# Patient Record
Sex: FEMALE | Race: WHITE
[De-identification: names, ages, dates, MRNs, and addresses within clinical notes are randomized per-mention and may not be internally consistent; named-entity substitution may affect disease eponyms.]

---

## 2018-09-25 ENCOUNTER — HOSPITAL ENCOUNTER (EMERGENCY)
Dept: HOSPITAL 97 - ER | Age: 73
Discharge: HOME | End: 2018-09-25
Payer: COMMERCIAL

## 2018-09-25 VITALS — TEMPERATURE: 97.2 F | OXYGEN SATURATION: 100 %

## 2018-09-25 VITALS — SYSTOLIC BLOOD PRESSURE: 154 MMHG | DIASTOLIC BLOOD PRESSURE: 62 MMHG

## 2018-09-25 DIAGNOSIS — Z88.2: ICD-10-CM

## 2018-09-25 DIAGNOSIS — I10: ICD-10-CM

## 2018-09-25 DIAGNOSIS — K64.9: ICD-10-CM

## 2018-09-25 DIAGNOSIS — Z95.1: ICD-10-CM

## 2018-09-25 DIAGNOSIS — K64.5: Primary | ICD-10-CM

## 2018-09-25 PROCEDURE — 99284 EMERGENCY DEPT VISIT MOD MDM: CPT

## 2018-09-25 NOTE — EDPHYS
Physician Documentation                                                                           

 [facility]                                                                

Name: Va Hughes                                                                              

Age: 73 yrs                                                                                       

Sex: Female                                                                                       

: 1945                                                                                   

MRN: [id number]                                                                                   

Arrival Date: 2018                                                                          

Time: 13:07                                                                                       

Account#: [id number]                                                                            

Bed 14                                                                                            

Private MD:                                                                                       

ED Physician Michael Martins                                                                       

HPI:                                                                                              

                                                                                             

13:32 This 73 yrs old  Female presents to ER via Ambulatory with complaints of       cp  

      Rectal Abscess.                                                                             

13:32 The patient presents to the emergency department with pain in the rectal area, that is  cp  

      moderate. Onset: The symptoms/episode began/occurred 2 day(s) ago.                          

13:32 Associate signs and symptoms: Pertinent negatives: abdominal pain, diarrhea, dysuria,   cp  

      fever, lower GI bleeding.                                                                   

                                                                                                  

Historical:                                                                                       

- Allergies:                                                                                      

13:24 Sulfa (Sulfonamide Antibiotics);                                                        hb  

- Home Meds:                                                                                      

13:24 Metoprolol Tartrate Oral [Active]; Omeprazole Oral [Active];                            hb  

- PMHx:                                                                                           

13:24 Hypertension;                                                                           hb  

- PSHx:                                                                                           

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  

                                                                                                  

- Immunization history:: Adult Immunizations up to date.                                          

- Social history:: Smoking status: Patient/guardian denies using tobacco.                         

- Ebola Screening: : No symptoms or risks identified at this time.                                

                                                                                                  

                                                                                                  

ROS:                                                                                              

13:35 Constitutional: Negative for body aches, chills, fever, poor PO intake.                 cp  

13:35 Eyes: Negative for injury, pain, redness, and discharge.                                cp  

13:35 Cardiovascular: Negative for chest pain.                                                    

13:35 Respiratory: Negative for cough, shortness of breath, wheezing.                             

13:35 Abdomen/GI: Positive for rectal pain, Negative for abdominal pain, vomiting, diarrhea,      

      black/tarry stool, rectal bleeding, bowel incontinence.                                     

13:35 : Negative for urinary symptoms.                                                          

13:35 All other systems are negative.                                                             

                                                                                                  

Exam:                                                                                             

13:42 Constitutional: The patient appears in no acute distress, alert, awake, well developed, cp  

      well nourished, uncomfortable.                                                              

13:42 Head/Face:  Normocephalic, atraumatic.                                                  cp  

13:42 Eyes: Periorbital structures: appear normal, Conjunctiva: normal, no exudate, no            

      injection, Sclera: no appreciated abnormality, Lids and lashes: appear normal,              

      bilaterally.                                                                                

13:42 ENT: External ear(s): are unremarkable, Nose: is normal, Mouth: Lips: moist, Oral           

      mucosa: moist, Posterior pharynx: is normal, airway is patent.                              

13:42 Chest/axilla: Inspection: normal.                                                           

13:42 Cardiovascular: Rate: bradycardic, Rhythm: regular.                                         

13:42 Respiratory: the patient does not display signs of respiratory distress,  Respirations:     

      normal, no use of accessory muscles, no retractions, no splinting, no tachypnea.            

13:42 Abdomen/GI: Inspection: abdomen appears normal, Palpation: abdomen is soft and              

      non-tender, in all quadrants, rebound tenderness, is not appreciated, voluntary             

      guarding, is not appreciated, involuntary guarding, is not appreciated.                     

13:42 Back: pain, is absent, ROM is normal.                                                       

13:42 : Rectal exam: hemorrhoid(s), external, painful, thrombosed.                              

13:42 Skin: abscess, not appreciated, cellulitis, is not appreciated, no rash present.            

                                                                                                  

Vital Signs:                                                                                      

13:23  / 60; Pulse 57; Resp 16; Temp 97.2; Pulse Ox 100% on R/A; Weight 63.05 kg;       hb  

      Height 5 ft. 2 in. (157.48 cm); Pain 10/10;                                                 

15:33  / 62; Pulse 60; Resp 18; Pulse Ox 100% on R/A;                                   hj  

13:23 Body Mass Index 25.42 (63.05 kg, 157.48 cm)                                             hb  

                                                                                                  

Procedures:                                                                                       

15:29 I \T\ D: Incision and drainage was performed for an abscess of the external hemorrhoid    cp

      Prepped with Betadine, Anesthetized with 3 ccs of 50/50 mixture 1% lidocaine with epi       

      and 0.5% marcaine. Incised with #11 blade. Drained multiple small clots.                    

                                                                                                  

MDM:                                                                                              

13:32 Patient medically screened.                                                             cp  

14:15 Differential diagnosis: hemorrhoids, fissure, abscess, pilonidal cyst.                  cp  

15:40 Data reviewed: vital signs, nurses notes, and as a result, I will discharge patient.    cp  

15:40 Counseling: I had a detailed discussion with the patient and/or guardian regarding: the cp  

      historical points, exam findings, and any diagnostic results supporting the                 

      discharge/admit diagnosis, to return to the emergency department if symptoms worsen or      

      persist or if there are any questions or concerns that arise at home. Response to           

      treatment: the patient's symptoms have markedly improved after treatment, and as a          

      result, I will discharge patient.                                                           

                                                                                                  

                                                                                             

14:06 Order name: I\T\D Setup; Complete Time: 15:11                                             cp

                                                                                                  

Administered Medications:                                                                         

15:11 Drug: Lidocaine-Epinephrine -1%: (1:100,000) 5 ml Volume: 20 ml; Route: Infiltration;   hj  

15:11 Drug: Marcaine (0.5 %) 5 ml Volume: 10 ml; Route: Infiltration;                         hj  

                                                                                                  

                                                                                                  

Disposition:                                                                                      

18 15:42 Discharged to Home. Impression: Hemorrhoids and perianal venous thrombosis.        

- Condition is Stable.                                                                            

- Discharge Instructions: Hemorrhoids, How to Take a Sitz Bath.                                   

- Prescriptions for Colace 100 mg Oral Tablet - take 1 tablet by ORAL route every 12              

  hours; 14 tablet. Tylenol- Codeine #3 300-30 mg Oral Tablet - take 2 tablets by ORAL            

  route every 6 hours As needed; 15 tablet.                                                       

- Medication Reconciliation Form, Thank You Letter, Antibiotic Education, Prescription            

  Opioid Use form.                                                                                

- Follow up: Hardik Paredes MD; When: 2 - 3 days; Reason: Recheck today's complaints.           

- Problem is new.                                                                                 

- Symptoms have improved.                                                                         

                                                                                                  

                                                                                                  

                                                                                                  

Addendum:                                                                                         

2018                                                                                        

     16:48 Co-signature as Attending Physician, Michael Martins MD.                                  g
s

                                                                                                  

Signatures:                                                                                       

Hardik Pedroza RN RN hj Page, Corey, PA PA   cp                                                   

Lizeth Romero RN RN hb Starr, Gregory, MD MD                                                      

                                                                                                  

Corrections: (The following items were deleted from the chart)                                    

                                                                                             

15:58 15:42 2018 15:42 Discharged to Home. Impression: Hemorrhoids and perianal venous  hj  

      thrombosis. Condition is Stable. Forms are Medication Reconciliation Form, Thank You        

      Letter, Antibiotic Education, Prescription Opioid Use. Follow up: Hardik Paredes; When:     

      2 - 3 days; Reason: Recheck today's complaints. Problem is new. Symptoms have improved.     

      cp                                                                                          

                                                                                                  

**************************************************************************************************

## 2018-09-25 NOTE — ER
Nurse's Notes                                                                                     

 [facility]                                                                

Name: Va Hughes                                                                              

Age: 73 yrs                                                                                       

Sex: Female                                                                                       

: 1945                                                                                   

MRN: [id number]                                                                                   

Arrival Date: 2018                                                                          

Time: 13:07                                                                                       

Account#: [id number]                                                                            

Bed 14                                                                                            

Private MD:                                                                                       

Diagnosis: Hemorrhoids and perianal venous thrombosis                                             

                                                                                                  

Presentation:                                                                                     

                                                                                             

13:22 Presenting complaint: Rectal abscess x 2 days. Transition of care: patient was not      hb  

      received from another setting of care. Onset of symptoms was 2018. Risk       

      Assessment: Do you want to hurt yourself or someone else? Patient reports no desire to      

      harm self or others. Care prior to arrival: None.                                           

13:22 Method Of Arrival: Ambulatory                                                           hb  

13:22 Acuity: EDMOND 4                                                                           hj  

13:33 Initial Sepsis Screen: Does the patient meet any 2 criteria? No. Patient's initial      hj  

      sepsis screen is negative. Does the patient have a suspected source of infection? No.       

      Patient's initial sepsis screen is negative.                                                

                                                                                                  

Triage Assessment:                                                                                

13:33 General: Appears in no apparent distress. uncomfortable, Behavior is calm, cooperative, hj  

      appropriate for age. Pain: Complains of pain in rectum.                                     

                                                                                                  

Historical:                                                                                       

- Allergies:                                                                                      

13:24 Sulfa (Sulfonamide Antibiotics);                                                        hb  

- Home Meds:                                                                                      

13:24 Metoprolol Tartrate Oral [Active]; Omeprazole Oral [Active];                            hb  

- PMHx:                                                                                           

13:24 Hypertension;                                                                           hb  

- PSHx:                                                                                           

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  

                                                                                                  

- Immunization history:: Adult Immunizations up to date.                                          

- Social history:: Smoking status: Patient/guardian denies using tobacco.                         

- Ebola Screening: : No symptoms or risks identified at this time.                                

                                                                                                  

                                                                                                  

Screenin:32 Abuse screen: Denies threats or abuse. Denies injuries from another. Nutritional        hj  

      screening: No deficits noted. Tuberculosis screening: No symptoms or risk factors           

      identified. Fall Risk None identified.                                                      

                                                                                                  

Assessment:                                                                                       

13:30 Reassessment: see triage for assessment;.                                               hj  

14:15 Reassessment: Patient and/or family updated on plan of care and expected duration. Pain hj  

      level reassessed. Patient is alert, oriented x 3, equal unlabored respirations, skin        

      warm/dry/pink. I\T\D kit in room;.                                                          

15:33 Reassessment: Patient and/or family updated on plan of care and expected duration. Pain hj  

      level reassessed. Patient is alert, oriented x 3, equal unlabored respirations, skin        

      warm/dry/pink. provider in room for I\T\D with ED tech Lissa;.                            

                                                                                                  

Vital Signs:                                                                                      

13:23  / 60; Pulse 57; Resp 16; Temp 97.2; Pulse Ox 100% on R/A; Weight 63.05 kg;       hb  

      Height 5 ft. 2 in. (157.48 cm); Pain 10/10;                                                 

15:33  / 62; Pulse 60; Resp 18; Pulse Ox 100% on R/A;                                   hj  

13:23 Body Mass Index 25.42 (63.05 kg, 157.48 cm)                                             hb  

                                                                                                  

ED Course:                                                                                        

13:07 Patient arrived in ED.                                                                  [id number] 

13:23 Triage completed.                                                                       hb  

13:23 Arm band placed on right wrist.                                                         hb  

13:25 Quan Marino PA is PHCP.                                                                cp  

13:25 Michael Martins MD is Attending Physician.                                              cp  

13:30 Served as a chaperone during rectal exam.                                               [facility] 

13:32 Hardik Pedroza RN is Primary Nurse.                                                    hj  

13:33 Patient has correct armband on for positive identification. Bed in low position. Call   hj  

      light in reach. Side rails up X 1.                                                          

14:07 Served as a chaperone during rectal exam.                                               [facility] 

15:30 Assist provider with I \T\ D: Set up I\T\D tray. Patient tolerated well. CLEANED WOUND      
5

      PATIENT TOLERATED IT WELL. Wound care: Patient tolerated well.                              

15:37 Hardik Paredes MD is Referral Physician.                                                

15:58 Patient did not have IV access during this emergency room visit.                        hj  

                                                                                                  

Administered Medications:                                                                         

15:11 Drug: Lidocaine-Epinephrine -1%: (1:100,000) 5 ml Volume: 20 ml; Route: Infiltration;   hj  

15:11 Drug: Marcaine (0.5 %) 5 ml Volume: 10 ml; Route: Infiltration;                           

                                                                                                  

                                                                                                  

Outcome:                                                                                          

15:42 Discharge ordered by MD.                                                                cp  

15:57 Discharged to home ambulatory.                                                            

15:57 Condition: stable                                                                           

15:57 Discharge instructions given to patient, Instructed on discharge instructions, follow       

      up and referral plans. medication usage, wound care, Demonstrated understanding of          

      instructions, follow-up care, medications, wound care, Prescriptions given X 2.             

15:58 Patient left the ED.                                                                    hj  

                                                                                                  

Signatures:                                                                                       

Hardik Pedroza RN RN hj Page, Corey, PA PA cp Baxter, Lizeth, GRACY                     RN   Martha Whiting                                 4                                                  

Erin Paredes                              5                                                  

                                                                                                  

Corrections: (The following items were deleted from the chart)                                    

13:33 13:22 Acuity: EDMOND 3 hb                                                                  hj  

                                                                                                  

**************************************************************************************************

## 2019-07-21 ENCOUNTER — HOSPITAL ENCOUNTER (EMERGENCY)
Dept: HOSPITAL 97 - ER | Age: 74
Discharge: HOME | End: 2019-07-21
Payer: COMMERCIAL

## 2019-07-21 VITALS — SYSTOLIC BLOOD PRESSURE: 144 MMHG | OXYGEN SATURATION: 99 % | DIASTOLIC BLOOD PRESSURE: 79 MMHG

## 2019-07-21 VITALS — TEMPERATURE: 98.4 F

## 2019-07-21 DIAGNOSIS — I10: ICD-10-CM

## 2019-07-21 DIAGNOSIS — Z88.2: ICD-10-CM

## 2019-07-21 DIAGNOSIS — S61.210A: Primary | ICD-10-CM

## 2019-07-21 DIAGNOSIS — E11.9: ICD-10-CM

## 2019-07-21 PROCEDURE — 99284 EMERGENCY DEPT VISIT MOD MDM: CPT

## 2019-07-21 PROCEDURE — 0JQJ0ZZ REPAIR RIGHT HAND SUBCUTANEOUS TISSUE AND FASCIA, OPEN APPROACH: ICD-10-PCS

## 2019-07-21 NOTE — RAD REPORT
EXAM DESCRIPTION:  RAD - Hand Right 3 View - 7/21/2019 3:08 pm

 

CLINICAL HISTORY:  PAIN

 

COMPARISON:  <Comparisons>

 

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 Sof
t tissue swelling is seen affecting the second finger. Small laceration is present in the region. No 
fracture or foreign body appreciated.

## 2019-07-21 NOTE — EDPHYS
Physician Documentation                                                                           

 [facility]                                                                 

Name: Va Hughes                                                                              

Age: 73 yrs                                                                                       

Sex: Female                                                                                       

: 1945                                                                                   

MRN: [id number]                                                                                   

Arrival Date: 2019                                                                          

Time: 14:37                                                                                       

Account#: [id number]                                                                            

Bed 25                                                                                            

Private MD:                                                                                       

ED Physician Michael Martins                                                                       

HPI:                                                                                              

                                                                                             

15:31 This 73 yrs old  Female presents to ER via Ambulatory with complaints of       kb  

      Finger Injury.                                                                              

15:33 The patient has a laceration related to: closed finger in door. The laceration(s)       kb  

      is(are) located on the palmar aspect of distal phalanx of right index finger. Onset:        

      The symptoms/episode began/occurred 1 hour(s) ago. Associated signs and symptoms: The       

      patient has no apparent associated signs or symptoms. The patient has not experienced       

      similar symptoms in the past. The patient has not recently seen a physician.                

                                                                                                  

Historical:                                                                                       

- Allergies:                                                                                      

14:46 Sulfa (Sulfonamide Antibiotics);                                                        la1 

- PMHx:                                                                                           

14:46 Hypertension; Diabetes - NIDDM;                                                         la1 

                                                                                                  

- Immunization history:: Adult Immunizations up to date.                                          

- Social history:: Smoking status: Patient/guardian denies using tobacco.                         

- Ebola Screening: : No symptoms or risks identified at this time.                                

                                                                                                  

                                                                                                  

ROS:                                                                                              

15:29 Constitutional: Negative for fever, chills, and weight loss, Cardiovascular: Negative   kb  

      for chest pain, palpitations, and edema, Respiratory: Negative for shortness of breath,     

      cough, wheezing, and pleuritic chest pain, Abdomen/GI: Negative for abdominal pain,         

      nausea, vomiting, diarrhea, and constipation, MS/Extremity: Negative for injury and         

      deformity, Neuro: Negative for headache, weakness, numbness, tingling, and seizure.         

15:29 Skin: Positive for laceration(s), of the palmar aspect of distal phalanx of right index     

      finger.                                                                                     

                                                                                                  

Exam:                                                                                             

15:29 Constitutional:  This is a well developed, well nourished patient who is awake, alert,  kb  

      and in no acute distress. Head/Face:  Normocephalic, atraumatic. Chest/axilla:  Normal      

      chest wall appearance and motion.  Nontender with no deformity.  No lesions are             

      appreciated. Cardiovascular:  Regular rate and rhythm with a normal S1 and S2.  No          

      gallops, murmurs, or rubs.  Normal PMI, no JVD.  No pulse deficits. Respiratory:  Lungs     

      have equal breath sounds bilaterally, clear to auscultation and percussion.  No rales,      

      rhonchi or wheezes noted.  No increased work of breathing, no retractions or nasal          

      flaring. Abdomen/GI:  Soft, non-tender, with normal bowel sounds.  No distension or         

      tympany.  No guarding or rebound.  No evidence of tenderness throughout. MS/ Extremity:     

       Pulses equal, no cyanosis.  Neurovascular intact.  Full, normal range of motion.           

      Neuro:  Awake and alert, GCS 15, oriented to person, place, time, and situation.            

      Cranial nerves II-XII grossly intact.  Motor strength 5/5 in all extremities.  Sensory      

      grossly intact.  Cerebellar exam normal.  Normal gait.                                      

15:29 Skin: injury, laceration(s), the wound is approximately  2 cm(s), of the palmar aspect      

      of distal phalanx of right index finger, that can be described as clean, no foreign         

      body, linear, without bleeding.                                                             

                                                                                                  

Vital Signs:                                                                                      

14:47  / 89; Pulse 55; Resp 16; Temp 98.4; Pulse Ox 98% on R/A; Weight 60.78 kg; Height la1 

      5 ft. 1 in. (154.94 cm);                                                                    

15:46  / 79; Pulse 61; Resp 17; Pulse Ox 99% on R/A;                                    ca1 

14:47 Body Mass Index 25.32 (60.78 kg, 154.94 cm)                                             la1 

                                                                                                  

Laceration:                                                                                       

15:29 Wound Repair of 2cm ( 0.8in ) subcutaneous laceration to palmar aspect of distal        kb  

      phalanx of right index finger. Linear shaped.. Distal neuro/vascular/tendon intact.         

      Anesthesia: Wound infiltrated with 1 mls of 1% lidocaine. Wound prep: Extensive             

      cleansing with hibiclenz by technician, Wound irrigation with saline by technician.         

      Skin closed with 3 4-0 Prolene using interrupted sutures and sterile technique. Dressed     

      with Neosporin. Patient tolerated well.                                                     

                                                                                                  

MDM:                                                                                              

14:49 Patient medically screened.                                                             kb  

15:29 Data reviewed: vital signs, nurses notes. Data interpreted: Pulse oximetry: on room air kb  

      is 98 %. Interpretation: normal. Counseling: I had a detailed discussion with the           

      patient and/or guardian regarding: the historical points, exam findings, and any            

      diagnostic results supporting the discharge/admit diagnosis, radiology results, the         

      need for outpatient follow up, a family practitioner, to return to the emergency            

      department if symptoms worsen or persist or if there are any questions or concerns that     

      arise at home.                                                                              

                                                                                                  

                                                                                             

14:52 Order name: Hand Right 3 View XRAY; Complete Time: 15:31                                kb  

                                                                                             

14:52 Order name: Wound Care: clean wound; Complete Time: 14:56                               kb  

                                                                                                  

Administered Medications:                                                                         

No medications were administered                                                                  

                                                                                                  

                                                                                                  

Disposition:                                                                                      

19 15:34 Discharged to Home. Impression: Laceration without foreign body of right index     

  finger without damage to nail.                                                                  

- Condition is Stable.                                                                            

- Discharge Instructions: Laceration Care, Adult, Easy-to-Read.                                   

                                                                                                  

- Medication Reconciliation Form, Thank You Letter, Antibiotic Education, Prescription            

  Opioid Use form.                                                                                

- Follow up: Emergency Department; When: As needed; Reason: Worsening of condition.               

  Follow up: Private Physician; When: 2 - 3 days; Reason: Recheck today's complaints,             

  Continuance of care, Re-evaluation by your physician.                                           

- Notes: Have sutures removed in 10 days Keep clean and dry                                       

                                                                                                  

                                                                                                  

Signatures:                                                                                       

Dispatcher [employer]                           EDMS                                                 

Maia Suarez, FNP-C                 FNP-Ckb                                                   

Martinez Dial RN                         RN   la1                                                  

Roselia Kuhn RN                        RN   ca1                                                  

                                                                                                  

Corrections: (The following items were deleted from the chart)                                    

15:49 15:34 2019 15:34 Discharged to Home. Impression: Laceration without foreign body  ca1 

      of right index finger without damage to nail. Condition is Stable. Forms are Medication     

      Reconciliation Form, Thank You Letter, Antibiotic Education, Prescription Opioid Use.       

      Follow up: Emergency Department; When: As needed; Reason: Worsening of condition.           

      Follow up: Private Physician; When: 2 - 3 days; Reason: Recheck today's complaints,         

      Continuance of care, Re-evaluation by your physician. kb                                    

                                                                                                  

**************************************************************************************************

## 2019-07-21 NOTE — ER
Nurse's Notes                                                                                     

 [facility]                                                                 

Name: Va Hughes                                                                              

Age: 73 yrs                                                                                       

Sex: Female                                                                                       

: 1945                                                                                   

MRN: [id number]                                                                                   

Arrival Date: 2019                                                                          

Time: 14:37                                                                                       

Account#: [id number]                                                                            

Bed 25                                                                                            

Private MD:                                                                                       

Diagnosis: Laceration without foreign body of right index finger without damage to nail           

                                                                                                  

Presentation:                                                                                     

                                                                                             

14:48 Presenting complaint: Patient states: I got my right index finger in the car door.      la1 

      Small laceration noted to finger. Transition of care: patient was not received from         

      another setting of care. Onset of symptoms was 2019. Risk Assessment: Do you       

      want to hurt yourself or someone else? Patient reports no desire to harm self or            

      others. Initial Sepsis Screen: Does the patient meet any 2 criteria? No. Patient's          

      initial sepsis screen is negative. Does the patient have a suspected source of              

      infection? No. Patient's initial sepsis screen is negative. Care prior to arrival: None.    

14:48 Method Of Arrival: Ambulatory                                                           la1 

14:48 Acuity: EDMOND 4                                                                           la1 

                                                                                                  

Triage Assessment:                                                                                

15:00 General: Appears in no apparent distress. comfortable, Behavior is calm, cooperative,   ca1 

      appropriate for age. Injury Description: Abrasion sustained to palmar aspect of middle      

      phalanx of right index finger is dirty, was sustained less than 30 minutes ago.             

                                                                                                  

Historical:                                                                                       

- Allergies:                                                                                      

14:46 Sulfa (Sulfonamide Antibiotics);                                                        la1 

- PMHx:                                                                                           

14:46 Hypertension; Diabetes - NIDDM;                                                         la1 

                                                                                                  

- Immunization history:: Adult Immunizations up to date.                                          

- Social history:: Smoking status: Patient/guardian denies using tobacco.                         

- Ebola Screening: : No symptoms or risks identified at this time.                                

                                                                                                  

                                                                                                  

Screenin:57 Abuse screen: Denies threats or abuse. Denies injuries from another. Nutritional        ca1 

      screening: No deficits noted. Tuberculosis screening: No symptoms or risk factors           

      identified. Fall Risk None identified.                                                      

                                                                                                  

Assessment:                                                                                       

14:57 General: Appears in no apparent distress. comfortable, Behavior is calm, cooperative,   ca1 

      appropriate for age. Pain: Complains of pain in right hand Pain does not radiate. Pain      

      currently is 7 out of 10 on a pain scale. Quality of pain is described as throbbing,        

      Pain began 30 min ago. Neuro: Level of Consciousness is awake, alert, obeys commands,       

      Oriented to person, place, time, situation. Cardiovascular: Heart tones S1 S2 present       

      Capillary refill < 3 seconds Patient's skin is warm and dry. Respiratory: Airway is         

      patent Respiratory effort is even, unlabored, Respiratory pattern is regular,               

      symmetrical, Breath sounds are clear bilaterally. Derm: Skin is healthy with good           

      turgor, Skin is pink, warm \T\ dry. Musculoskeletal: Circulation, motion, and sensation     

      intact. Capillary refill < 3 seconds, Range of motion: intact in all extremities.           

15:46 Reassessment: Patient appears in no apparent distress at this time. Patient is alert,   ca1 

      oriented x 3, equal unlabored respirations, skin warm/dry/pink.                             

                                                                                                  

Vital Signs:                                                                                      

14:47  / 89; Pulse 55; Resp 16; Temp 98.4; Pulse Ox 98% on R/A; Weight 60.78 kg; Height la1 

      5 ft. 1 in. (154.94 cm);                                                                    

15:46  / 79; Pulse 61; Resp 17; Pulse Ox 99% on R/A;                                    ca1 

14:47 Body Mass Index 25.32 (60.78 kg, 154.94 cm)                                             la1 

                                                                                                  

ED Course:                                                                                        

14:37 Patient arrived in ED.                                                                  mr  

14:47 Arm band placed on right wrist.                                                         la1 

14:48 Triage completed.                                                                       la1 

14:49 Maia Suarez FNP-C is [facility].                                                        kb  

14:49 Michael Martins MD is Attending Physician.                                              kb  

14:51 Roselia Kuhn RN is Primary Nurse.                                                      ca1 

14:57 Patient has correct armband on for positive identification. Bed in low position. Call   ca1 

      light in reach. Side rails up X 1. Pulse ox on. NIBP on.                                    

14:57 Patient did not have IV access during this emergency room visit.                        ca1 

15:03 Wound care: to laceration located on palmar aspect of distal phalanx of right index     jp3 

      finger was cleaned with Hibiclens, debrided using Betadine scrub, irrigated with normal     

      saline, Patient tolerated well.                                                             

15:08 Hand Right 3 View XRAY In Process Unspecified.                                          EDMS

15:30 Assist provider with laceration repair on palmar aspect of distal phalanx of right      ca1 

      index finger that was 2.5 cm. or less using sutures. Set up tray. Performed by Maia ONOFRE Dressed with 4X4s, Patient tolerated well.                                    

15:40 Aluminum finger splint applied to palmar aspect of distal phalanx of right index finger jp3 

      and palmar aspect of middle phalanx of right index finger. Patient maintains SpO2           

      saturation greater than 95% on room air.                                                    

                                                                                                  

Administered Medications:                                                                         

No medications were administered                                                                  

                                                                                                  

                                                                                                  

Outcome:                                                                                          

15:34 Discharge ordered by MD.                                                                aung  

15:49 Discharged to home ambulatory, with family.                                             ca1 

15:49 Condition: stable                                                                           

15:49 Discharge instructions given to patient, Instructed on discharge instructions, follow       

      up and referral plans. wound care, Demonstrated understanding of instructions,              

      follow-up care, wound care.                                                                 

15:49 Patient left the ED.                                                                    ca1 

                                                                                                  

Signatures:                                                                                       

Dispatcher [employer]                           EDMS                                                 

Maia Suarez, FNP-C                 FNP-Helga Butts Lee, RN                         RN   la1                                                  

Dorian Auguste                              jp3                                                  

Roselia Kuhn RN                        RN   ca1                                                  

                                                                                                  

**************************************************************************************************

## 2020-08-30 ENCOUNTER — HOSPITAL ENCOUNTER (EMERGENCY)
Dept: HOSPITAL 97 - ER | Age: 75
LOS: 1 days | Discharge: HOME | End: 2020-08-31
Payer: SELF-PAY

## 2020-08-30 DIAGNOSIS — I10: ICD-10-CM

## 2020-08-30 DIAGNOSIS — N88.8: ICD-10-CM

## 2020-08-30 DIAGNOSIS — Z88.2: ICD-10-CM

## 2020-08-30 DIAGNOSIS — N39.0: Primary | ICD-10-CM

## 2020-08-30 LAB
ALBUMIN SERPL BCP-MCNC: 3.1 G/DL (ref 3.4–5)
ALP SERPL-CCNC: 48 U/L (ref 45–117)
ALT SERPL W P-5'-P-CCNC: 16 U/L (ref 12–78)
AST SERPL W P-5'-P-CCNC: 23 U/L (ref 15–37)
BUN BLD-MCNC: 13 MG/DL (ref 7–18)
GLUCOSE SERPLBLD-MCNC: 159 MG/DL (ref 74–106)
HCT VFR BLD CALC: 32.3 % (ref 36–45)
LIPASE SERPL-CCNC: 83 U/L (ref 73–393)
LYMPHOCYTES # SPEC AUTO: 2 K/UL (ref 0.7–4.9)
MAGNESIUM SERPL-MCNC: 2.3 MG/DL (ref 1.8–2.4)
PMV BLD: 8.5 FL (ref 7.6–11.3)
POTASSIUM SERPL-SCNC: 3.3 MMOL/L (ref 3.5–5.1)
RBC # BLD: 3.66 M/UL (ref 3.86–4.86)

## 2020-08-30 PROCEDURE — 74177 CT ABD & PELVIS W/CONTRAST: CPT

## 2020-08-30 PROCEDURE — 84484 ASSAY OF TROPONIN QUANT: CPT

## 2020-08-30 PROCEDURE — 36415 COLL VENOUS BLD VENIPUNCTURE: CPT

## 2020-08-30 PROCEDURE — 93005 ELECTROCARDIOGRAM TRACING: CPT

## 2020-08-30 PROCEDURE — 96361 HYDRATE IV INFUSION ADD-ON: CPT

## 2020-08-30 PROCEDURE — 96374 THER/PROPH/DIAG INJ IV PUSH: CPT

## 2020-08-30 PROCEDURE — 80048 BASIC METABOLIC PNL TOTAL CA: CPT

## 2020-08-30 PROCEDURE — 99284 EMERGENCY DEPT VISIT MOD MDM: CPT

## 2020-08-30 PROCEDURE — 83690 ASSAY OF LIPASE: CPT

## 2020-08-30 PROCEDURE — 80076 HEPATIC FUNCTION PANEL: CPT

## 2020-08-30 PROCEDURE — 83735 ASSAY OF MAGNESIUM: CPT

## 2020-08-30 PROCEDURE — 81003 URINALYSIS AUTO W/O SCOPE: CPT

## 2020-08-30 PROCEDURE — 85025 COMPLETE CBC W/AUTO DIFF WBC: CPT

## 2020-08-31 NOTE — ER
Nurse's Notes                                                                                     

 [facility]                                                                 

Name: Va Hughes                                                                              

Age: 75 yrs                                                                                       

Sex: Female                                                                                       

: 1945                                                                                   

MRN: [id number]                                                                                   

Arrival Date: 2020                                                                          

Time: 17:49                                                                                       

Account#: [id number]                                                                            

Bed 6                                                                                             

Private MD:                                                                                       

Diagnosis: Abdominal Pain;Cervical Mass;Urinary tract infection, site not specified               

                                                                                                  

Presentation:                                                                                     

                                                                                             

17:54 Chief complaint: Patient states: has lost about 50 pounds in the last 2-3 months, she   sv  

      thinks its d/t stress. Burning w/ urination, painful urination x several months. "I         

      also have 5 growths in my thyroid. And I have lost more hair. I'm also anemic. This         

      heat has also been getting to me.". Risk Assessment: Do you want to hurt yourself or        

      someone else? Patient reports no desire to harm self or others. Onset of symptoms was       

      .                                                                                       

17:54 Method Of Arrival: Ambulatory                                                           sv  

17:54 Acuity: EDMOND 3                                                                           sv  

17:58 Coronavirus screen: Client denies travel out of the [place] in the last 14 days. At this   sv  

      time, the client does not indicate any symptoms associated with coronavirus-19. Ebola       

      Screen: No symptoms or risks identified at this time. Initial Sepsis Screen: Does the       

      patient meet any 2 criteria? No. Patient's initial sepsis screen is negative. Does the      

      patient have a suspected source of infection? No. Patient's initial sepsis screen is        

      negative.                                                                                   

                                                                                                  

Historical:                                                                                       

- Allergies:                                                                                      

17:57 Sulfa (Sulfonamide Antibiotics);                                                        sv  

- PMHx:                                                                                           

17:57 Diabetes - NIDDM; Hypertension;                                                         sv  

                                                                                                  

- Immunization history:: Adult Immunizations up to date.                                          

- Social history:: Smoking status: Patient denies any tobacco usage or history of.                

                                                                                                  

                                                                                                  

Screenin:45 Abuse screen: Denies threats or abuse. Nutritional screening: No deficits noted.        mt2 

      Tuberculosis screening: No symptoms or risk factors identified. Fall Risk None              

      identified.                                                                                 

                                                                                                  

Assessment:                                                                                       

19:44 Reassessment: Patient and/or family updated on plan of care and expected duration. Pain mt2 

      level reassessed. Patient is alert, oriented x 3, equal unlabored respirations, skin        

      warm/dry/pink. General: Appears in no apparent distress. Behavior is cooperative. Pain:     

      Denies pain.                                                                                

19:45 Neuro: No deficits noted. Cardiovascular: No deficits noted. Respiratory: No deficits   mt2 

      noted. GI: Reports lower abdominal pain, nausea. : No deficits noted. EENT: No            

      deficits noted. Derm: No deficits noted. Musculoskeletal: No deficits noted.                

20:30 Reassessment: Patient and/or family updated on plan of care and expected duration. Pain mt2 

      level reassessed. Patient is alert, oriented x 3, equal unlabored respirations, skin        

      warm/dry/pink. General: Appears uncomfortable, Behavior is agitated. Pain: Denies pain.     

22:00 Reassessment: Patient and/or family updated on plan of care and expected duration. Pain mt2 

      level reassessed. Patient is alert, oriented x 3, equal unlabored respirations, skin        

      warm/dry/pink. General: Appears distressed, uncomfortable, Behavior is anxious. Pain:       

      Complains of pain in chest Pain currently is 10 out of 10 on a pain scale.                  

      Cardiovascular: Reports chest pain.                                                         

22:00 Reassessment: PATIENT RETURN FROM CT AND C/O CP. MD NOTIFIED.                           mt2 

23:08 Reassessment: Patient and/or family updated on plan of care and expected duration. Pain mt2 

      level reassessed. Patient is alert, oriented x 3, equal unlabored respirations, skin        

      warm/dry/pink. Patient denies pain at this time. General: Appears comfortable, Behavior     

      is cooperative. Pain: Denies pain.                                                          

                                                                                                  

Vital Signs:                                                                                      

17:58  / 61; Pulse 89; Resp 16; Temp 98.5(O); Pulse Ox 98% ; Weight 51.26 kg; Height 5  sv  

      ft. 2 in. (157.48 cm);                                                                      

20:00  / 79; Pulse 77; Resp 16; Pulse Ox 95% ; Pain 5/10;                               mt2 

21:00  / 79; Pulse 81; Resp 16; Pulse Ox 95% ; Pain 0/10;                               mt2 

22:00  / 110; Pulse 80; Resp 22; Pulse Ox 96% ; Pain 10/10;                             mt2 

23:09  / 81; Pulse 71; Resp 16; Pulse Ox 96% ; Pain 0/10;                               mt2 

23:20  / 56; Pulse 73; Resp 16; Pulse Ox 96% ; Pain 0/10;                               mt2 

17:58 Body Mass Index 20.67 (51.26 kg, 157.48 cm)                                               

                                                                                                  

ED Course:                                                                                        

17:49 Patient arrived in ED.                                                                  mr  

17:54 Arm band placed on.                                                                       

17:57 Triage completed.                                                                         

18:17 Ele Garcia, RN is Primary Nurse.                                                     ll1 

19:04 Allan Johnson MD is Attending Physician.                                             [facility] 

19:35 Inserted saline lock: 22 gauge in right forearm, using aseptic technique. Blood         ds4 

      collected.                                                                                  

19:45 Patient has correct armband on for positive identification. Bed in low position. Call   mt2 

      light in reach. Side rails up X 1.                                                          

22:05 CT Abd/Pelvis - IV Contrast Only In Process Unspecified.                                EDMS

                                                                                             

00:02 Shayy Vizcaino MD is Referral Physician.                                            [facility] 

                                                                                                  

Administered Medications:                                                                         

                                                                                             

19:44 Drug: NS 0.9% 1000 ml Route: IV; Rate: 1000 ml; Site: right forearm;                    mt2 

20:30 Follow up: Response: No adverse reaction; IV Status: Completed infusion                 mt2 

19:44 Drug: Pepcid 20 mg Route: IVP; Site: right forearm;                                     mt2 

20:30 Follow up: Response: No adverse reaction; Pain is decreased                             mt2 

                                                                                                  

                                                                                                  

Outcome:                                                                                          

                                                                                             

00:03 Discharge ordered by MD.                                                                [facility] 

00:45 Patient left the ED.                                                                    mt2 

                                                                                                  

Signatures:                                                                                       

Dispatcher [employer]                           EDMS                                                 

Lynn Condon RN                    RN   Helga Mckeon Donovan                             ds4                                                  

Ele Garcia, GRACY                       RN   ll1                                                  

Allan Johnson MD MD   7                                                  

Emani Vance RN                    RN   mt2                                                  

                                                                                                  

Corrections: (The following items were deleted from the chart)                                    

                                                                                             

18:00 17:54 Chief complaint: Patient states: has lost about 50 pounds in the last 2-3 months, sv  

      she thinks its d/t stress. Burning w/ urination, painful urination x several months. "I     

      also have 5 growths in my thyroid. And I have lost more hair. I'm also anemic."           

18:01 17:58 Pulse 89bpm; Resp 16bpm; Pulse Ox 98%; Temp 98.5F Oral; 51.26 kg; Height 5 ft. 2  sv  

      in.; BMI: 20.6; sv                                                                          

23:08 23:05 Reassessment: Patient and/or family updated on plan of care and expected          mt2 

      duration. Pain level reassessed. Patient is alert, oriented x 3, equal unlabored            

      respirations, skin warm/dry/pink. mt2                                                       

23: 23:05 General: Appears distressed, uncomfortable, Behavior is anxious, mt2              mt2 

23: 23:05 Pain: Complains of pain in chest Pain currently is 10 out of 10 on a pain scale.  mt2 

      mt2                                                                                         

23:08 23:05 Cardiovascular: Reports chest pain, mt2                                           mt2 

                                                                                                  

**************************************************************************************************

## 2020-08-31 NOTE — RAD REPORT
EXAM DESCRIPTION:  CT - Abdomen   Pelvis W Contrast - 8/30/2020 11:35 pm

 

CLINICAL HISTORY:  ABD PAIN

 

TECHNIQUE:  Contiguous axial images obtained through the abdomen and pelvis following the uneventful 
administration of IV contrast. Coronal and sagittal reformatted images were provided.

This exam was performed according to our departmental dose-optimization program, which includes autom
ated exposure control, adjustment of the mA and/or kV according to patient size and/or use of iterati
ve reconstruction technique.

 

COMPARISON:  8/16/2017

 

FINDINGS:  Lung bases: No focal consolidation. The heart is enlarged. Coronary artery calcification.

Liver: Unremarkable

Gallbladder and biliary system: Unremarkable

Pancreas: Unremarkable

Spleen: Unremarkable

Adrenals: Stable high density 2.5 cm right adrenal nodule.

Kidneys: Normal renal cortical enhancement. Multiple bilateral cysts, the largest at the upper pole o
n the right measuring (previously 1.5 cm). Large staghorn calculus again demonstrated on the right. S
mall left renal calculi. Minimal urothelial thickening on the right. No hydronephrosis.

Bowel: No obstruction. No appreciable mucosal thickening.

Appendix: Normal caliber appendix. No findings to suggest acute appendicitis.

Urinary bladder: Distended.

Reproductive: Large irregular heterogeneous mass at the level of the cervix measuring 5.5 x 6 x 6.2 c
m. Loss of the fat plane between this mass and the posterior aspect of the urinary bladder.

Lymph nodes: No pathologically enlarged lymph nodes.

Peritoneum: No focal fluid collection. No free air.

Vessels: Moderate to severe atherosclerotic disease. No abdominal aortic aneurysm.

Abdominal wall: Tiny fat-containing umbilical hernia.

Bones: Multilevel spondylosis. No acute fracture.

 

IMPRESSION:  1.   Large irregular heterogeneous mass at the level of the cervix concerning for malign
miguel. There is loss of the fat plane between this mass and the posterior aspect of the urinary bladde
r.

2.   Mild urothelial thickening on the right. Please correlate clinically for urinary tract infection
.

3.   Stable 2.5 cm right adrenal mass. Probably benign. No further follow-up imaging is recommended. 
Reference: JACR 2017 Aug;14(8):1038-44, JCAT 2016 Mar-Apr;40(2):194-200

4.   Other findings as above.

 

Electronically signed by:   Olivia Calderon MD   8/30/2020 10:44 PM CDT Workstation: [id number]

 

 

Due to temporary technical issues with the PACS/Fluency reporting system, reports are being signed by
 the in house radiologist without review as a courtesy to ensure prompt reporting. The interpreting r
adiologist is fully responsible for the content of the report.

## 2020-08-31 NOTE — EDPHYS
Physician Documentation                                                                           

 [facility]                                                                 

Name: Va Hughes                                                                              

Age: 75 yrs                                                                                       

Sex: Female                                                                                       

: 1945                                                                                   

MRN: [id number]                                                                                   

Arrival Date: 2020                                                                          

Time: 17:49                                                                                       

Account#: [id number]                                                                            

Bed 6                                                                                             

Private MD:                                                                                       

ED Physician Allan Johnson                                                                      

HPI:                                                                                              

                                                                                             

19:39 This 75 yrs old  Female presents to ER via Ambulatory with complaints of       mh7 

      Dehydration.                                                                                

19:39 Dehydration over the past 2 weeks. Onset: The symptoms/episode began/occurred 2 week(s) mh7 

      ago. Severity of symptoms: At their worst the symptoms were moderate 5 day(s) ago, in       

      the emergency department the symptoms have improved moderately. The patient has been        

      recently seen by a physician: the patient's primary care provider. Patient states that      

      she has had generalized fatigue and weight loss over the past 3 months. She reports         

      feeling dehydrated over the past 2 weeks due to decreased appetite. She has had some        

      intermittent upper abdominal pain over the past week. She has had intermittent dysuria      

      for the past 2-3 weeks. She denies any headache, chest pain, fever, nausea, vomiting,       

      diarrhea, dizziness, cough, SOB, numbness/tingling, or focal weakness..                     

                                                                                                  

Historical:                                                                                       

- Allergies:                                                                                      

17:57 Sulfa (Sulfonamide Antibiotics);                                                        sv  

- PMHx:                                                                                           

17:57 Diabetes - NIDDM; Hypertension;                                                         sv  

                                                                                                  

- Immunization history:: Adult Immunizations up to date.                                          

- Social history:: Smoking status: Patient denies any tobacco usage or history of.                

                                                                                                  

                                                                                                  

ROS:                                                                                              

19:39 Eyes: Negative for injury, pain, redness, and discharge, ENT: Negative for injury,      mh7 

      pain, and discharge, Neck: Negative for injury, pain, and swelling, Cardiovascular:         

      Negative for chest pain, palpitations, and edema, Respiratory: Negative for shortness       

      of breath, cough, wheezing, and pleuritic chest pain, Back: Negative for injury and         

      pain, MS/Extremity: Negative for injury and deformity, Skin: Negative for injury, rash,     

      and discoloration, Psych: Negative for depression, anxiety, suicide ideation, homicidal     

      ideation, and hallucinations, Allergy/Immunology: Negative for hives, rash, and             

      allergies, Hematologic/Lymphatic: Negative for swollen nodes, abnormal bleeding, and        

      unusual bruising.                                                                           

                                                                                                  

Exam:                                                                                             

19:39 Head/Face:  Normocephalic, atraumatic. Eyes:  Pupils equal round and reactive to light, mh7 

      extra-ocular motions intact.  Lids and lashes normal.  Conjunctiva and sclera are           

      non-icteric and not injected.  Cornea within normal limits.  Periorbital areas with no      

      swelling, redness, or edema. ENT:  Nares patent. No nasal discharge, no septal              

      abnormalities noted.  Tympanic membranes are normal and external auditory canals are        

      clear.  Oropharynx with no redness, swelling, or masses, exudates, or evidence of           

      obstruction, uvula midline.  Mucous membranes moist. Neck:  Trachea midline, no             

      thyromegaly or masses palpated, and no cervical lymphadenopathy.  Supple, full range of     

      motion without nuchal rigidity, or vertebral point tenderness.  No Meningismus.             

      Chest/axilla:  Normal chest wall appearance and motion.  Nontender with no deformity.       

      No lesions are appreciated. Cardiovascular:  Regular rate and rhythm with a normal S1       

      and S2.  No gallops, murmurs, or rubs.  Normal PMI, no JVD.  No pulse deficits.             

      Respiratory:  Lungs have equal breath sounds bilaterally, clear to auscultation and         

      percussion.  No rales, rhonchi or wheezes noted.  No increased work of breathing, no        

      retractions or nasal flaring.                                                               

19:39 Back:  No spinal tenderness.  No costovertebral tenderness.  Full range of motion.          

      Skin:  Warm, dry with normal turgor.  Normal color with no rashes, no lesions, and no       

      evidence of cellulitis. MS/ Extremity:  Pulses equal, no cyanosis.  Neurovascular           

      intact.  Full, normal range of motion. Neuro:  Awake and alert, GCS 15, oriented to         

      person, place, time, and situation.  Cranial nerves II-XII grossly intact.  Motor           

      strength 5/5 in all extremities.  Sensory grossly intact.  Cerebellar exam normal.          

      Normal gait. Psych:  Awake, alert, with orientation to person, place and time.              

      Behavior, mood, and affect are within normal limits.                                        

19:39 Constitutional: The patient appears in no acute distress, alert, awake, comfortable.        

19:39 Abdomen/GI: Inspection: abdomen appears normal, Bowel sounds: normal, in all quadrants,     

      Palpation: mild abdominal tenderness, in the epigastric area, Rectal exam: the exam is      

      deferred, because of patient request, Indicators: McBurney's point is not tender,           

      Iyer's sign is negative, Rovsing's sign is negative, Obturator sign is negative,          

      Psoas sign is negative, Liver: no appreciated palpable abnormalities, Hernia: not           

      appreciated.                                                                                

                                                                                                  

Vital Signs:                                                                                      

17:58  / 61; Pulse 89; Resp 16; Temp 98.5(O); Pulse Ox 98% ; Weight 51.26 kg; Height 5  sv  

      ft. 2 in. (157.48 cm);                                                                      

20:00  / 79; Pulse 77; Resp 16; Pulse Ox 95% ; Pain 5/10;                               mt2 

21:00  / 79; Pulse 81; Resp 16; Pulse Ox 95% ; Pain 0/10;                               mt2 

22:00  / 110; Pulse 80; Resp 22; Pulse Ox 96% ; Pain 10/10;                             mt2 

23:09  / 81; Pulse 71; Resp 16; Pulse Ox 96% ; Pain 0/10;                               mt2 

23:20  / 56; Pulse 73; Resp 16; Pulse Ox 96% ; Pain 0/10;                               mt2 

17:58 Body Mass Index 20.67 (51.26 kg, 157.48 cm)                                             sv  

                                                                                                  

MDM:                                                                                              

19:18 Patient medically screened.                                                             [facility] 

23:58 Differential Diagnosis Dehydration, Malaise, Malignancy. Data reviewed: vital signs,    [facility] 

      nurses notes, lab test result(s), cardiac enzymes, CBC, electrolytes, urinalysis, EKG,      

      radiologic studies, CT scan. Data interpreted: Pulse oximetry: on room air is 96 %.         

      Interpretation: normal. Counseling: I had a detailed discussion with the patient and/or     

      guardian regarding: the historical points, exam findings, and any diagnostic results        

      supporting the discharge/admit diagnosis, lab results, radiology results, the need for      

      outpatient follow up, an OB/Gyne specialist, to return to the emergency department if       

      symptoms worsen or persist or if there are any questions or concerns that arise at          

      home. Response to treatment: the patient's symptoms have resolved after treatment, the      

      patient's blood pressure is in an acceptable range, mental status has returned to           

      baseline, the patient no longer shows bradycardia, the patient is not short of breath,      

      the patient is not tachycardic, the patient's pain is gone, the patient's temperature       

      has normalized, patient is well hydrated.                                                   

                                                                                             

06:38 Response to treatment: the patient is now symptom free.                                 [facility] 

                                                                                                  

                                                                                             

19:19 Order name: Basic Metabolic Panel; Complete Time: 20:18                                 [facility] 

                                                                                             

19:19 Order name: CBC with Diff; Complete Time: 20:18                                         [facility] 

                                                                                             

19:19 Order name: Hepatic Function; Complete Time: 20:18                                      [facility] 

                                                                                             

19:19 Order name: Lipase; Complete Time: 20:18                                                [facility] 

                                                                                             

19:19 Order name: Magnesium; Complete Time: 20:18                                             [facility] 

                                                                                             

22:15 Order name: Troponin (emerg Dept Use Only); Complete Time: 23:01                        [facility] 

                                                                                             

19:19 Order name: IV Saline Lock; Complete Time: 19:36                                        [facility] 

                                                                                             

19:19 Order name: Labs collected and sent; Complete Time: 19:36                               [facility] 

                                                                                             

19:19 Order name: Urine Dipstick-Ancillary (obtain specimen); Complete Time: 22:57            [facility] 

                                                                                             

19:19 Order name: EKG - Nurse/Tech; Complete Time: 19:36                                      [facility] 

                                                                                             

20:19 Order name: CT Abd/Pelvis - IV Contrast Only                                            [facility] 

                                                                                             

22:58 Order name: Urine Dipstick--Ancillary (enter results); Complete Time: 00:05             [facility] 

                                                                                                  

Administered Medications:                                                                         

                                                                                             

19:44 Drug: NS 0.9% 1000 ml Route: IV; Rate: 1000 ml; Site: right forearm;                    mt2 

20:30 Follow up: Response: No adverse reaction; IV Status: Completed infusion                 mt2 

19:44 Drug: Pepcid 20 mg Route: IVP; Site: right forearm;                                     mt2 

20:30 Follow up: Response: No adverse reaction; Pain is decreased                             mt2 

                                                                                                  

                                                                                                  

Disposition:                                                                                      

                                                                                             

06:38 Co-signature as Attending Physician, Allan Johnson MD.                                 [facility] 

                                                                                                  

Disposition:                                                                                      

20 00:03 Discharged to Home. Impression: Abdominal Pain, Cervical Mass, Urinary tract       

  infection, site not specified.                                                                  

- Condition is Stable.                                                                            

- Discharge Instructions: Dysuria, Urinary Tract Infection, Adult, Easy-to-Read,                  

  Abdominal Pain, Adult, Easy-to-Read.                                                            

- Prescriptions for Pyridium 200 mg Oral Tablet - take 1 tablet by ORAL route every 8             

  hours for 2 days; 6 tablet. Keflex 500 mg Oral Capsule - take 1 capsule by ORAL route           

  every 12 hours for 7 days; 14 capsule.                                                          

- Medication Reconciliation Form, Thank You Letter, Antibiotic Education, Prescription            

  Opioid Use form.                                                                                

- Follow up: Private Physician; When: 1 - 2 days; Reason: Worsening of condition,                 

  Recheck today's complaints, Continuance of care, Re-evaluation by your physician.               

  Follow up: Shayy Vizcaino MD; When: 1 - 2 days; Reason: Worsening of condition,             

  Continuance of care.                                                                            

- Problem is an ongoing problem.                                                                  

- Symptoms have improved.                                                                         

                                                                                                  

                                                                                                  

                                                                                                  

Signatures:                                                                                       

Dispatcher [employer]                           EDLynn Delong RN                    RN                                                      

Allan Johnson MD MD   7                                                  

Emani Vance RN                    RN   mt2                                                  

                                                                                                  

Corrections: (The following items were deleted from the chart)                                    

00:06 00:03 2020 00:03 Discharged to Home. Impression: Abdominal Pain; Cervical Mass.   mh7 

      Condition is Stable. Forms are Medication Reconciliation Form, Thank You Letter,            

      Antibiotic Education, Prescription Opioid Use. Follow up: Private Physician; When: 1 -      

      2 days; Reason: Worsening of condition, Recheck today's complaints, Continuance of          

      care, Re-evaluation by your physician. Follow up: Shayy Vizcaino; When: 1 - 2 days;       

      Reason: Worsening of condition, Continuance of care. Problem is an ongoing problem.         

      Symptoms have improved. 7                                                                 

00:45 00:06 2020 00:03 Discharged to Home. Impression: Abdominal Pain; Cervical Mass;   mt2 

      Urinary tract infection, site not specified. Condition is Stable. Discharge                 

      Instructions: Abdominal Pain, Adult, Easy-to-Read, Dysuria, Urinary Tract Infection,        

      Adult, Easy-to-Read. Prescriptions for Pyridium 200 mg Oral Tablet - take 1 tablet by       

      ORAL route every 8 hours for 2 days; 6 tablet, Keflex 500 mg Oral Capsule - take 1          

      capsule by ORAL route every 12 hours for 7 days; 14 capsule. and Forms are Medication       

      Reconciliation Form, Thank You Letter, Antibiotic Education, Prescription Opioid Use.       

      Follow up: Private Physician; When: 1 - 2 days; Reason: Worsening of condition, Recheck     

      today's complaints, Continuance of care, Re-evaluation by your physician. Follow up:        

      Shayy Vizcaino; When: 1 - 2 days; Reason: Worsening of condition, Continuance of          

      care. Problem is an ongoing problem. Symptoms have improved. 7                            

                                                                                                  

**************************************************************************************************

## 2020-09-03 VITALS — TEMPERATURE: 98.5 F

## 2020-09-03 VITALS — OXYGEN SATURATION: 96 %

## 2020-09-03 VITALS — DIASTOLIC BLOOD PRESSURE: 56 MMHG | SYSTOLIC BLOOD PRESSURE: 133 MMHG

## 2020-09-28 ENCOUNTER — HOSPITAL ENCOUNTER (EMERGENCY)
Dept: HOSPITAL 97 - ER | Age: 75
Discharge: ANOTHER HOSPITAL | End: 2020-09-28
Payer: COMMERCIAL

## 2020-09-28 VITALS — SYSTOLIC BLOOD PRESSURE: 120 MMHG | DIASTOLIC BLOOD PRESSURE: 72 MMHG

## 2020-09-28 VITALS — OXYGEN SATURATION: 100 %

## 2020-09-28 VITALS — TEMPERATURE: 98.4 F

## 2020-09-28 DIAGNOSIS — E11.9: ICD-10-CM

## 2020-09-28 DIAGNOSIS — C53.9: ICD-10-CM

## 2020-09-28 DIAGNOSIS — Z95.1: ICD-10-CM

## 2020-09-28 DIAGNOSIS — Z79.82: ICD-10-CM

## 2020-09-28 DIAGNOSIS — D72.829: ICD-10-CM

## 2020-09-28 DIAGNOSIS — Z88.2: ICD-10-CM

## 2020-09-28 DIAGNOSIS — I82.401: Primary | ICD-10-CM

## 2020-09-28 DIAGNOSIS — R63.0: ICD-10-CM

## 2020-09-28 DIAGNOSIS — N39.0: ICD-10-CM

## 2020-09-28 DIAGNOSIS — I10: ICD-10-CM

## 2020-09-28 LAB
ALBUMIN SERPL BCP-MCNC: 3.2 G/DL (ref 3.4–5)
ALP SERPL-CCNC: 58 U/L (ref 45–117)
ALT SERPL W P-5'-P-CCNC: 17 U/L (ref 12–78)
AST SERPL W P-5'-P-CCNC: 28 U/L (ref 15–37)
BUN BLD-MCNC: 10 MG/DL (ref 7–18)
GLUCOSE SERPLBLD-MCNC: 141 MG/DL (ref 74–106)
HCT VFR BLD CALC: 35.1 % (ref 36–45)
LIPASE SERPL-CCNC: 53 U/L (ref 73–393)
LYMPHOCYTES # SPEC AUTO: 1.1 K/UL (ref 0.7–4.9)
MAGNESIUM SERPL-MCNC: 2.1 MG/DL (ref 1.8–2.4)
MORPHOLOGY BLD-IMP: (no result)
NT-PROBNP SERPL-MCNC: 2044 PG/ML (ref ?–125)
PMV BLD: 8.8 FL (ref 7.6–11.3)
POTASSIUM SERPL-SCNC: 3.8 MMOL/L (ref 3.5–5.1)
RBC # BLD: 3.98 M/UL (ref 3.86–4.86)
TROPONIN (EMERG DEPT USE ONLY): < 0.02 NG/ML (ref 0–0.04)

## 2020-09-28 PROCEDURE — 85025 COMPLETE CBC W/AUTO DIFF WBC: CPT

## 2020-09-28 PROCEDURE — 83605 ASSAY OF LACTIC ACID: CPT

## 2020-09-28 PROCEDURE — 96368 THER/DIAG CONCURRENT INF: CPT

## 2020-09-28 PROCEDURE — 82565 ASSAY OF CREATININE: CPT

## 2020-09-28 PROCEDURE — 96361 HYDRATE IV INFUSION ADD-ON: CPT

## 2020-09-28 PROCEDURE — 96372 THER/PROPH/DIAG INJ SC/IM: CPT

## 2020-09-28 PROCEDURE — 83735 ASSAY OF MAGNESIUM: CPT

## 2020-09-28 PROCEDURE — 83690 ASSAY OF LIPASE: CPT

## 2020-09-28 PROCEDURE — 87088 URINE BACTERIA CULTURE: CPT

## 2020-09-28 PROCEDURE — 93971 EXTREMITY STUDY: CPT

## 2020-09-28 PROCEDURE — 81003 URINALYSIS AUTO W/O SCOPE: CPT

## 2020-09-28 PROCEDURE — 51702 INSERT TEMP BLADDER CATH: CPT

## 2020-09-28 PROCEDURE — 83880 ASSAY OF NATRIURETIC PEPTIDE: CPT

## 2020-09-28 PROCEDURE — 74176 CT ABD & PELVIS W/O CONTRAST: CPT

## 2020-09-28 PROCEDURE — 84484 ASSAY OF TROPONIN QUANT: CPT

## 2020-09-28 PROCEDURE — 36415 COLL VENOUS BLD VENIPUNCTURE: CPT

## 2020-09-28 PROCEDURE — 87040 BLOOD CULTURE FOR BACTERIA: CPT

## 2020-09-28 PROCEDURE — 96365 THER/PROPH/DIAG IV INF INIT: CPT

## 2020-09-28 PROCEDURE — 80048 BASIC METABOLIC PNL TOTAL CA: CPT

## 2020-09-28 PROCEDURE — 87086 URINE CULTURE/COLONY COUNT: CPT

## 2020-09-28 PROCEDURE — 99285 EMERGENCY DEPT VISIT HI MDM: CPT

## 2020-09-28 PROCEDURE — 93005 ELECTROCARDIOGRAM TRACING: CPT

## 2020-09-28 PROCEDURE — 71045 X-RAY EXAM CHEST 1 VIEW: CPT

## 2020-09-28 PROCEDURE — 80076 HEPATIC FUNCTION PANEL: CPT

## 2020-09-28 PROCEDURE — 96375 TX/PRO/DX INJ NEW DRUG ADDON: CPT

## 2020-09-28 NOTE — RAD REPORT
[facility]  [facility]

 

CLINICAL HISTORY:  Right leg pain and swelling.

 

COMPARISON:  None.

 

FINDINGS:  Echogenic material consistent with acute thrombus is present within the right common femor
al, right greater saphenous, right superficial femoral, right popliteal and tibial veins. There is li
ttle flow

 

.

 

IMPRESSION:  Occlusive thrombus throughout the right lower extremity

## 2020-09-28 NOTE — RAD REPORT
EXAM DESCRIPTION:  RAD - Chest Single View - 9/28/2020 11:52 am

 

CLINICAL HISTORY:  COUGH

 

COMPARISON:  August 2017

 

TECHNIQUE:  AP portable chest image was obtained 9/28/2020 11:52 am .

 

FINDINGS:  Lungs are clear of an acute process. Interstitial pattern matches comparison. Sternotomy w
ires are in place. Heart and vasculature are normal. No measurable pleural effusion and no pneumothor
ax. No acute bony abnormality seen. No acute aortic findings suspected.

 

IMPRESSION:  No acute cardiopulmonary process.

 

No significant change from comparison.

## 2020-09-28 NOTE — ER
Nurse's Notes                                                                                     

 [facility]                                                                 

Name: Va Hughes                                                                              

Age: 75 yrs                                                                                       

Sex: Female                                                                                       

: 1945                                                                                   

MRN: [id number]                                                                                   

Arrival Date: 2020                                                                          

Time: 10:48                                                                                       

Account#: [id number]                                                                            

Bed 19                                                                                            

Private MD:                                                                                       

Diagnosis: Acute embolism and thrombosis of deep veins of lower extremity-right;Elevated white    

  blood cell count;Malignant neoplasm of cervix uteri, unspecified-advanced;Urinary               

  tract infection, site not specified;Type 2 diabetes mellitus;Anorexia                           

                                                                                                  

Presentation:                                                                                     

                                                                                             

10:48 Chief complaint: EMS states: Generalized body weakness x 2-3 months, loss of appetite   ca1 

      2-3 months. LUQ pain. Denies N/V/Diarrhea. /78, HR 94, temp 97.1F, 100% sats on       

      RA. Chief complaint: Patient states: I woke up this morning with SBP at 60s, felt very      

      dizzy and very weak and I had SOB when I walked. I have not have an appetite for 2-3        

      months, I lost weight from 140 to 107lbs in just 2-3 months. I also have abdominal pain     

      from ulcer. I also have back pain on the R side, R leg pain up to the R groin area that     

      it hurts to walk. It also appears like my R leg is bigger than the L. Coronavirus           

      screen: Client denies travel out of the [place] in the last 14 days. At this time, the         

      client does not indicate any symptoms associated with coronavirus-19. Ebola Screen:         

      Patient negative for fever greater than or equal to 101.5 degrees Fahrenheit, and           

      additional compatible Ebola Virus Disease symptoms Patient denies exposure to               

      infectious person. Patient denies travel to an Ebola-affected area in the 21 days           

      before illness onset. No symptoms or risks identified at this time. Initial Sepsis          

      Screen: Does the patient meet any 2 criteria? No. Patient's initial sepsis screen is        

      negative. Does the patient have a suspected source of infection? No. Patient's initial      

      sepsis screen is negative. Risk Assessment: Do you want to hurt yourself or someone         

      else? Patient reports no desire to harm self or others. Onset of symptoms was 2020.                                                                                   

10:48 Method Of Arrival: EMS: [facility]                                                    ca1 

10:48 Acuity: EDMOND 3                                                                           ca1 

                                                                                                  

Triage Assessment:                                                                                

10:54 General: Appears in no apparent distress. comfortable, Behavior is calm, cooperative,   ca1 

      appropriate for age. Pain: Complains of pain in back, abdomen and right leg Pain            

      currently is 3 out of 10 on a pain scale. EENT: No signs and/or symptoms were reported      

      regarding the EENT system. Neuro: Level of Consciousness is awake, alert, obeys             

      commands, Oriented to person, place, time, situation. Cardiovascular: Heart tones S1 S2     

      present Capillary refill < 3 seconds Patient's skin is warm and dry. Rhythm is sinus        

      rhythm. Respiratory: Airway is patent Respiratory effort is even, unlabored,                

      Respiratory pattern is regular, symmetrical, Breath sounds are clear bilaterally. GI:       

      Abdomen is flat, non-distended, Bowel sounds present X 4 quads. Abd is soft X 4 quads       

      Abdomen is tender to palpation X 4 quads. Reports anorexia. : No signs and/or             

      symptoms were reported regarding the genitourinary system. Derm: Skin is intact, is         

      healthy with good turgor, Skin is pink, warm \T\ dry. Musculoskeletal: Circulation,         

      motion, and sensation intact. Capillary refill < 3 seconds.                                 

                                                                                                  

Historical:                                                                                       

- Allergies:                                                                                      

10:54 Sulfa (Sulfonamide Antibiotics);                                                        ca1 

- Home Meds:                                                                                      

10:54 Metoprolol Tartrate Oral [Active]; Aspirin Oral [Active];                               ca1 

- PMHx:                                                                                           

10:54 Diabetes - NIDDM; Hypertension; Myocardial infarction;                                  ca1 

- PSHx:                                                                                           

10:54 CABG;                                                                                   ca1 

                                                                                                  

- Immunization history:: Adult Immunizations up to date.                                          

- Social history:: Smoking status: Patient denies any tobacco usage or history of.                

- Family history:: not pertinent.                                                                 

                                                                                                  

                                                                                                  

Screening:                                                                                        

10:56 Abuse screen: Denies threats or abuse. Denies injuries from another. Nutritional        ca1 

      screening: No deficits noted. Tuberculosis screening: No symptoms or risk factors           

      identified. Fall Risk IV access (20 points). Gait- Weak (10 pts.). Total Lopez Fall         

      Scale indicates Low Risk Score (25-44 pts). Fall prevention measures have been              

      instituted. Side Rails Up X 2 Family Present and informed to notify staff if they need      

      to leave bedside As available Patient and Family Educated on Fall Prevention Program        

      and strategies.                                                                             

                                                                                                  

Assessment:                                                                                       

10:56 Reassessment: See triage notes.                                                         ca1 

11:34 Reassessment: Pt states, :"I had chest pains with IV contrast, but denies true allergy  ca1 

      reactions" Notified provider, antihistamines ordered. Notified patient, pt states, "I       

      don't want it, I don't want to go through that again". Notified provider. VO cancel         

      antihistamines and change CT order to without contrast.                                     

11:38 Reassessment: PT TO RADIOLOGY.                                                          bp  

12:55 Reassessment: Patient appears in no apparent distress at this time. Patient and/or      ca1 

      family updated on plan of care and expected duration. Pain level reassessed. Patient is     

      alert, oriented x 3, equal unlabored respirations, skin warm/dry/pink.                      

13:50 Reassessment: Patient appears in no apparent distress at this time. Patient and/or      ca1 

      family updated on plan of care and expected duration. Pain level reassessed. Patient is     

      alert, oriented x 3, equal unlabored respirations, skin warm/dry/pink.                      

14:22 Reassessment: PT SEEN BY SOCIAL WORK, TRANSFER IN PROCESS.                              bp  

14:42 Reassessment: Patient appears in no apparent distress at this time. Patient and/or      ca1 

      family updated on plan of care and expected duration. Pain level reassessed. Patient is     

      alert, oriented x 3, equal unlabored respirations, skin warm/dry/pink.                      

14:43 Reassessment: Called report to GRACY Min at [facility].                             ca1 

15:43 Reassessment: Patient appears in no apparent distress at this time. Patient and/or      ca1 

      family updated on plan of care and expected duration. Pain level reassessed. Patient is     

      alert, oriented x 3, equal unlabored respirations, skin warm/dry/pink.                      

                                                                                                  

Vital Signs:                                                                                      

10:48  / 79; Pulse 94; Resp 19 S; Temp 98.4(O); Pulse Ox 99% on R/A; Weight 48.53 kg    ca1 

      (R); Height 5 ft. 2 in. (157.48 cm) (R); Pain 3/10;                                         

11:15  / 89; Pulse 95; Resp 18 S; Pulse Ox 100% on R/A;                                 ca1 

12:55  / 65; Pulse 73; Resp 20 S; Pulse Ox 100% on R/A;                                 ca1 

13:30  / 84; Pulse 86; Resp 19 S; Pulse Ox 99% on R/A;                                  ca1 

14:23  / 73; Pulse 81; Resp 22; Pulse Ox 100% ;                                         bp  

15:43  / 72; Pulse 81; Resp 19 S; Pulse Ox 100% on R/A;                                 ca1 

10:48 Body Mass Index 19.57 (48.53 kg, 157.48 cm)                                             ca1 

                                                                                                  

ED Course:                                                                                        

10:48 Patient arrived in ED.                                                                  ca1 

10:53 Triage completed.                                                                       ca1 

10:54 Arm band placed on right wrist.                                                         ca1 

10:55 uQan Crooks MD is Attending Physician.                                             yuniel 

10:56 Patient has correct armband on for positive identification. Placed in gown. Bed in low  ca1 

      position. Call light in reach. Side rails up X2. Cardiac monitor on. Pulse ox on. NIBP      

      on. Warm blanket given.                                                                     

10:58 Roselia Kuhn, RN is Primary Nurse.                                                      ca1 

11:31 Initial lab(s) drawn, by me, sent to lab. Inserted saline lock: 20 gauge in left        dh3 

      forearm, using aseptic technique. Blood collected.                                          

11:33 First set of blood cultures drawn by me.                                                dh3 

11:40 Second set of blood cultures drawn by me.                                               dh3 

11:49 Notified ED physician of a critical lab result(s). WBC 21.5.                            ca1 

12:00 Straight cath inserted, using sterile technique, 18 Fr. Specimen obtained. Returned     ca1 

      cloudy urine. Patient tolerated well.                                                       

12:16 CT completed. Patient tolerated procedure well. Patient moved to CT via stretcher.      sw  

      Patient moved back from CT.                                                                 

12:41 US Extremity Venous Unilateral Ltd In Process Unspecified.                              EDMS

12:42 CT Abd/Pelvis - Without Contrast In Process Unspecified.                                EDMS

14:09 initiated transfer to [facility],  accepted intransfer by dr Garcia, admin    bd  

      approval given by Reba Rand.                                                          

14:44 No provider procedures requiring assistance completed. Patient transferred, IV remains  ca1 

      in place.                                                                                   

                                                                                                  

Administered Medications:                                                                         

11:33 Not Given (Patient Refused): Benadryl 25 mg IVP once                                    ca1 

11:33 Not Given (Patient Refused): Pepcid 20 mg IVP once                                      ca1 

11:33 Not Given (Patient Refused): SOLU-Medrol 2 mg/kg IVP once                               ca1 

11:37 Drug: NS 0.9% 500 ml Route: IV; Rate: bolus; Site: left forearm;                        ca1 

13:55 Follow up: Response: No adverse reaction; IV Status: Completed infusion; IV Intake:     ca1 

      500ml                                                                                       

11:50 Drug: NS 0.9% 1000 ml Route: IV; Rate: 125 ml/hr; Site: left antecubital;               bp  

14:45 Follow up: Response: No adverse reaction; IV Status: Infusion continued upon transfer   ca1 

12:55 Drug: Rocephin 1 grams Route: IV; Rate: per protocol; Site: right forearm;              ca1 

13:27 Follow up: Response: No adverse reaction; IV Status: Completed infusion                 ca1 

13:00 Drug: Pepcid 20 mg Route: IVP; Site: left forearm;                                      ca1 

14:32 Follow up: Response: No adverse reaction                                                ca1 

13:03 Drug: Flagyl 500 mg Volume: 100 ml; Route: IVPB; Rate: 200 ml/hr; Infused Over: 30      ca1 

      mins; Site: left forearm;                                                                   

14:31 Follow up: Response: No adverse reaction; IV Status: Completed infusion; IV Intake:     ca1 

      100ml                                                                                       

13:26 Drug: Lovenox 1 mg/kg Route: Sub-Q; Site: right lower abdomen;                          ca1 

14:32 Follow up: Response: No adverse reaction                                                ca1 

                                                                                                  

                                                                                                  

Intake:                                                                                           

13:55 IV: 500ml; Total: 500ml.                                                                ca1 

14:31 IV: 100ml; Total: 600ml.                                                                ca1 

                                                                                                  

Outcome:                                                                                          

13:08 ER care complete, transfer ordered by MD. brice 

15:43 Transferred by ground EMS to [facility], Transfer form          ca1 

      completed. X-rays sent w/ patient.                                                          

15:43 Condition: stable                                                                           

15:43 Instructed on the need for transfer.                                                        

15:54 Patient left the ED.                                                                    ca1 

                                                                                                  

Signatures:                                                                                       

Dispatcher [employer]                           EDMS                                                 

Emmanuelle Franco Corey, MD MD cha Warren, Shannon sw Herrera, Deanna                              [facility]                                                  

Terrance Edmondson, RN                      RN   bp                                                   

Roselia Kuhn RN                        RN   ca1                                                  

                                                                                                  

Corrections: (The following items were deleted from the chart)                                    

10:56 10:54 GI: Abdomen is flat, non-distended, Bowel sounds present X 4 quads. Abd is soft X ca1 

      4 quads Abdomen is tender to palpation X 4 quads. ca1                                       

13:27 13:00 Flagyl 500 mg 100 ml IVPB at 200 ml/hr in left forearm over 30 mins 100 ml ca1    ca1 

14:44 13:30  / 84; Pulse 86bpm; Resp 16bpm; Spontaneous; Pulse Ox 99% RA; ca1           ca1 

                                                                                                  

**************************************************************************************************

## 2020-09-28 NOTE — RAD REPORT
EXAM DESCRIPTION:  CT - Abdomen   Pelvis Wo Contrast - 9/28/2020 12:17 pm

 

CLINICAL HISTORY:  ABD PAIN

 

COMPARISON:  Abdomen   Pelvis W Contrast dated 8/30/2020

 

TECHNIQUE:  Axial 5 mm thick CT imaging of the abdomen and pelvis was performed without IV contrast. 
Patient declined use of oral and IV contrast.

 

 

All CT scans are performed using dose optimization technique as appropriate and may include automated
 exposure control or mA/KV adjustment according to patient size.

 

FINDINGS:  No suspicious findings in the lung bases.

 

The liver, spleen and pancreas show no suspicious findings on non-contrast imaging. Gallbladder and b
iliary tree are also without suspicious finding.

 

Large densely calcified staghorn calculus fills the pelvis and calices of the right collecting system
. No left-sided staghorn calculus. Small punctate calyx calculi noted on the left. No hydronephrosis.
 Small right adrenal mass matches comparison. Isodense renal masses and pyelonephritis cannot be excl
uded in the absence of IV contrast. Partially filled urinary bladder shows no suspicious findings.

 

Again noted is the large masslike thickening of the cervix. This has not clearly changed over the issa
rt interval since August 30 imaging. Malignancy the cervix remains a primary consideration. Tortuous 
vessels are seen adjacent to the cervix. No definitive pelvic lymphadenopathy seen. No ovarian abnorm
ality identified.

 

No dilated bowel loops or bowel wall thickening.  No free air, free fluid or inflammatory stranding. 
No hernia, omental thickening or bulky lymphadenopathy peer

 

Disc and bony degenerative changes are present.

 

 

IMPRESSION:  Irregular masslike thickening of the cervix again noted. This has the appearance of prim
carlos cervix malignancy but is not clearly different from the August 30 study.

 

No obstruction, free air or surgically emergent finding. Additional nonacute findings detailed in the
 body of the report.

## 2020-09-28 NOTE — EDPHYS
Physician Documentation                                                                           

 [facility]                                                                 

Name: Va Hughes                                                                              

Age: 75 yrs                                                                                       

Sex: Female                                                                                       

: 1945                                                                                   

MRN: [id number]                                                                                   

Arrival Date: 2020                                                                          

Time: 10:48                                                                                       

Account#: [id number]                                                                            

Bed 19                                                                                            

Private MD:                                                                                       

ED Physician Quan Crooks                                                                      

HPI:                                                                                              

                                                                                             

11:28 This 75 yrs old  Female presents to ER via EMS with complaints of General      yuniel 

      Weakness.                                                                                   

11:28 The patient presents with decreased range of motion, pain, swelling, tenderness. The    yuniel 

      complaints affect the lateral aspect of right thigh, lateral aspect of right calf,          

      right hamstring, right calf, medial aspect of right thigh, medial aspect of right calf,     

      right quadriceps and right shin. Context: The problem was sustained at an unknown site.     

      Onset: The symptoms/episode began/occurred 3 day(s) ago. Modifying factors: The             

      symptoms are alleviated by nothing. the symptoms are aggravated by movement. Associated     

      signs and symptoms: Pertinent positives: calf tenderness, swelling. The patient             

      presents with abdominal pain in the upper abdomen, in the lower abdomen. Onset: The         

      symptoms/episode began/occurred 3 day(s) ago. weakness , cant eat, abdominal pain,          

      significant weight loss.                                                                    

                                                                                                  

Historical:                                                                                       

- Allergies:                                                                                      

10:54 Sulfa (Sulfonamide Antibiotics);                                                        ca1 

- Home Meds:                                                                                      

10:54 Metoprolol Tartrate Oral [Active]; Aspirin Oral [Active];                               ca1 

- PMHx:                                                                                           

10:54 Diabetes - NIDDM; Hypertension; Myocardial infarction;                                  ca1 

- PSHx:                                                                                           

10:54 CABG;                                                                                   ca1 

                                                                                                  

- Immunization history:: Adult Immunizations up to date.                                          

- Social history:: Smoking status: Patient denies any tobacco usage or history of.                

- Family history:: not pertinent.                                                                 

                                                                                                  

                                                                                                  

ROS:                                                                                              

11:28 Constitutional: Negative for fever, chills, and weight loss, Eyes: Negative for injury, yuniel 

      pain, redness, and discharge, ENT: Negative for injury, pain, and discharge, Neck:          

      Negative for injury, pain, and swelling, Cardiovascular: Negative for chest pain,           

      palpitations, and edema, Respiratory: Negative for shortness of breath, cough,              

      wheezing, and pleuritic chest pain, Back: Negative for injury and pain, : Negative        

      for injury, bleeding, discharge, and swelling, Skin: Negative for injury, rash, and         

      discoloration, Psych: Negative for depression, anxiety, suicide ideation, homicidal         

      ideation, and hallucinations, Allergy/Immunology: Negative for hives, rash, and             

      allergies, Endocrine: Negative for neck swelling, polydipsia, polyuria, polyphagia, and     

      marked weight changes.                                                                      

11:28 Abdomen/GI: Positive for abdominal pain, nausea, abdominal cramps, anorexia.                

11:28 MS/extremity: Positive for pain, swelling, of the right leg.                                

                                                                                                  

Exam:                                                                                             

11:28 Constitutional:  This is a well developed, well nourished patient who is awake, alert,  yuniel 

      and in no acute distress. Head/Face:  Normocephalic, atraumatic. Eyes:  Pupils equal        

      round and reactive to light, extra-ocular motions intact.  Lids and lashes normal.          

      Conjunctiva and sclera are non-icteric and not injected.  Cornea within normal limits.      

      Periorbital areas with no swelling, redness, or edema. ENT:  Nares patent. No nasal         

      discharge, no septal abnormalities noted.  Tympanic membranes are normal and external       

      auditory canals are clear.  Oropharynx with no redness, swelling, or masses, exudates,      

      or evidence of obstruction, uvula midline.  Mucous membranes moist. Neck:  Trachea          

      midline, no thyromegaly or masses palpated, and no cervical lymphadenopathy.  Supple,       

      full range of motion without nuchal rigidity, or vertebral point tenderness.  No            

      Meningismus. Chest/axilla:  Normal chest wall appearance and motion.  Nontender with no     

      deformity.  No lesions are appreciated. Cardiovascular:  Regular rate and rhythm with a     

      normal S1 and S2.  No gallops, murmurs, or rubs.  Normal PMI, no JVD.  No pulse             

      deficits. Respiratory:  Lungs have equal breath sounds bilaterally, clear to                

      auscultation and percussion.  No rales, rhonchi or wheezes noted.  No increased work of     

      breathing, no retractions or nasal flaring. Back:  No spinal tenderness.  No                

      costovertebral tenderness.  Full range of motion. Skin:  Warm, dry with normal turgor.      

      Normal color with no rashes, no lesions, and no evidence of cellulitis. Neuro:  Awake       

      and alert, GCS 15, oriented to person, place, time, and situation.  Cranial nerves          

      II-XII grossly intact.  Motor strength 5/5 in all extremities.  Sensory grossly intact.     

       Cerebellar exam normal.  Normal gait. Psych:  Awake, alert, with orientation to            

      person, place and time.  Behavior, mood, and affect are within normal limits.               

11:28 Abdomen/GI: Inspection: abdomen appears normal, Bowel sounds: normal, Palpation: mild       

      abdominal tenderness, in all quadrants, Liver: no appreciated palpable abnormalities,       

      Hernia: not appreciated.                                                                    

13:08 ECG was reviewed by the Attending Physician.                                            [facility] 

                                                                                                  

Vital Signs:                                                                                      

10:48  / 79; Pulse 94; Resp 19 S; Temp 98.4(O); Pulse Ox 99% on R/A; Weight 48.53 kg    ca1 

      (R); Height 5 ft. 2 in. (157.48 cm) (R); Pain 3/10;                                         

11:15  / 89; Pulse 95; Resp 18 S; Pulse Ox 100% on R/A;                                 ca1 

12:55  / 65; Pulse 73; Resp 20 S; Pulse Ox 100% on R/A;                                 ca1 

13:30  / 84; Pulse 86; Resp 19 S; Pulse Ox 99% on R/A;                                  ca1 

14:23  / 73; Pulse 81; Resp 22; Pulse Ox 100% ;                                         bp  

15:43  / 72; Pulse 81; Resp 19 S; Pulse Ox 100% on R/A;                                 ca1 

10:48 Body Mass Index 19.57 (48.53 kg, 157.48 cm)                                             ca1 

                                                                                                  

MDM:                                                                                              

10:55 Patient medically screened.                                                             [facility] 

11:33 Differential diagnosis: bowel obstruction, cholecystitis, Cholelithiasis,               yuniel 

      diverticulitis, gastritis, Hepatitis, Mesenteric ischemia or infarction, non-specific       

      abd pain, pancreatitis, Peptic Ulcer Disease, Perf. Gastric Ulcer, Pyelonephritis,          

      urinary tract infection. Differential Diagnosis. Data reviewed: vital signs, nurses         

      notes, lab test result(s), EKG, radiologic studies, CT scan, plain films. Data              

      interpreted: Cardiac monitor: rate is 94 beats/min, rhythm is regular. Test                 

      interpretation: by ED physician or midlevel provider: ECG, plain radiologic studies.        

      Counseling: I had a detailed discussion with the patient and/or guardian regarding: the     

      historical points, exam findings, and any diagnostic results supporting the                 

      discharge/admit diagnosis, radiology results, the need for outpatient follow up, the        

      need for further work-up and treatment in the hospital.                                     

13:03 ED course: dw with family an d patient want to go to [facility].                   [facility] 

                                                                                                  

                                                                                             

11:00 Order name: Basic Metabolic Panel                                                       [facility] 

                                                                                             

11:00 Order name: CBC with Diff                                                               [facility] 

                                                                                             

11:00 Order name: LFT's                                                                       [facility] 

                                                                                             

11:00 Order name: Magnesium                                                                   [facility] 

                                                                                             

11:00 Order name: NT PRO-BNP                                                                  [facility] 

                                                                                             

11:00 Order name: Troponin (emerg Dept Use Only)                                              [facility] 

                                                                                             

11:00 Order name: Lipase                                                                      [facility] 

                                                                                             

11:00 Order name: Urine Culture                                                               [facility] 

                                                                                             

11:00 Order name: Blood Culture Adult (2)                                                     [facility] 

                                                                                             

11:00 Order name: Lactate                                                                     [facility] 

                                                                                             

11:56 Order name: CBC with Automated Diff; Complete Time: 12:45                               [facility]

                                                                                             

11:56 Order name: Lactate; Complete Time: 12:02                                               [facility]

                                                                                             

12:00 Order name: Basic Metabolic Panel; Complete Time: 12:02                                 [facility]

                                                                                             

12:00 Order name: Liver (Hepatic) Function; Complete Time: 12:02                              [facility]

                                                                                             

11:00 Order name: XRAY Chest (1 view)                                                         [facility] 

                                                                                             

11:27 Order name: US Extremity Venous Unilateral Ltd                                          [facility] 

                                                                                             

11:34 Order name: CT Abd/Pelvis - Without Contrast; Complete Time: 12:45                      [facility] 

                                                                                             

12:00 Order name: Troponin (Emerg Dept Use Only); Complete Time: 12:02                        [facility]

                                                                                             

12:00 Order name: NT PRO-BNP; Complete Time: 12:02                                            [facility]

                                                                                             

12:00 Order name: Magnesium; Complete Time: 12:02                                             [facility]

                                                                                             

12:00 Order name: Lipase; Complete Time: 12:02                                                [facility]

                                                                                             

12:09 Order name: RAD; Complete Time: 12:45                                                   [facility]

                                                                                             

12:13 Order name: Urine Dipstick--Ancillary (enter results)                                     

                                                                                             

12:17 Order name: Manual Differential; Complete Time: 12:45                                   [facility]

                                                                                             

12:40 Order name: CREATININE WHOLE BLOOD; Complete Time: 12:45                                [facility]

                                                                                             

11:00 Order name: EKG; Complete Time: 11:01                                                   [facility] 

                                                                                             

11:00 Order name: Cardiac monitoring; Complete Time: 11:16                                    [facility] 

                                                                                             

11:00 Order name: EKG - Nurse/Tech; Complete Time: 11:27                                      [facility] 

                                                                                             

11:00 Order name: IV Saline Lock; Complete Time: 11:27                                        [facility] 

                                                                                             

11:00 Order name: Labs collected and sent; Complete Time:                                [facility] 

                                                                                             

11:00 Order name: O2 Per Protocol; Complete Time:                                        [facility] 

                                                                                             

11:00 Order name: O2 Sat Monitoring; Complete Time:                                      [facility] 

                                                                                             

11:00 Order name: Urine Dipstick-Ancillary (obtain specimen); Complete Time: 12:08            [facility] 

                                                                                                  

EC:08 Rate is 85 beats/min. Rhythm is regular. QRS Axis is Normal. NJ interval is normal. QRS yuniel 

      interval is normal. QT interval is normal. No Q waves. T waves are Normal. No ST            

      changes noted. Clinical impression: NSR w/ Non-specific ST/T Changes and No evidence of     

      ischemia. Interpreted by me. Reviewed by me.                                                

                                                                                                  

Administered Medications:                                                                         

11:33 Not Given (Patient Refused): Benadryl 25 mg IVP once                                    ca1 

11:33 Not Given (Patient Refused): Pepcid 20 mg IVP once                                      ca1 

11:33 Not Given (Patient Refused): SOLU-Medrol 2 mg/kg IVP once                               ca1 

11:37 Drug: NS 0.9% 500 ml Route: IV; Rate: bolus; Site: left forearm;                        ca1 

13:55 Follow up: Response: No adverse reaction; IV Status: Completed infusion; IV Intake:     ca1 

      500ml                                                                                       

11:50 Drug: NS 0.9% 1000 ml Route: IV; Rate: 125 ml/hr; Site: left antecubital;               bp  

14:45 Follow up: Response: No adverse reaction; IV Status: Infusion continued upon transfer   ca1 

12:55 Drug: Rocephin 1 grams Route: IV; Rate: per protocol; Site: right forearm;              ca1 

13:27 Follow up: Response: No adverse reaction; IV Status: Completed infusion                 ca1 

13:00 Drug: Pepcid 20 mg Route: IVP; Site: left forearm;                                      ca1 

14:32 Follow up: Response: No adverse reaction                                                ca1 

13:03 Drug: Flagyl 500 mg Volume: 100 ml; Route: IVPB; Rate: 200 ml/hr; Infused Over: 30      ca1 

      mins; Site: left forearm;                                                                   

14:31 Follow up: Response: No adverse reaction; IV Status: Completed infusion; IV Intake:     ca1 

      100ml                                                                                       

13:26 Drug: Lovenox 1 mg/kg Route: Sub-Q; Site: right lower abdomen;                          ca1 

14:32 Follow up: Response: No adverse reaction                                                ca1 

                                                                                                  

                                                                                                  

Disposition:                                                                                      

20 13:08 Transfer ordered to [facility]. Diagnosis are Acute embolism and thrombosis of    

  deep veins of lower extremity - right, Elevated white blood cell count,                         

  Malignant neoplasm of cervix uteri, unspecified - advanced, Urinary tract                       

  infection, site not specified, Type 2 diabetes mellitus, Anorexia.                              

- Reason for transfer: Higher level of care.                                                      

- Accepting physician is to [facility].                                                      

- Condition is Fair.                                                                              

- Problem is new.                                                                                 

- Symptoms have improved.                                                                         

                                                                                                  

                                                                                                  

                                                                                                  

Signatures:                                                                                       

Dispatcher [employer]                           EDMS                                                 

Quan Crooks MD MD cha Peltier, Brian, RN                      RN   bp                                                   

Roselia Kuhn RN                        RN   ca1                                                  

                                                                                                  

Corrections: (The following items were deleted from the chart)                                    

13:09 13:08 2020 13:08 Transfer ordered to [facility]. Diagnosis is Acute embolism and yuniel 

      thrombosis of deep veins of lower extremity - right; Elevated white blood cell count;       

      Malignant neoplasm of cervix uteri, unspecified - advanced; Urinary tract infection,        

      site not specified. Reason for transfer: Higher level of care. Accepting physician is       

      to [facility]. Condition is Fair. Problem is new. Symptoms have improved. yuniel          

15:54 13:09 2020 13:08 Transfer ordered to [facility]. Diagnosis is Acute embolism and ca1 

      thrombosis of deep veins of lower extremity - right; Elevated white blood cell count;       

      Malignant neoplasm of cervix uteri, unspecified - advanced; Urinary tract infection,        

      site not specified; Type 2 diabetes mellitus; Anorexia. Reason for transfer: Higher         

      level of care. Accepting physician is to [facility]. Condition is Fair. Problem is     

      new. Symptoms have improved. yuniel                                                            

                                                                                                  

**************************************************************************************************

## 2020-09-29 NOTE — EKG
Test Date:    2020-09-28               Test Time:    11:26:59

Technician:   CA                                     

                                                     

MEASUREMENT RESULTS:                                       

Intervals:                                           

Rate:         85                                     

NM:           150                                    

QRSD:         72                                     

QT:           354                                    

QTc:          421                                    

Axis:                                                

P:            68                                     

NM:           150                                    

QRS:          18                                     

T:            70                                     

                                                     

INTERPRETIVE STATEMENTS:                                       

                                                     

Normal sinus rhythm

Normal ECG

Compared to ECG 08/30/2020 22:05:47

ST (T wave) deviation no longer present

Possible ischemia no longer present



Electronically Signed On 09-29-20 10:45:00 CDT by Eloy Wyman

## 2020-12-09 ENCOUNTER — HOSPITAL ENCOUNTER (EMERGENCY)
Dept: HOSPITAL 97 - ER | Age: 75
Discharge: HOME | End: 2020-12-09
Payer: COMMERCIAL

## 2020-12-09 DIAGNOSIS — I25.2: ICD-10-CM

## 2020-12-09 DIAGNOSIS — I10: ICD-10-CM

## 2020-12-09 DIAGNOSIS — N39.0: Primary | ICD-10-CM

## 2020-12-09 DIAGNOSIS — Z95.1: ICD-10-CM

## 2020-12-09 DIAGNOSIS — C53.9: ICD-10-CM

## 2020-12-09 DIAGNOSIS — Z88.2: ICD-10-CM

## 2020-12-09 LAB
BUN BLD-MCNC: 19 MG/DL (ref 7–18)
GLUCOSE SERPLBLD-MCNC: 105 MG/DL (ref 74–106)
HCT VFR BLD CALC: 33.3 % (ref 36–45)
LYMPHOCYTES # SPEC AUTO: 1.5 K/UL (ref 0.7–4.9)
PMV BLD: 8.2 FL (ref 7.6–11.3)
POTASSIUM SERPL-SCNC: 3.3 MMOL/L (ref 3.5–5.1)
RBC # BLD: 3.79 M/UL (ref 3.86–4.86)

## 2020-12-09 PROCEDURE — 74176 CT ABD & PELVIS W/O CONTRAST: CPT

## 2020-12-09 PROCEDURE — 96374 THER/PROPH/DIAG INJ IV PUSH: CPT

## 2020-12-09 PROCEDURE — 81015 MICROSCOPIC EXAM OF URINE: CPT

## 2020-12-09 PROCEDURE — 36415 COLL VENOUS BLD VENIPUNCTURE: CPT

## 2020-12-09 PROCEDURE — 87088 URINE BACTERIA CULTURE: CPT

## 2020-12-09 PROCEDURE — 80048 BASIC METABOLIC PNL TOTAL CA: CPT

## 2020-12-09 PROCEDURE — 85025 COMPLETE CBC W/AUTO DIFF WBC: CPT

## 2020-12-09 PROCEDURE — 96361 HYDRATE IV INFUSION ADD-ON: CPT

## 2020-12-09 PROCEDURE — 76377 3D RENDER W/INTRP POSTPROCES: CPT

## 2020-12-09 PROCEDURE — 81003 URINALYSIS AUTO W/O SCOPE: CPT

## 2020-12-09 PROCEDURE — 99284 EMERGENCY DEPT VISIT MOD MDM: CPT

## 2020-12-09 PROCEDURE — 87086 URINE CULTURE/COLONY COUNT: CPT

## 2020-12-09 NOTE — ER
Nurse's Notes                                                                                     

 [facility]                                                                 

Name: Va Hughes                                                                              

Age: 75 yrs                                                                                       

Sex: Female                                                                                       

: 1945                                                                                   

MRN: [id number]                                                                                   

Arrival Date: 2020                                                                          

Time: 11:33                                                                                       

Account#: [id number]                                                                            

Bed 19                                                                                            

Private MD:                                                                                       

Diagnosis: Urinary tract infection, site not specified;Malignant neoplasm of cervix uteri,        

  unspecified                                                                                     

                                                                                                  

Presentation:                                                                                     

                                                                                             

11:41 Chief complaint: urinary urgency and frequency and burning with urination x 3 weeks.    hb  

      Coronavirus screen: At this time, the client does not indicate any symptoms associated      

      with coronavirus-19. Ebola Screen: No symptoms or risks identified at this time.            

      Initial Sepsis Screen: Does the patient meet any 2 criteria? HR > 90 bpm. No. Patient's     

      initial sepsis screen is negative. Does the patient have a suspected source of              

      infection? No. Patient's initial sepsis screen is negative. Risk Assessment: Do you         

      want to hurt yourself or someone else? Patient reports no desire to harm self or            

      others. Onset of symptoms was 2020.                                                

11:41 Method Of Arrival: Wheelchair                                                           hb  

11:41 Acuity: EDMOND 3                                                                           hb  

                                                                                                  

Historical:                                                                                       

- Allergies:                                                                                      

11:43 Sulfa (Sulfonamide Antibiotics);                                                        hb  

- PMHx:                                                                                           

11:43 Diabetes - NIDDM; Hypertension; Myocardial infarction;                                  hb  

- PSHx:                                                                                           

11:43 CABG;                                                                                   hb  

                                                                                                  

- Immunization history:: Adult Immunizations up to date.                                          

- Social history:: Smoking status: Patient denies any tobacco usage or history of.                

                                                                                                  

                                                                                                  

Screenin:55 Abuse screen: Denies threats or abuse. Denies injuries from another. Nutritional        ph  

      screening: No deficits noted. Tuberculosis screening: No symptoms or risk factors           

      identified. Fall Risk None identified.                                                      

                                                                                                  

Assessment:                                                                                       

14:54 General: Appears in no apparent distress. comfortable, slender, Behavior is             ph  

      cooperative, appropriate for age, anxious, Denies fever. Pain: Complains of pain in         

      pelvis. Neuro: Level of Consciousness is awake, alert, obeys commands, Oriented to          

      person, place, time, situation. Cardiovascular: Capillary refill < 3 seconds in             

      bilateral fingers Patient's skin is warm and dry. Respiratory: Airway is patent             

      Respiratory effort is even, unlabored, Respiratory pattern is regular, symmetrical. GI:     

      Reports lower abdominal pain, constipation. : Reports burning with urination,             

      incontinence, urinary frequency, vaginal itching. Derm: Skin is intact, Skin is pink,       

      warm \T\ dry. Musculoskeletal: Circulation, motion, and sensation intact. Range of          

      motion: intact in all extremities.                                                          

16:24 Reassessment: Patient appears in no apparent distress at this time. Patient and/or      ph  

      family updated on plan of care and expected duration. Pain level reassessed. Patient is     

      alert, oriented x 3, equal unlabored respirations, skin warm/dry/pink.                      

                                                                                                  

Vital Signs:                                                                                      

11:41  / 65; Pulse 106; Resp 18; Temp 98.4; Pulse Ox 100% on R/A; Pain 4/10;            hb  

16:19  / 60; Pulse 73; Resp 18; Pulse Ox 99% on R/A;                                    ph  

17:30  / 58; Pulse 76; Resp 18; Temp 97.6; Pulse Ox 99% on R/A;                         ph  

                                                                                                  

ED Course:                                                                                        

11:33 Patient arrived in ED.                                                                  rg4 

11:35 Maia Suarez FNP-C is PHCP.                                                        kb  

11:35 Hans Sosa MD is Attending Physician.                                           kb  

11:42 Triage completed.                                                                       hb  

11:43 Arm band placed on.                                                                     hb  

14:19 Maia Suarez FNP-C is PHCP.                                                        kb  

14:20 Hans Sosa MD is Attending Physician.                                           kb  

14:24 Maria Esther Panchal, RN is Primary Nurse.                                                    ph  

14:55 Patient has correct armband on for positive identification. Bed in low position. Call   ph  

      light in reach. Side rails up X 1. Pulse ox on. NIBP on. Door closed. Noise minimized.      

      Warm blanket given.                                                                         

15:12 Inserted saline lock: 22 gauge in left forearm, using aseptic technique.                dh3 

15:23 CT Stone Protocol In Process Unspecified.                                               EDMS

17:54 No provider procedures requiring assistance completed. IV discontinued, intact,         ph  

      bleeding controlled, No redness/swelling at site. Pressure dressing applied.                

                                                                                                  

Administered Medications:                                                                         

16:15 Drug: Rocephin 1 grams Route: IV; Rate: calculated rate; Site: left wrist;              ph  

16:30 Follow up: Response: No adverse reaction; IV Status: Completed infusion                 ph  

16:18 Drug: NS 0.9% 500 ml Route: IV; Rate: bolus; Site: left forearm;                        ph  

17:15 Follow up: Response: No adverse reaction; IV Status: Completed infusion; IV Intake:     ph  

      500ml                                                                                       

17:54 Drug: DiFLUcan 100 mg Route: PO;                                                        ph  

17:55 Follow up: Response: No adverse reaction; Medication administered at discharge.         ph  

                                                                                                  

                                                                                                  

Intake:                                                                                           

17:15 IV: 500ml; Total: 500ml.                                                                ph  

                                                                                                  

Outcome:                                                                                          

17:12 Discharge ordered by MD.                                                                aung  

17:54 Patient left the ED.                                                                    ph  

17:54 Discharged to home via wheelchair.                                                      ph  

17:54 Condition: good                                                                             

17:54 Discharge instructions given to patient, Instructed on discharge instructions, follow       

      up and referral plans. medication usage, Demonstrated understanding of instructions,        

      follow-up care, medications, Prescriptions given X 1.                                       

                                                                                                  

Signatures:                                                                                       

Dispatcher [employer]                           EDMS                                                 

Maia Suarez, ANGÉLICAP-C                 FNP-Maria Esther Weeks RN                      RN                                                      

Lizeth Romero RN                     RN                                                      

Martha Blevins                                 4                                                  

Tonja Russell                              3                                                  

                                                                                                  

Corrections: (The following items were deleted from the chart)                                    

11:43 11:41 Chief complaint: urinary urgency and frequency and burning with urination x 3     hb  

      weeks. hb                                                                                   

16:24 14:54 GI: Reports lower abdominal pain, ph                                              ph  

16:24 14:54 : Reports burning with urination, incontinence, urinary frequency, ph           ph  

                                                                                                  

**************************************************************************************************

## 2020-12-09 NOTE — XMS REPORT
Continuity of Care Document

                           Created on:2020



Patient:FANTA MCCABE

Sex:Female

:1945

External Reference #:[id number]





Demographics







                          Address                   [address] [address]

 

                          Home Phone                [phone]

 

                          Mobile Phone              [phone]

 

                          Email Address             [address]

 

                          Preferred Language        English

 

                          Marital Status            Unknown

 

                          Sikh Affiliation     Unknown

 

                          Race                      Unknown

 

                          Additional Race(s)        Unavailable



                                                    Unavailable



                                                    White

 

                          Ethnic Group              Unknown









Author







                          Organization              [facility]

t

 

                          Address                   [address]



                                                    [place] [address]

 

                          Phone                     [phone]









Support







                Name            Relationship    Address         Phone

 

                Thanh (DAUGHTER) ESTELLE Child           [phone] 



                                                [place] [address] 

 

                Luiz          Mother          Unavailable     [phone]

 

                Martinez (Brother)   Sibling         Unavailable     [phone]









Care Team Providers







                    Name                Role                Phone

 

                    CHRISTINA ALMAGUER         Primary Care Physician Unavailable

 

                    SYSTEM,  NOT IN     Attending Clinician Unavailable

 

                    Doctor Unassigned,  Name Attending Clinician Unavailable

 

                    Jose BAH       Attending Clinician [phone]

 

                    Dontae DUBOSE,  A       Attending Clinician Unavailable









Problems

This patient has no known problems.



Allergies, Adverse Reactions, Alerts

This patient has no known allergies or adverse reactions.



Medications

This patient has no known medications.



Procedures

This patient has no known procedures.



Encounters







        Start   End     Encounter Admission Attending Care    Care    Encounter 

Source



        Date/Time Date/Time Type    Type    Clinicians Facility Department ID   

   

 

        2020-10-07         Outpatient         SYSTEM, [facility]     [id number]

 MD



        10:40:33                         PROVIDER                         Carlos desir

 

        2020-10-23 2020-10-23 Orders          Doctor OATES    [id number]

26 



        00:00:00 00:00:00 Only            UnassFANG neville   350.1.13.10       

  



                                        [facility] [id number]         



                                                        [phone]         



                                                        009             

 

        2020-10-09 2020-10-09 Letter          Jody Garcia  [id number]

[id number] 



        00:00:00 00:00:00 (Out)           Josh Alvarez   350.1.13.10         



                                                Hospital [id number]         



                                                        [phone]         



                                                        091             

 

        2020-10-09 2020-10-09 Orders          Doctor OATES    [id number]

34 



        00:00:00 00:00:00 Only            Unassigned, FANG   350.1.13.10       

  



                                        [facility] [id number]         



                                                        [phone]         



                                                        009             

 

        2020-10-06 2020-10-06 Telephone         Jose ROBERT 1.2.840.11

[id number] 



        00:00:00 00:00:00                 [employer] 350.1.13.10         



                                                [facility] [id number]         



                                                        [phone]         



                                                        096             

 

        2020-10-02 2020-10-02 Transition         Thomas Diggs  1.2.840.114 [id number]

[id number] 



        00:00:00 00:00:00 of Care         Didier Kauffman   350.1.13.10         



                                                [place]   [id number]         



                                                        [phone]         



                                                        403             

 

        2020-10-02 2020-10-02 Telephone         ROBERT Garcia 1.2.840.11

4 [id number] 



        00:00:00 00:00:00                 JoshKuailexue 350.1.13.10         



                                                [facility] [id number]         



                                                        [phone]         



                                                        096             







Results

This patient has no known results.

## 2020-12-09 NOTE — EDPHYS
Physician Documentation                                                                           

 [facility]                                                                 

Name: Va Hughes                                                                              

Age: 75 yrs                                                                                       

Sex: Female                                                                                       

: 1945                                                                                   

MRN: [id number]                                                                                   

Arrival Date: 2020                                                                          

Time: 11:33                                                                                       

Account#: [id number]                                                                            

Bed 19                                                                                            

Private MD:                                                                                       

ED Physician Hans Sosa                                                                    

HPI:                                                                                              

                                                                                             

16:03 This 75 yrs old  Female presents to ER via Wheelchair with complaints of       kb  

      Urinary Frequency.                                                                          

16:16 The patient presents with urinary symptoms, dysuria, frequency. Onset: The              kb  

      symptoms/episode began/occurred 3 month(s) ago. Modifying factors: The symptoms are         

      alleviated by nothing, the symptoms are aggravated by urinating. Associated signs and       

      symptoms: Pertinent positives: dysuria, urinary frequency. The patient has not              

      experienced similar symptoms in the past. The patient has not recently seen a               

      physician. Pt reports suprapubic pain, weight loss, decreased appetite, dysuria,            

      urinary frequency and constipation. States the symptoms have been going on for 3            

      months. REports BM every 4 or 5 days after intervention. "I think all of this would get     

      better if I had a hysterectomy.".                                                           

                                                                                                  

Historical:                                                                                       

- Allergies:                                                                                      

11:43 Sulfa (Sulfonamide Antibiotics);                                                        hb  

- PMHx:                                                                                           

11:43 Diabetes - NIDDM; Hypertension; Myocardial infarction;                                  hb  

- PSHx:                                                                                           

11:43 CABG;                                                                                   hb  

                                                                                                  

- Immunization history:: Adult Immunizations up to date.                                          

- Social history:: Smoking status: Patient denies any tobacco usage or history of.                

                                                                                                  

                                                                                                  

ROS:                                                                                              

15:59 Constitutional: Negative for fever, chills, and weight loss, Cardiovascular: Negative   kb  

      for chest pain, palpitations, and edema, Respiratory: Negative for shortness of breath,     

      cough, wheezing, and pleuritic chest pain, Back: Negative for injury and pain,              

      MS/Extremity: Negative for injury and deformity, Skin: Negative for injury, rash, and       

      discoloration, Neuro: Negative for headache, weakness, numbness, tingling, and seizure.     

15:59 Abdomen/GI: Positive for abdominal pain, of the suprapubic area.                            

15:59 : Positive for urinary symptoms, urinary frequency, burning with urination.               

                                                                                                  

Exam:                                                                                             

15:59 Constitutional:  This is a well developed, well nourished patient who is awake, alert,  kb  

      and in no acute distress. Head/Face:  Normocephalic, atraumatic. Chest/axilla:  Normal      

      chest wall appearance and motion.  Nontender with no deformity.  No lesions are             

      appreciated. Cardiovascular:  Regular rate and rhythm with a normal S1 and S2.  No          

      gallops, murmurs, or rubs.  Normal PMI, no JVD.  No pulse deficits. Respiratory:  Lungs     

      have equal breath sounds bilaterally, clear to auscultation and percussion.  No rales,      

      rhonchi or wheezes noted.  No increased work of breathing, no retractions or nasal          

      flaring. Back:  No spinal tenderness.  No costovertebral tenderness.  Full range of         

      motion. Skin:  Warm, dry with normal turgor.  Normal color with no rashes, no lesions,      

      and no evidence of cellulitis. MS/ Extremity:  Pulses equal, no cyanosis.                   

      Neurovascular intact.  Full, normal range of motion. Neuro:  Awake and alert, GCS 15,       

      oriented to person, place, time, and situation.  Cranial nerves II-XII grossly intact.      

      Motor strength 5/5 in all extremities.  Sensory grossly intact.  Cerebellar exam            

      normal.  Normal gait.                                                                       

15:59 Abdomen/GI: Inspection: abdomen appears normal, Bowel sounds: normal, in all quadrants,     

      Palpation: soft, in all quadrants, moderate abdominal tenderness, in the suprapubic         

      area.                                                                                       

                                                                                                  

Vital Signs:                                                                                      

11:41  / 65; Pulse 106; Resp 18; Temp 98.4; Pulse Ox 100% on R/A; Pain 4/10;            hb  

16:19  / 60; Pulse 73; Resp 18; Pulse Ox 99% on R/A;                                    ph  

17:30  / 58; Pulse 76; Resp 18; Temp 97.6; Pulse Ox 99% on R/A;                         ph  

                                                                                                  

MDM:                                                                                              

14:20 Patient medically screened.                                                             kb  

15:57 Data reviewed: vital signs, nurses notes. Data interpreted: Pulse oximetry: on room air kb  

      is 100 %. Interpretation: normal. ED course: Discussed CT findings with pt. States she      

      saw an oncologist at [facility], but she doesn't want chemo or radiation she just wants a         

      hysterectomy. STates she knows too many people that suffered during the treatment so        

      she doesn't want that. "I will just trust that God will heal me.".                          

17:11 Counseling: I had a detailed discussion with the patient and/or guardian regarding: the kb  

      historical points, exam findings, and any diagnostic results supporting the                 

      discharge/admit diagnosis, lab results, radiology results, the need for outpatient          

      follow up, a family practitioner, an OB/Gyne specialist, to return to the emergency         

      department if symptoms worsen or persist or if there are any questions or concerns that     

      arise at home. ED course: Discussed diagnostics, history and exam with Dr Sosa.          

      Agrees with outpatient follow up.                                                           

17:19 ED course: Pt educated on importance of follow up with oncology. Pt states she has the    

      oncologist's name and number at home and will call them tomorrow. PT requests to go         

      home now because she doesn't drive at night..                                               

                                                                                                  

                                                                                             

11:35 Order name: Urine Microscopic Only                                                        

                                                                                             

14:43 Order name: CBC with Diff; Complete Time: 17:07                                         kb  

                                                                                             

14:43 Order name: Basic Metabolic Panel                                                         

                                                                                             

14:57 Order name: Urine Dipstick--Ancillary (enter results)                                   bd  

                                                                                             

15:22 Order name: Urine Dipstick-Ancillary; Complete Time: 15:23                              EDMS

                                                                                             

16:36 Order name: Basic Metabolic Panel; Complete Time: 16:36                                 EDMS

                                                                                             

11:35 Order name: Urine Dipstick-Ancillary (obtain specimen); Complete Time: 15:18            kb  

                                                                                             

14:43 Order name: IV Start; Complete Time: 15:18                                              kb  

                                                                                             

15:06 Order name: CT Stone Protocol; Complete Time: 15:47                                     kb  

                                                                                                  

Administered Medications:                                                                         

16:15 Drug: Rocephin 1 grams Route: IV; Rate: calculated rate; Site: left wrist;              ph  

16:30 Follow up: Response: No adverse reaction; IV Status: Completed infusion                 ph  

16:18 Drug: NS 0.9% 500 ml Route: IV; Rate: bolus; Site: left forearm;                        ph  

17:15 Follow up: Response: No adverse reaction; IV Status: Completed infusion; IV Intake:     ph  

      500ml                                                                                       

17:54 Drug: DiFLUcan 100 mg Route: PO;                                                        ph  

17:55 Follow up: Response: No adverse reaction; Medication administered at discharge.         ph  

                                                                                                  

                                                                                                  

Disposition:                                                                                      

18:00 Co-signature as Attending Physician, Hans Sosa MD.                               ma2 

                                                                                                  

Disposition:                                                                                      

20 17:12 Discharged to Home. Impression: Urinary tract infection, site not specified,       

  Malignant neoplasm of cervix uteri, unspecified.                                                

- Condition is Stable.                                                                            

- Discharge Instructions: Urinary Tract Infection, Adult, Easy-to-Read.                           

- Prescriptions for Augmentin 875- 125 mg Oral Tablet - take 1 tablet by ORAL route               

  every 12 hours for 10 days; 20 tablet.                                                          

- Medication Reconciliation Form, Thank You Letter, Antibiotic Education, Prescription            

  Opioid Use form.                                                                                

- Follow up: Emergency Department; When: As needed; Reason: Worsening of condition.               

  Follow up: Private Physician; When: 2 - 3 days; Reason: Recheck today's complaints,             

  Continuance of care, Re-evaluation by your physician.                                           

- Notes: Follow up with Oncologist that you saw at [facility] to discuss plan for cancer                

  treatment                                                                                       

                                                                                                  

                                                                                                  

Signatures:                                                                                       

Dispatcher [employer]                           EDMS                                                 

Maia Suarez, ANGÉLICAP-C                 FNP-Maria Esther Weeks, RN                      RN                                                      

Lizeth Romero RN                     RN                                                      

Hans Sosa MD MD   ma2                                                  

                                                                                                  

Corrections: (The following items were deleted from the chart)                                    

15:08 14:44 Abdomen Pelvis W Con+CT.RAD.BRZ ordered. [facility]

17:12 17:12 2020 17:12 Discharged to Home. Impression: Urinary tract infection, site    kb  

      not specified. Condition is Stable. Forms are Medication Reconciliation Form, Thank You     

      Letter, Antibiotic Education, Prescription Opioid Use. Follow up: Emergency Department;     

      When: As needed; Reason: Worsening of condition. Follow up: Private Physician; When: 2      

      - 3 days; Reason: Recheck today's complaints, Continuance of care, Re-evaluation by         

      your physician. kb                                                                          

17:54 17:12 2020 17:12 Discharged to Home. Impression: Urinary tract infection, site    ph  

      not specified; Malignant neoplasm of cervix uteri, unspecified. Condition is Stable.        

      Forms are Medication Reconciliation Form, Thank You Letter, Antibiotic Education,           

      Prescription Opioid Use. Follow up: Emergency Department; When: As needed; Reason:          

      Worsening of condition. Follow up: Private Physician; When: 2 - 3 days; Reason: Recheck     

      today's complaints, Continuance of care, Re-evaluation by your physician. kb                

                                                                                                  

**************************************************************************************************

## 2020-12-12 ENCOUNTER — HOSPITAL ENCOUNTER (EMERGENCY)
Dept: HOSPITAL 97 - ER | Age: 75
Discharge: HOME | End: 2020-12-12
Payer: COMMERCIAL

## 2020-12-12 DIAGNOSIS — Z88.2: ICD-10-CM

## 2020-12-12 DIAGNOSIS — I10: ICD-10-CM

## 2020-12-12 DIAGNOSIS — N30.91: Primary | ICD-10-CM

## 2020-12-12 DIAGNOSIS — Z79.82: ICD-10-CM

## 2020-12-12 DIAGNOSIS — E11.9: ICD-10-CM

## 2020-12-12 DIAGNOSIS — Z95.1: ICD-10-CM

## 2020-12-12 LAB
ALBUMIN SERPL BCP-MCNC: 2.8 G/DL (ref 3.4–5)
ALP SERPL-CCNC: 48 U/L (ref 45–117)
ALT SERPL W P-5'-P-CCNC: 13 U/L (ref 12–78)
AST SERPL W P-5'-P-CCNC: 29 U/L (ref 15–37)
BUN BLD-MCNC: 15 MG/DL (ref 7–18)
GLUCOSE SERPLBLD-MCNC: 111 MG/DL (ref 74–106)
HCT VFR BLD CALC: 33.1 % (ref 36–45)
LIPASE SERPL-CCNC: 73 U/L (ref 73–393)
LYMPHOCYTES # SPEC AUTO: 0.9 K/UL (ref 0.7–4.9)
PMV BLD: 8.3 FL (ref 7.6–11.3)
POTASSIUM SERPL-SCNC: 3.4 MMOL/L (ref 3.5–5.1)
RBC # BLD: 3.76 M/UL (ref 3.86–4.86)

## 2020-12-12 PROCEDURE — 87086 URINE CULTURE/COLONY COUNT: CPT

## 2020-12-12 PROCEDURE — 96374 THER/PROPH/DIAG INJ IV PUSH: CPT

## 2020-12-12 PROCEDURE — 83690 ASSAY OF LIPASE: CPT

## 2020-12-12 PROCEDURE — 81015 MICROSCOPIC EXAM OF URINE: CPT

## 2020-12-12 PROCEDURE — 87077 CULTURE AEROBIC IDENTIFY: CPT

## 2020-12-12 PROCEDURE — 80048 BASIC METABOLIC PNL TOTAL CA: CPT

## 2020-12-12 PROCEDURE — 96361 HYDRATE IV INFUSION ADD-ON: CPT

## 2020-12-12 PROCEDURE — 85025 COMPLETE CBC W/AUTO DIFF WBC: CPT

## 2020-12-12 PROCEDURE — 36415 COLL VENOUS BLD VENIPUNCTURE: CPT

## 2020-12-12 PROCEDURE — 99284 EMERGENCY DEPT VISIT MOD MDM: CPT

## 2020-12-12 PROCEDURE — 87088 URINE BACTERIA CULTURE: CPT

## 2020-12-12 PROCEDURE — 80076 HEPATIC FUNCTION PANEL: CPT

## 2020-12-12 PROCEDURE — 51702 INSERT TEMP BLADDER CATH: CPT

## 2020-12-12 PROCEDURE — 87186 SC STD MICRODIL/AGAR DIL: CPT

## 2020-12-12 NOTE — ER
Nurse's Notes                                                                                     

 [facility]                                                                 

Name: Va Hughes                                                                              

Age: 75 yrs                                                                                       

Sex: Female                                                                                       

: 1945                                                                                   

MRN: [id number]                                                                                   

Arrival Date: 2020                                                                          

Time: 05:29                                                                                       

Account#: [id number]                                                                            

Bed 15                                                                                            

Private MD:                                                                                       

Diagnosis: Cystitis;Hematuria;Urinary tract infection, site not specified                         

                                                                                                  

Presentation:                                                                                     

                                                                                             

05:18 Chief complaint: EMS states: Reports pt complaining of possible vaginal bleeding, pt    ea  

      reports she has been having bleeding since 10 PM last night. Coronavirus screen: At         

      this time, the client does not indicate any symptoms associated with coronavirus-19.        

      Initial Sepsis Screen: Does the patient meet any 2 criteria? No. Patient's initial          

      sepsis screen is negative. Does the patient have a suspected source of infection? No.       

      Patient's initial sepsis screen is negative. Onset of symptoms was 2020.       

05:18 Method Of Arrival: EMS: [facility]                                                    ea  

05:54 Ebola Screen: No symptoms or risks identified at this time.                             ea  

05:55 Risk Assessment: Do you want to hurt yourself or someone else? Patient reports no       ea  

      desire to harm self or others.                                                              

05:55 Acuity: EDMOND 3                                                                           ea  

                                                                                                  

Triage Assessment:                                                                                

06:07 General: Appears in no apparent distress. Behavior is appropriate for age. Pain: Denies ea  

      pain.                                                                                       

                                                                                                  

Historical:                                                                                       

- Allergies:                                                                                      

05:55 Sulfa (Sulfonamide Antibiotics);                                                        ea  

- Home Meds:                                                                                      

05:55 Metoprolol Tartrate Oral [Active]; Aspirin Oral [Active];                               ea  

- PMHx:                                                                                           

05:55 Myocardial infarction; Hypertension; Diabetes - NIDDM;                                  ea  

- PSHx:                                                                                           

05:55 CABG;                                                                                   ea  

                                                                                                  

- Immunization history:: Adult Immunizations up to date.                                          

- Social history:: Smoking status: Patient denies any tobacco usage or history of.                

                                                                                                  

                                                                                                  

Screenin:52 Abuse screen: Denies threats or abuse. Nutritional screening: No deficits noted.        ea  

      Tuberculosis screening: No symptoms or risk factors identified. Fall Risk IV access (20     

      points).                                                                                    

                                                                                                  

Assessment:                                                                                       

05:20 General: Appears in no apparent distress. Behavior is appropriate for age. Pain: Denies ea  

      pain. Neuro: Level of Consciousness is awake, alert, obeys commands, Oriented to            

      person, place, time, situation. Cardiovascular: Patient's skin is warm and dry.             

      Respiratory: Airway is patent Respiratory effort is even, unlabored, Respiratory            

      pattern is regular, symmetrical. : Reports vaginal bleeding that is bright red. Derm:     

      Skin is pink, warm \T\ dry.                                                                 

06:30 Reassessment: Emigdio daughter [id number].                                                ea  

07:00 Reassessment: Patient appears in no apparent distress at this time. No changes from     jl7 

      previously documented assessment. Patient and/or family updated on plan of care and         

      expected duration. Pain level reassessed. Patient is alert, oriented x 3, equal             

      unlabored respirations, skin warm/dry/pink.                                                 

07:10 Reassessment: Dr. Minor at bedside discussing results and POC.                         jl7 

07:35 Reassessment: Pt reports h daughter is looking for a car to borrow. Pt will be          jl7 

      discharged once daughter arrives.                                                           

                                                                                                  

Vital Signs:                                                                                      

05:18  / 68; Pulse 78; Resp 16; Temp 98; Pulse Ox 97% ; Weight 45.36 kg; Height 5 ft. 2 ea  

      in. (157.48 cm);                                                                            

07:10  / 60; Pulse 56; Resp 17; Pulse Ox 100% ;                                         jl7 

05:18 Body Mass Index 18.29 (45.36 kg, 157.48 cm)                                             ea  

                                                                                                  

ED Course:                                                                                        

05:29 Patient arrived in ED.                                                                  ea  

05:45 Jesse Minor MD is Attending Physician.                                            tw4 

05:45 Straight cath inserted, using sterile technique, 16 Fr. Specimen obtained. Returned     ea  

      bloody urine. Patient tolerated well.                                                       

05:49 Annalise Cooney, RN is Primary Nurse.                                                    ea  

05:51 Inserted saline lock: 20 gauge in right forearm, using aseptic technique. Blood         ea  

      collected.                                                                                  

05:52 Patient has correct armband on for positive identification. Placed in gown. Bed in low  ea  

      position. Call light in reach. Side rails up X2.                                            

05:53 Patient placed in an exam room, on a stretcher, on pulse oximetry.                      ea  

05:55 Triage completed.                                                                       ea  

07:35 No provider procedures requiring assistance completed. IV discontinued, intact,         jl7 

      bleeding controlled, No redness/swelling at site. Pressure dressing applied.                

                                                                                                  

Administered Medications:                                                                         

06:00 Drug: NS 0.9% 500 ml Route: IV; Rate: bolus; Site: right antecubital;                   ea  

07:00 Follow up: Response: No adverse reaction; IV Status: Completed infusion; IV Intake:     jl7 

      500ml                                                                                       

07:15 Drug: Rocephin 1 grams Route: IV; Rate: calculated rate; Site: right forearm;            

07:18 Follow up: Response: No adverse reaction; IV Status: Completed infusion                  

07:32 Not Given (wrong pharmacy order): Rocephin - (cefTRIAXone) 1 grams IVPB once over 30    jl7 

      mins; (mix in 50 mL NS)                                                                     

                                                                                                  

                                                                                                  

Intake:                                                                                           

07:00 IV: 500ml; Total: 500ml.                                                                7 

                                                                                                  

Outcome:                                                                                          

07:05 Discharge ordered by MD.                                                                [email or web address] 

07:40 Discharged to home via wheelchair.                                                      jl7 

07:40 Condition: stable                                                                           

07:40 Discharge instructions given to patient, Instructed on discharge instructions, follow       

      up and referral plans. medication usage, Demonstrated understanding of instructions,        

      follow-up care, medications, Prescriptions given X 3.                                       

07:53 Patient left the ED.                                                                    7 

                                                                                                  

Signatures:                                                                                       

Idania Hendricks RN                        RN   [email or web address]                                                  

Annalise Cooney RN RN ea Wadley, Terrence, MD MD   tw4                                                  

                                                                                                  

**************************************************************************************************

## 2020-12-12 NOTE — XMS REPORT
Continuity of Care Document

                          Created on:2020



Patient:FANTA MCCABE

Sex:Female

:1945

External Reference #:[id number]





Demographics







                          Address                   [address] [address]

 

                          Home Phone                [phone]

 

                          Work Phone                [phone]

 

                          Mobile Phone              [phone]

 

                          Email Address             [address]

 

                          Preferred Language        English

 

                          Marital Status            Unknown

 

                          Scientology Affiliation     Unknown

 

                          Race                      Unknown

 

                          Additional Race(s)        Unavailable



                                                    Unavailable



                                                    White

 

                          Ethnic Group              Unknown









Author







                          Organization              [facility]

t

 

                          Address                   [address]



                                                    [place] [address]

 

                          Phone                     [phone]









Support







                Name            Relationship    Address         Phone

 

                Thanh (DAUGHTER) ESTELLE Child           [phone]





                                                [place] [address] 

 

                Luiz          Mother          Unavailable     [phone]

 

                Martinez (Brother)   Sibling         Unavailable     [phone]









Care Team Providers







                    Name                Role                Phone

 

                    CHRISTINA ALMAGUER         Primary Care Physician Unavailable

 

                    SYSTEM,  NOT IN     Attending Clinician Unavailable

 

                    Doctor Unassigned,  Name Attending Clinician Unavailable

 

                    Jose BAH       Attending Clinician [phone]

 

                    Dontae RN,  A       Attending Clinician Unavailable









Problems

This patient has no known problems.



Allergies, Adverse Reactions, Alerts

This patient has no known allergies or adverse reactions.



Medications

This patient has no known medications.



Procedures

This patient has no known procedures.



Encounters







        Start   End     Encounter Admission Attending Care    Care    Encounter 

Source



        Date/Time Date/Time Type    Type    Clinicians Facility Department ID   

   

 

        2020-10-07         Outpatient         SYSTEM, [facility]     [id number]

 MD



        10:40:33                         PROVIDER                         Carlos desir

 

        2020-10-23 2020-10-23 Orders          Doctor OATES    [id number]

26 



        00:00:00 00:00:00 Only            UnassFANG neville   [id number]       

  



                                        [facility] [id number]         



                                                        [phone]         



                                                        009             

 

        2020-10-09 2020-10-09 Letter          Jody Garcia  [id number]

[id number] 



        00:00:00 00:00:00 (Out)           Josh Alvarez   [id number]         



                                                [facility] [id number]         



                                                        [phone]         



                                                        091             

 

        2020-10-09 2020-10-09 Orders          Doctor  ИВАН    [id number]

34 



        00:00:00 00:00:00 Only            Unassigned, FANG   350.1.13.10       

  



                                        [facility] [id number]         



                                                        [phone]         



                                                        009             

 

        2020-10-06 2020-10-06 Telephone         GarciaROBERT sandoval 1.2.840.11

4 [id number] 



        00:00:00 00:00:00                 Josh     [facility] [id number]         



                                                [facility] [id number]         



                                                        [phone]         



                                                        096             

 

        2020-10-02 2020-10-02 Transition         Hyacinth Diggskarlee  1.2.840.114 [id number]

[id number] 



        00:00:00 00:00:00 of Care         Didier Kauffman   [id number]         



                                                [place]   [id number]         



                                                        [phone]         



                                                        403             

 

        2020-10-02 2020-10-02 Telephone         JACLYN Garcia 1.2.840.11

4 [id number] 



        00:00:00 00:00:00                 Josh     [facility] 350.1.13.10         



                                                [facility] [id number]         



                                                        [phone]         



                                                        096             







Results

This patient has no known results.

## 2020-12-12 NOTE — EDPHYS
Physician Documentation                                                                           

 [facility]                                                                 

Name: Va Hughes                                                                              

Age: 75 yrs                                                                                       

Sex: Female                                                                                       

: 1945                                                                                   

MRN: [id number]                                                                                   

Arrival Date: 2020                                                                          

Time: 05:29                                                                                       

Account#: [id number]                                                                            

Bed 15                                                                                            

Private MD:                                                                                       

ED Physician Jesse Minor                                                                     

HPI:                                                                                              

                                                                                             

06:08 This 75 yrs old  Female presents to ER via EMS with unknown complaint.         tw4 

06:08 Onset: The symptoms/episode began/occurred today. Modifying factors: The symptoms are   tw4 

      alleviated by nothing, the symptoms are aggravated by nothing. Associated signs and         

      symptoms: The patient has no apparent associated signs or symptoms. The patient has not     

      experienced similar symptoms in the past.                                                   

                                                                                                  

Historical:                                                                                       

- Allergies:                                                                                      

05:55 Sulfa (Sulfonamide Antibiotics);                                                        ea  

- Home Meds:                                                                                      

05:55 Metoprolol Tartrate Oral [Active]; Aspirin Oral [Active];                               ea  

- PMHx:                                                                                           

05:55 Myocardial infarction; Hypertension; Diabetes - NIDDM;                                  ea  

- PSHx:                                                                                           

05:55 CABG;                                                                                   ea  

                                                                                                  

- Immunization history:: Adult Immunizations up to date.                                          

- Social history:: Smoking status: Patient denies any tobacco usage or history of.                

                                                                                                  

                                                                                                  

ROS:                                                                                              

06:08  Positive for urinary symptoms.                                                       tw4 

06:08 Constitutional: Negative for fever, chills, and weight loss, Eyes: Negative for injury,     

      pain, redness, and discharge, Cardiovascular: Negative for chest pain, palpitations,        

      and edema, Respiratory: Negative for shortness of breath, cough, wheezing, and              

      pleuritic chest pain, Abdomen/GI: Negative for abdominal pain, nausea, vomiting,            

      diarrhea, and constipation, Back: Negative for injury and pain, MS/Extremity: Negative      

      for injury and deformity, Skin: Negative for injury, rash, and discoloration, Neuro:        

      Negative for headache, weakness, numbness, tingling, and seizure.                           

                                                                                                  

Exam:                                                                                             

06:08 Constitutional:  This is a well developed, well nourished patient who is awake, alert,  tw4 

      and in no acute distress. Head/Face:  Normocephalic, atraumatic. Chest/axilla:  Normal      

      chest wall appearance and motion.  Nontender with no deformity.  No lesions are             

      appreciated. Cardiovascular:  Regular rate and rhythm with a normal S1 and S2.  No          

      gallops, murmurs, or rubs.  Normal PMI, no JVD.  No pulse deficits. Respiratory:  Lungs     

      have equal breath sounds bilaterally, clear to auscultation and percussion.  No rales,      

      rhonchi or wheezes noted.  No increased work of breathing, no retractions or nasal          

      flaring. Back:  No spinal tenderness.  No costovertebral tenderness.  Full range of         

      motion. Skin:  Warm, dry with normal turgor.  Normal color with no rashes, no lesions,      

      and no evidence of cellulitis. MS/ Extremity:  Pulses equal, no cyanosis.                   

      Neurovascular intact.  Full, normal range of motion. Neuro:  Awake and alert, GCS 15,       

      oriented to person, place, time, and situation.  Cranial nerves II-XII grossly intact.      

      Motor strength 5/5 in all extremities.  Sensory grossly intact.  Cerebellar exam            

      normal.  Normal gait.                                                                       

06:08 Abdomen/GI: Inspection: abdomen appears normal, Bowel sounds: normal, Palpation:            

      moderate abdominal tenderness, in the suprapubic area.                                      

                                                                                                  

Vital Signs:                                                                                      

05:18  / 68; Pulse 78; Resp 16; Temp 98; Pulse Ox 97% ; Weight 45.36 kg; Height 5 ft. 2 ea  

      in. (157.48 cm);                                                                            

07:10  / 60; Pulse 56; Resp 17; Pulse Ox 100% ;                                         jl7 

05:18 Body Mass Index 18.29 (45.36 kg, 157.48 cm)                                             ea  

                                                                                                  

MDM:                                                                                              

05:48 Patient medically screened.                                                             tw4 

22:57 Differential diagnosis: urinary tract infection. Data reviewed: vital signs, nurses     tw4 

      notes. Data interpreted: Pulse oximetry: Interpretation: normal. Counseling: I had a        

      detailed discussion with the patient and/or guardian regarding: the historical points,      

      exam findings, and any diagnostic results supporting the discharge/admit diagnosis.         

      Special discussion: I discussed with the patient/guardian in detail that at this point      

      there is no indication for admission to the hospital. It is understood, however, that       

      if the symptoms persist or worsen the patient needs to return immediately for               

      re-evaluation.                                                                              

                                                                                                  

                                                                                             

05:46 Order name: Basic Metabolic Panel; Complete Time: 07:03                                 tw4 

                                                                                             

05:46 Order name: CBC with Diff; Complete Time: 07:03                                         tw4 

                                                                                             

05:46 Order name: Hepatic Function; Complete Time: 07:                                      tw4 

                                                                                             

05:46 Order name: Lipase; Complete Time: 07:03                                                tw4 

                                                                                             

06:01 Order name: Urine Microscopic Only; Complete Time: 07:03                                ea  

                                                                                             

05:46 Order name: Urine Dipstick-Ancillary (obtain specimen); Complete Time: 05:51                                                                                                         

05:46 Order name: IV Saline Lock; Complete Time: 05:51                                        tw4 

                                                                                             

05:46 Order name: Labs collected and sent; Complete Time: 05:51                                                                                                                            

05:50 Order name: Straight Cath - Urine; Complete Time: 05:50                                 ea  

                                                                                             

06:25 Order name: Urine Culture                                                               EDMS

                                                                                                  

Administered Medications:                                                                         

06:00 Drug: NS 0.9% 500 ml Route: IV; Rate: bolus; Site: right antecubital;                   ea  

07:00 Follow up: Response: No adverse reaction; IV Status: Completed infusion; IV Intake:     jl7 

      500ml                                                                                       

07:15 Drug: Rocephin 1 grams Route: IV; Rate: calculated rate; Site: right forearm;           jl7 

07:18 Follow up: Response: No adverse reaction; IV Status: Completed infusion                 jl7 

07:32 Not Given (wrong pharmacy order): Rocephin - (cefTRIAXone) 1 grams IVPB once over 30    jl7 

      mins; (mix in 50 mL NS)                                                                     

                                                                                                  

                                                                                                  

Disposition:                                                                                      

20 07:05 Discharged to Home. Impression: Cystitis, Hematuria, Urinary tract infection,      

  site not specified.                                                                             

- Condition is Stable.                                                                            

- Discharge Instructions: Constipation, Adult, Dysuria, Urinary Tract Infection, Adult,           

  Urodynamic Testing.                                                                             

- Prescriptions for Pyridium 200 mg Oral Tablet - take 1 tablet by ORAL route every 8             

  hours for 3 days; 9 tablet. Macrobid 100 mg Oral Capsule - take 1 capsule by ORAL               

  route every 12 hours for 10 days; 20 capsule. Miralax 17 gram/dose Oral - take 1                

  packet by ORAL route once daily dilute powder in 8 ounces of water or juice; 1 packet.          

- Medication Reconciliation Form, Thank You Letter, Antibiotic Education, Prescription            

  Opioid Use form.                                                                                

- Follow up: Private Physician; When: Upon discharge from the Emergency Department;               

  Reason: Recheck today's complaints, Continuance of care, Re-evaluation by your                  

  physician.                                                                                      

- Problem is new.                                                                                 

- Symptoms have improved.                                                                         

                                                                                                  

                                                                                                  

                                                                                                  

Signatures:                                                                                       

Dispatcher [employer]                           EDMS                                                 

Idania Hendricks RN                        RN   jl7                                                  

Cooney, Annalise, RN                      RN   Jesse Nj MD MD   tw4                                                  

                                                                                                  

Corrections: (The following items were deleted from the chart)                                    

06:03 05:52 URINALYSIS+U.LAB.BRZ ordered. EDMS                                                EDMS

07:53 07:05 2020 07:05 Discharged to Home. Impression: Cystitis; Hematuria; Urinary     jl7 

      tract infection, site not specified. Condition is Stable. Forms are Medication              

      Reconciliation Form, Thank You Letter, Antibiotic Education, Prescription Opioid Use.       

      Follow up: Private Physician; When: Upon discharge from the Emergency Department;           

      Reason: Recheck today's complaints, Continuance of care, Re-evaluation by your              

      physician. Problem is new. Symptoms have improved. tw4                                      

                                                                                                  

**************************************************************************************************

## 2020-12-16 VITALS — SYSTOLIC BLOOD PRESSURE: 145 MMHG | OXYGEN SATURATION: 100 % | DIASTOLIC BLOOD PRESSURE: 60 MMHG

## 2021-01-02 ENCOUNTER — HOSPITAL ENCOUNTER (INPATIENT)
Dept: HOSPITAL 97 - ER | Age: 76
LOS: 6 days | Discharge: HOME HEALTH | DRG: 246 | End: 2021-01-08
Admitting: HOSPITALIST
Payer: COMMERCIAL

## 2021-01-02 VITALS — BODY MASS INDEX: 17.5 KG/M2

## 2021-01-02 DIAGNOSIS — Z91.041: ICD-10-CM

## 2021-01-02 DIAGNOSIS — E43: ICD-10-CM

## 2021-01-02 DIAGNOSIS — Z20.822: ICD-10-CM

## 2021-01-02 DIAGNOSIS — D72.829: ICD-10-CM

## 2021-01-02 DIAGNOSIS — I25.2: ICD-10-CM

## 2021-01-02 DIAGNOSIS — Z88.8: ICD-10-CM

## 2021-01-02 DIAGNOSIS — B95.2: ICD-10-CM

## 2021-01-02 DIAGNOSIS — Z79.02: ICD-10-CM

## 2021-01-02 DIAGNOSIS — Z79.01: ICD-10-CM

## 2021-01-02 DIAGNOSIS — Z79.82: ICD-10-CM

## 2021-01-02 DIAGNOSIS — F41.9: ICD-10-CM

## 2021-01-02 DIAGNOSIS — I10: ICD-10-CM

## 2021-01-02 DIAGNOSIS — I25.110: ICD-10-CM

## 2021-01-02 DIAGNOSIS — I21.4: Primary | ICD-10-CM

## 2021-01-02 DIAGNOSIS — Z95.1: ICD-10-CM

## 2021-01-02 DIAGNOSIS — D63.8: ICD-10-CM

## 2021-01-02 DIAGNOSIS — K59.04: ICD-10-CM

## 2021-01-02 DIAGNOSIS — Z79.899: ICD-10-CM

## 2021-01-02 DIAGNOSIS — N20.0: ICD-10-CM

## 2021-01-02 DIAGNOSIS — Z88.1: ICD-10-CM

## 2021-01-02 DIAGNOSIS — N13.6: ICD-10-CM

## 2021-01-02 DIAGNOSIS — C53.9: ICD-10-CM

## 2021-01-02 DIAGNOSIS — E11.9: ICD-10-CM

## 2021-01-02 DIAGNOSIS — E87.6: ICD-10-CM

## 2021-01-02 DIAGNOSIS — T38.0X5A: ICD-10-CM

## 2021-01-02 DIAGNOSIS — Z86.718: ICD-10-CM

## 2021-01-02 LAB
ALBUMIN SERPL BCP-MCNC: 2.7 G/DL (ref 3.4–5)
ALP SERPL-CCNC: 43 U/L (ref 45–117)
ALT SERPL W P-5'-P-CCNC: 12 U/L (ref 12–78)
AST SERPL W P-5'-P-CCNC: 24 U/L (ref 15–37)
BLD SMEAR INTERP: (no result)
BUN BLD-MCNC: 20 MG/DL (ref 7–18)
BUN BLD-MCNC: 21 MG/DL (ref 7–18)
GLUCOSE SERPLBLD-MCNC: 104 MG/DL (ref 74–106)
GLUCOSE SERPLBLD-MCNC: 86 MG/DL (ref 74–106)
HCT VFR BLD CALC: 26.7 % (ref 36–45)
HCT VFR BLD CALC: 34.4 % (ref 36–45)
HDLC SERPL-MCNC: 45 MG/DL (ref 40–60)
INR BLD: 1.08
LDLC SERPL CALC-MCNC: 74 MG/DL (ref ?–130)
LYMPHOCYTES # SPEC AUTO: 0.6 K/UL (ref 0.7–4.9)
LYMPHOCYTES # SPEC AUTO: 1 K/UL (ref 0.7–4.9)
MAGNESIUM SERPL-MCNC: 2.3 MG/DL (ref 1.8–2.4)
MORPHOLOGY BLD-IMP: (no result)
NT-PROBNP SERPL-MCNC: (no result) PG/ML (ref ?–125)
PMV BLD: 8.2 FL (ref 7.6–11.3)
PMV BLD: 8.6 FL (ref 7.6–11.3)
POTASSIUM SERPL-SCNC: 2.8 MMOL/L (ref 3.5–5.1)
POTASSIUM SERPL-SCNC: 4.5 MMOL/L (ref 3.5–5.1)
RBC # BLD: 3.04 M/UL (ref 3.86–4.86)
RBC # BLD: 3.85 M/UL (ref 3.86–4.86)
TROPONIN (EMERG DEPT USE ONLY): 0.08 NG/ML (ref 0–0.04)
TROPONIN I: 0.24 NG/ML (ref 0–0.04)

## 2021-01-02 PROCEDURE — 71045 X-RAY EXAM CHEST 1 VIEW: CPT

## 2021-01-02 PROCEDURE — 93306 TTE W/DOPPLER COMPLETE: CPT

## 2021-01-02 PROCEDURE — 86870 RBC ANTIBODY IDENTIFICATION: CPT

## 2021-01-02 PROCEDURE — 85610 PROTHROMBIN TIME: CPT

## 2021-01-02 PROCEDURE — 99285 EMERGENCY DEPT VISIT HI MDM: CPT

## 2021-01-02 PROCEDURE — 87088 URINE BACTERIA CULTURE: CPT

## 2021-01-02 PROCEDURE — 84100 ASSAY OF PHOSPHORUS: CPT

## 2021-01-02 PROCEDURE — 36430 TRANSFUSION BLD/BLD COMPNT: CPT

## 2021-01-02 PROCEDURE — 87186 SC STD MICRODIL/AGAR DIL: CPT

## 2021-01-02 PROCEDURE — 80061 LIPID PANEL: CPT

## 2021-01-02 PROCEDURE — 84132 ASSAY OF SERUM POTASSIUM: CPT

## 2021-01-02 PROCEDURE — 85018 HEMOGLOBIN: CPT

## 2021-01-02 PROCEDURE — 82607 VITAMIN B-12: CPT

## 2021-01-02 PROCEDURE — 80048 BASIC METABOLIC PNL TOTAL CA: CPT

## 2021-01-02 PROCEDURE — 93971 EXTREMITY STUDY: CPT

## 2021-01-02 PROCEDURE — 74176 CT ABD & PELVIS W/O CONTRAST: CPT

## 2021-01-02 PROCEDURE — 80076 HEPATIC FUNCTION PANEL: CPT

## 2021-01-02 PROCEDURE — 36415 COLL VENOUS BLD VENIPUNCTURE: CPT

## 2021-01-02 PROCEDURE — 86922 COMPATIBILITY TEST ANTIGLOB: CPT

## 2021-01-02 PROCEDURE — 86850 RBC ANTIBODY SCREEN: CPT

## 2021-01-02 PROCEDURE — 82274 ASSAY TEST FOR BLOOD FECAL: CPT

## 2021-01-02 PROCEDURE — 97161 PT EVAL LOW COMPLEX 20 MIN: CPT

## 2021-01-02 PROCEDURE — 85049 AUTOMATED PLATELET COUNT: CPT

## 2021-01-02 PROCEDURE — 83880 ASSAY OF NATRIURETIC PEPTIDE: CPT

## 2021-01-02 PROCEDURE — 84466 ASSAY OF TRANSFERRIN: CPT

## 2021-01-02 PROCEDURE — 81001 URINALYSIS AUTO W/SCOPE: CPT

## 2021-01-02 PROCEDURE — 84484 ASSAY OF TROPONIN QUANT: CPT

## 2021-01-02 PROCEDURE — 85347 COAGULATION TIME ACTIVATED: CPT

## 2021-01-02 PROCEDURE — 84145 PROCALCITONIN (PCT): CPT

## 2021-01-02 PROCEDURE — 76377 3D RENDER W/INTRP POSTPROCES: CPT

## 2021-01-02 PROCEDURE — 83735 ASSAY OF MAGNESIUM: CPT

## 2021-01-02 PROCEDURE — 87086 URINE CULTURE/COLONY COUNT: CPT

## 2021-01-02 PROCEDURE — 93455 CORONARY ART/GRFT ANGIO S&I: CPT

## 2021-01-02 PROCEDURE — 81015 MICROSCOPIC EXAM OF URINE: CPT

## 2021-01-02 PROCEDURE — 87077 CULTURE AEROBIC IDENTIFY: CPT

## 2021-01-02 PROCEDURE — 93005 ELECTROCARDIOGRAM TRACING: CPT

## 2021-01-02 PROCEDURE — 84443 ASSAY THYROID STIM HORMONE: CPT

## 2021-01-02 PROCEDURE — 82728 ASSAY OF FERRITIN: CPT

## 2021-01-02 PROCEDURE — 81003 URINALYSIS AUTO W/O SCOPE: CPT

## 2021-01-02 PROCEDURE — 97530 THERAPEUTIC ACTIVITIES: CPT

## 2021-01-02 PROCEDURE — 86901 BLOOD TYPING SEROLOGIC RH(D): CPT

## 2021-01-02 PROCEDURE — 97116 GAIT TRAINING THERAPY: CPT

## 2021-01-02 PROCEDURE — 83540 ASSAY OF IRON: CPT

## 2021-01-02 PROCEDURE — 85730 THROMBOPLASTIN TIME PARTIAL: CPT

## 2021-01-02 PROCEDURE — 85014 HEMATOCRIT: CPT

## 2021-01-02 PROCEDURE — 86900 BLOOD TYPING SEROLOGIC ABO: CPT

## 2021-01-02 PROCEDURE — 84439 ASSAY OF FREE THYROXINE: CPT

## 2021-01-02 PROCEDURE — 85025 COMPLETE CBC W/AUTO DIFF WBC: CPT

## 2021-01-02 RX ADMIN — HEPARIN SODIUM SCH UNIT: 5000 INJECTION, SOLUTION INTRAVENOUS; SUBCUTANEOUS at 22:00

## 2021-01-02 RX ADMIN — Medication SCH ML: at 21:25

## 2021-01-02 NOTE — CON
Date of Consultation:  01/02/2021



History Of Present Illness:  Non-ST-elevation myocardial infarction.



History Of Present Illness:  Ms. Hughes is a 75-year-old woman, has had a CABG in 1979.  Does not ha
ve a physician right now because of lack of insurance.  She has a history of diabetes and hypertensio
n; came in with substernal chest pain, going on intermittently for 2 weeks; ST depression.  Troponin 
was positive at 0.08.  Her BNP was 10,588.  Her potassium was 2.8.  Her white count was 12,000.  EKG 
showed ST depression diffusely.  Chest x-ray was negative.  Venous Doppler was negative.



Social History:  Positive for pedal edema on the right side.



Social History:  Negative.



Family History:  Negative.



Allergies:  SHE IS ALLERGIC TO SULFUR AND MAYBE IODINE, SHE IS NOT SURE.



Physical Examination:

Vital Signs:  Stable.  She was afebrile. 

HEENT:  Negative. 

Neck:  Supple.  No bruit, lymphadenopathy, JVD, or thyromegaly. 

Chest:  Clear to auscultation and percussion. 

Cardiac:  Regular rhythm and rate with S4 gallops and an aortic sclerosis murmur.  No rubs. 

Abdomen:  Benign. 

Extremities:  No clubbing or cyanosis.  She had trace edema in the right leg.



Diagnostic Data:  As stated earlier.



Impression And Plan:  The patient with coronary artery disease, status post coronary artery bypass gr
aft in 1979, ST depression, positive troponin.  We will plan a heart catheterization on Monday elinoroneal
stacie.  For now, I would keep her on heparin, Plavix, aspirin, and metoprolol.  She may have iodine aller
gy and I will pretreat her for that the night before the catheterization and the morning of and durin
g the procedure with prednisone and Solu-Medrol.  Her diabetes is well controlled and her blood press
ure is well controlled.  She needs her potassium supplemented.  Case was discussed with Dr. Stas Crooks.





NB/MODL

DD:  01/02/2021 16:04:57Voice ID:  [id number]

DT:  01/02/2021 19:57:03Report ID:  [id number]

## 2021-01-02 NOTE — XMS REPORT
Continuity of Care Document

                           Created on:2021



Patient:FANTA MCCABE

Sex:Female

:1945

External Reference #:[id number]





Demographics







                          Address                   [address] [address]

 

                          Home Phone                [phone]

 

                          Work Phone                [phone]

 

                          Mobile Phone              [phone]

 

                          Email Address             [address]

 

                          Preferred Language        English

 

                          Marital Status            Unknown

 

                          Sikhism Affiliation     Unknown

 

                          Race                      Unknown

 

                          Additional Race(s)        Unavailable



                                                    Unavailable



                                                    White

 

                          Ethnic Group              Unknown









Author







                          Organization              [facility]

t

 

                          Address                   [address]



                                                    [place] [address]

 

                          Phone                     [phone]









Support







                Name            Relationship    Address         Phone

 

                Thanh (DAUGHTER) ESTELLE Child           [phone]





                                                [place] [address] 

 

                Luiz          Mother          Unavailable     [phone]

 

                Martinez (Brother)   Sibling         Unavailable     [phone]









Care Team Providers







                    Name                Role                Phone

 

                    CHRISTINA ALMAGUER         Primary Care Physician Unavailable

 

                    SYSTEM,  NOT IN     Attending Clinician Unavailable

 

                    Doctor Unassigned,  Name Attending Clinician Unavailable

 

                    Jose BAH       Attending Clinician [phone]

 

                    Dontae RN,  A       Attending Clinician Unavailable









Problems

This patient has no known problems.



Allergies, Adverse Reactions, Alerts

This patient has no known allergies or adverse reactions.



Medications

This patient has no known medications.



Procedures

This patient has no known procedures.



Encounters







        Start   End     Encounter Admission Attending Care    Care    Encounter 

Source



        Date/Time Date/Time Type    Type    Clinicians Facility Department ID   

   

 

        2020-10-07         Outpatient         SYSTEM, [facility]     [id number]

 MD



        10:40:33                         PROVIDER                         Carlos desir

 

        2020-10-23 2020-10-23 Orders          Doctor OATES    [id number]

26 



        00:00:00 00:00:00 Only            UnassFANG neville   350.1.13.10       

  



                                        [facility] [id number]         



                                                        [phone]         



                                                        009             

 

        2020-10-09 2020-10-09 Letter          Jody Garcia  [id number]

[id number] 



        00:00:00 00:00:00 (Out)           Josh Alvarez   [id number]         



                                                [facility] [id number]         



                                                        [phone]         



                                                        091             

 

        2020-10-09 2020-10-09 Orders          Doctor  ИВАН    [id number]

34 



        00:00:00 00:00:00 Only            Unassigned, FANG   350.1.13.10       

  



                                        [facility] [id number]         



                                                        [phone]         



                                                        009             

 

        2020-10-06 2020-10-06 Telephone         JACLYN Garcia 1.2.840.11

[id number] 



        00:00:00 00:00:00                 JoshINTTRA [id number]         



                                                [facility] [id number]         



                                                        [phone]         



                                                        096             

 

        2020-10-02 2020-10-02 Transition         Hyacinth Diggskarlee  1.2.840.114 [id number]

[id number] 



        00:00:00 00:00:00 of Care         Didier Kauffman   350.1.13.10         



                                                [place]   [id number]         



                                                        [phone]         



                                                        403             

 

        2020-10-02 2020-10-02 Telephone         JACLYN Garcia 1.2.840.11

[id number] 



        00:00:00 00:00:00                 JoshINTTRA 350.1.13.10         



                                                [facility] [id number]         



                                                        [phone]         



                                                        096             







Results

This patient has no known results.

## 2021-01-02 NOTE — RAD REPORT
EXAM DESCRIPTION:  Kathleen Single View1/2/2021 10:46 am

 

CLINICAL HISTORY:  Chest pain

 

COMPARISON:  September 2020

 

FINDINGS:   The lungs appear clear of acute infiltrate. The heart is normal size. Postsurgical change
s involve the chest

 

IMPRESSION:   No acute abnormalities displayed

## 2021-01-02 NOTE — EDPHYS
Physician Documentation                                                                           

 [facility]                                                                 

Name: Va Hughes                                                                              

Age: 75 yrs                                                                                       

Sex: Female                                                                                       

: 1945                                                                                   

MRN: [id number]                                                                                   

Arrival Date: 2021                                                                          

Time: 10:02                                                                                       

Account#: [id number]                                                                            

Bed 3                                                                                             

Private MD:                                                                                       

ED Physician Quan Crooks                                                                      

HPI:                                                                                              

                                                                                             

10:09 This 75 yrs old  Female presents to ER via EMS with complaints of Chest Pain > pm1 

      32 y/o.                                                                                     

10:09 The patient or guardian reports chest pain that is located primarily in the mid-sternal pm1 

      area. Onset: this morning, at 04:00. The pain radiates to back. Associated signs and        

      symptoms: Pertinent positives: SOB with exertion, constipation last BM 3-4 days ago,        

      Pertinent negatives: abdominal pain, cough.                                                 

10:09 The chest pain is described as a pressure, sharp. Duration: The patient or guardian     pm1 

      reports a single episode, that is still ongoing. Modifying factors: The symptoms are        

      alleviated by nothing. the symptoms are aggravated by nothing. Severity of pain: in the     

      emergency department the pain has improved is a 10 / 10. EMS care prior to arrival          

      includes: None. The patient has not recently seen a physician, Patient recently taken       

      off eliquis about 2 months ago due to tachycardia side effect. Patient was then started     

      on aspirin. Patient's reports home medications are metoprolol and aspirin only. Patient     

      with double bypass in .                                                                 

10:09 Right lower leg swelling for the past 3-4 weeks. Nonpainful. History of DVT.            pm1 

                                                                                                  

Historical:                                                                                       

- Allergies:                                                                                      

10:06 Sulfa (Sulfonamide Antibiotics);                                                        em  

13:55 Iodine;                                                                                 em  

- Home Meds:                                                                                      

10:06 Metoprolol Tartrate Oral [Active]; Aspirin Oral [Active];                               em  

- PMHx:                                                                                           

10:06 Diabetes - NIDDM; Hypertension; Myocardial infarction; DVT;                             em  

- PSHx:                                                                                           

10:06 CABG;                                                                                   em  

                                                                                                  

- Immunization history:: Adult Immunizations up to date.                                          

- Social history:: Smoking status: Patient denies any tobacco usage or history of.                

                                                                                                  

                                                                                                  

ROS:                                                                                              

10:09 Constitutional: Negative for fever, chills, and weight loss.                            pm1 

10:09 Cardiovascular: Positive for chest pain, edema to right lower extremity, Negative for       

      edema, palpitations.                                                                        

10:09 Back: Negative for injury and pain.                                                     pm1 

10:09 MS/Extremity: Negative for injury and deformity, Skin: Negative for injury, rash, and       

      discoloration, Neuro: Negative for headache, weakness, numbness, tingling, and seizure.     

10:09 Respiratory: Positive for shortness of breath, on exertion. Negative for cough.             

10:09 Abdomen/GI: Positive for constipation, Negative for abdominal pain, nausea, vomiting,       

      and diarrhea.                                                                               

                                                                                                  

Exam:                                                                                             

10:09 Constitutional:  This is a well developed, well nourished patient who is awake, alert,  pm1 

      and in no acute distress. Head/Face:  Normocephalic, atraumatic. Chest/axilla:  Normal      

      chest wall appearance and motion.  Nontender with no deformity.  No lesions are             

      appreciated.                                                                                

10:09 Respiratory:  Lungs have equal breath sounds bilaterally, clear to auscultation and         

      percussion.  No rales, rhonchi or wheezes noted.  No increased work of breathing, no        

      retractions or nasal flaring.                                                               

10:09 Back:  No spinal tenderness.  No costovertebral tenderness.  Full range of motion.          

      Skin:  Warm, dry with normal turgor.  Normal color with no rashes, no lesions, and no       

      evidence of cellulitis.                                                                     

10:09 Cardiovascular: Exam negative for  acute changes, Rate: normal, Rhythm: regular,            

      Pulses: no pulse deficits are appreciated, Heart sounds: murmur, not appreciated.           

10:09 Abdomen/GI: Inspection: abdomen appears normal, Palpation: abdomen is soft and              

      non-tender, in all quadrants.                                                               

10:09 Musculoskeletal/extremity: ROM: intact in all extremities, DVT Exam: swelling, of the       

      left leg.                                                                                   

                                                                                                  

Vital Signs:                                                                                      

10:03  / 72; Pulse 70; Resp 18; Temp 98.8; Pulse Ox 99% on R/A; Pain 10/10;             em  

10:50 Weight 44.4 kg (M);                                                                     ss  

10:51  / 68; Pulse 52; Resp 18; Pulse Ox 99% on R/A;                                    em  

11:54  / 74; Pulse 51; Resp 18; Pulse Ox 98% on R/A;                                    em  

12:57  / 62; Pulse 55; Resp 14; Pulse Ox 100% on R/A; Pain 0/10;                        em  

13:49  / 81; Pulse 80; Resp 18; Pulse Ox 100% on R/A;                                   em  

14:30  / 77; Pulse 59; Resp 20; Pulse Ox 100% on R/A;                                   em  

15:30  / 87; Pulse 65; Resp 18; Pulse Ox 98% on 2 lpm NC;                               em  

16:23  / 71; Pulse 58; Resp 16; Pulse Ox 99% on R/A; Pain 0/10;                         em  

17:30  / 80; Pulse 57; Resp 21; Pulse Ox 100% ;                                         sv  

18:30  / 62; Pulse 65; Resp 19; Pulse Ox 100% ;                                         sv  

                                                                                                  

MDM:                                                                                              

10:05 Patient medically screened.                                                             pm1 

10:44 ED course: Chest pain currently 0-1/10.                                                 pm1 

11:05 Physician consultation: Eloy Wyman MD was called at 11:01, was contacted at 11:05,   pm1 

      regarding admission, patient's condition, would like admission per Dr. Hollis Wyman plans to cath on Monday. Continue metoprolol, heparin.                              

11:20 Data reviewed: vital signs.                                                             pm1 

12:14 Refusal of service: The patient/guardian displays adequate decision making capability   pm1 

      and despite a detailed discussion of alternatives, benefits, risks, and consequences        

      refuses: CT Scan, patient reports that prior CT scan with IV contrast caused her to         

      have heart attack like symptoms.                                                            

13:05 Physician consultation: Hollis Mcclelland regarding admission, patient's condition, in the  pm1 

      emergency department to see patient at 13:05.                                               

14:28 Physician consultation: Eloy Wyman MD in the emergency department to see patient at  pm1 

      14:29, wants heparin continued versus lovenox.                                              

                                                                                                  

                                                                                             

10:05 Order name: Basic Metabolic Panel                                                       pm1 

                                                                                             

10:05 Order name: CBC with Diff; Complete Time: 10:47                                         pm1 

                                                                                             

10:05 Order name: LFT's; Complete Time: 11:44                                                 pm1 

                                                                                             

10:05 Order name: Magnesium; Complete Time: 11:44                                             pm1 

                                                                                             

10:05 Order name: NT PRO-BNP; Complete Time: 11:44                                            pm1 

                                                                                             

10:05 Order name: PT-INR; Complete Time: 11:44                                                pm1 

                                                                                             

10:05 Order name: Troponin (emerg Dept Use Only); Complete Time: 11:44                        pm1 

                                                                                             

10:06 Order name: Basic Metabolic Panel; Complete Time: 11:44                                 EDMS

                                                                                             

13:57 Order name: COVID-19                                                                    em  

                                                                                             

14:55 Order name: CORONAVIRUS                                                                 EDMS

                                                                                             

15:41 Order name: Chem 7                                                                      yuniel 

                                                                                             

15:44 Order name: Phosphorus                                                                  yuniel 

                                                                                             

17:29 Order name: SARS-COV-2 RT PCR; Complete Time: 17:38                                     EDMS

                                                                                             

17:40 Order name: Basic Metabolic Panel; Complete Time: 17:41                                 EDMS

                                                                                             

10:05 Order name: XRAY Chest (1 view); Complete Time: 13:48                                   pm1 

                                                                                             

10:07 Order name: Extremity Venous Uni Ltd US; Complete Time: 13:05                           pm1 

                                                                                             

17:40 Order name: Phosphorus; Complete Time: 17:41                                            EDMS

                                                                                             

17:57 Order name: Troponin I; Complete Time: 18:09                                            EDMS

                                                                                             

17:57 Order name: Lipid Profile; Complete Time: 18:09                                         EDMS

                                                                                             

20:04 Order name: PTT, Activated Partial Thromb; Complete Time: 20:19                         EDMS

                                                                                             

20:04 Order name: CBC with Automated Diff                                                     EDMS

                                                                                             

10:05 Order name: EKG; Complete Time: 10:06                                                   pm1 

                                                                                             

10:05 Order name: Cardiac monitoring; Complete Time: 10:22                                    pm1 

                                                                                             

10:05 Order name: EKG - Nurse/Tech; Complete Time: 10:22                                      pm                                                                                             

10:05 Order name: IV Saline Lock; Complete Time: 10:22                                        pm1 

                                                                                             

10:05 Order name: Labs collected and sent; Complete Time: 10:22                               pm1 

                                                                                             

10:05 Order name: O2 Per Protocol; Complete Time: 10:49                                       pm                                                                                             

10:05 Order name: O2 Sat Monitoring; Complete Time: 10:49                                     pm1 

                                                                                             

10:44 Order name: EKG; Complete Time: 10:45                                                   pm1 

                                                                                             

10:44 Order name: EKG - Nurse/Tech; Complete Time: 10:58                                      pm1 

                                                                                                  

Administered Medications:                                                                         

10:28 Drug: Aspirin Chewable Tablet 324 mg Route: PO;                                         em  

11:40 Follow up: Response: No adverse reaction                                                em  

10:30 Drug: Zofran (Ondansetron) 4 mg Route: IVP; Site: right antecubital;                    em  

11:00 Follow up: Response: No adverse reaction                                                em  

11:05 Drug: Heparin (MI-Bolus No thrombolytic) - HEParin 60 units/kg {Co-Signature: ss        em  

      (Carolyn Hernandez RN).} Route: IVP; Site: left forearm;                                        

12:57 Follow up: Response: No adverse reaction                                                em  

11:06 Drug: Heparin (MI Drip) 12 units/kg/hr - (HEParin 33690 units, D5W 500 ml)              em  

      {Co-Signature: ss (Carolyn Hernandez RN).} Route: IV; Rate: calculated rate; Site: left         

      forearm;                                                                                    

11:08 Drug: PlaVIX 300 mg Route: PO;                                                          em  

11:41 Follow up: Response: No adverse reaction                                                em  

12:56 Drug: Potassium Chloride 20 mEq Route: IV; Rate: calculated rate; Site: left forearm;   em  

12:56 Drug: Potassium Effervescent Tablet 50 mEq Route: PO;                                   em  

13:54 Follow up: Response: No adverse reaction                                                em  

14:37 Drug: Pepcid 20 mg Route: IVP; Site: left forearm;                                      em  

14:55 Follow up: Response: No adverse reaction                                                em  

14:38 Not Given (Physician Discretion): Metoprolol 5 mg IVP once; Hold for SBP <100 or HR <60.em  

16:16 Drug: Lopressor 12.5 mg Route: PO;                                                      em  

16:16 Drug: Potassium Effervescent Tablet 50 mEq Route: PO;                                   em  

17:48 Follow up: Response: No adverse reaction                                                em  

19:11 Not Given (Other Intervention Used): morphine 4 mg IVP once; RASS on ADMIN: Combtv4,    em  

      Very Agttd3, Agttd2, Rstlss1, AlertClm0, Drwsy-1, Lt Sdtn-2, Mod Sdtn-3, Dp Sdtn-4,         

      UnArsble-5                                                                                  

                                                                                                  

                                                                                                  

Disposition:                                                                                      

                                                                                             

07:30 Co-signature as Attending Physician, Quan Crooks MD I agree with the assessment and  yuniel 

      plan of care.                                                                               

                                                                                                  

Disposition:                                                                                      

21 11:21 Hospitalization ordered by Hollis Mcclelland for Inpatient Admission. Preliminary     

  diagnosis are Non-ST elevation (NSTEMI) myocardial infarction, Hypokalemia.                     

- Bed requested for [facility] ER HOLD.                                                                 

- Status is Inpatient Admission.                                                              lp1 

- Condition is Stable.                                                                            

- Problem is new.                                                                                 

- Symptoms have improved.                                                                         

                                                                                                  

                                                                                                  

                                                                                                  

Signatures:                                                                                       

Dispatcher [employer]                           [facility]                                                 

Quan Crooks MD MD cha Munoz, Edgar, RN                        GRACY                                                      

Carolyn Hernandez RN RN   ss                                                   

Kelley Desai, GRACY                         RN   lp1                                                  

Clinton Alcala, TIARA                    NP   pm1                                                  

Carolyn Hernandez RN                             ss                                                   

                                                                                                  

Corrections: (The following items were deleted from the chart)                                    

                                                                                             

10:17 10:08 Chest For PE Angio+CT.RAD.BRZ ordered. [facility]                                       EDMS

12:29 11:21 Hospitalization Ordered by Hollis Mcclelland for Inpatient Admission. Preliminary     pm1 

      diagnosis is Chest pain, unspecified. Bed requested for Intensive Care Unit. Status is      

      Inpatient Admission. Condition is Stable. Problem is new. Symptoms have improved. pm1       

13:05 10:12 Angio Aorta For Dissection+CT.RAD.BRZ ordered. [facility]

14:41 12:29 2021 11:21 Hospitalization Ordered by Hollis Mcclelland for Inpatient           pm1 

      Admission. Preliminary diagnosis is Chest pain, unspecified; Hypokalemia. Bed requested     

      for Intensive Care Unit. Status is Inpatient Admission. Condition is Stable. Problem is     

      new. Symptoms have improved. pm1                                                            

16:57 14:41 2021 11:21 Hospitalization Ordered by Hollis Mcclelland for Inpatient           ss  

      Admission. Preliminary diagnosis is Non-ST elevation (NSTEMI) myocardial infarction;        

      Hypokalemia. Bed requested for Intensive Care Unit. Status is Inpatient Admission.          

      Condition is Stable. Problem is new. Symptoms have improved. pm1                            

20:20 16:57 2021 11:21 Hospitalization Ordered by Hollis Mcclelland for Inpatient           lp1 

      Admission. Preliminary diagnosis is Non-ST elevation (NSTEMI) myocardial                    

      infarctionHypokalemia. Bed requested for [facility] ER HOLD. Status is Inpatient Admission.       

      Condition is Stable. Problem is new. Symptoms have improved.                              

20:22 10:09 Cardiovascular: Positive for chest pain, Negative for edema, palpitations, pm1    pm1 

                                                                                                  

**************************************************************************************************

## 2021-01-02 NOTE — ER
Nurse's Notes                                                                                     

 [facility]                                                                 

Name: Va Hughes                                                                              

Age: 75 yrs                                                                                       

Sex: Female                                                                                       

: 1945                                                                                   

MRN: [id number]                                                                                   

Arrival Date: 2021                                                                          

Time: 10:02                                                                                       

Account#: [id number]                                                                            

Bed 3                                                                                             

Private MD:                                                                                       

Diagnosis: Hypokalemia;Non-ST elevation (NSTEMI) myocardial infarction                            

                                                                                                  

Presentation:                                                                                     

                                                                                             

10:03 Chief complaint: EMS states: called out for chest pain since 0500 this morning, pt      em  

      reports shortness of breath on exertion, also reports hx of blood clots in legs, used       

      to take eliquis but made heart rate go up. Coronavirus screen: Client denies travel out     

      of the [place] in the last 14 days. Ebola Screen: Patient negative for fever greater than      

      or equal to 101.5 degrees Fahrenheit, and additional compatible Ebola Virus Disease         

      symptoms Patient denies exposure to infectious person. Patient denies travel to an          

      Ebola-affected area in the 21 days before illness onset. No symptoms or risks               

      identified at this time. Initial Sepsis Screen: Does the patient meet any 2 criteria?       

      No. Patient's initial sepsis screen is negative. Does the patient have a suspected          

      source of infection? No. Patient's initial sepsis screen is negative. Risk Assessment:      

      Do you want to hurt yourself or someone else? Patient reports no desire to harm self or     

      others. Onset of symptoms was 2021.                                             

10:03 Method Of Arrival: EMS: [facility]                                                    em  

10:03 Acuity: EDMOND 3                                                                           em  

                                                                                                  

Historical:                                                                                       

- Allergies:                                                                                      

10:06 Sulfa (Sulfonamide Antibiotics);                                                        em  

13:55 Iodine;                                                                                 em  

- Home Meds:                                                                                      

10:06 Metoprolol Tartrate Oral [Active]; Aspirin Oral [Active];                               em  

- PMHx:                                                                                           

10:06 Diabetes - NIDDM; Hypertension; Myocardial infarction; DVT;                             em  

- PSHx:                                                                                           

10:06 CABG;                                                                                   em  

                                                                                                  

- Immunization history:: Adult Immunizations up to date.                                          

- Social history:: Smoking status: Patient denies any tobacco usage or history of.                

                                                                                                  

                                                                                                  

Screening:                                                                                        

10:03 Abuse screen: Denies threats or abuse. Nutritional screening: No deficits noted.        em  

      Tuberculosis screening: No symptoms or risk factors identified. Fall Risk None              

      identified.                                                                                 

                                                                                                  

Assessment:                                                                                       

10:03 General: Appears in no apparent distress. uncomfortable, Behavior is calm, cooperative, em  

      appropriate for age. Pain: Complains of pain in chest Pain does not radiate. Pain began     

      0500. Neuro: Level of Consciousness is awake, alert, obeys commands, Oriented to            

      person, place, time, situation, Appropriate for age. Cardiovascular: Capillary refill <     

      3 seconds Patient's skin is warm and dry. Respiratory: Reports shortness of breath          

      Airway is patent Respiratory effort is even, unlabored, Respiratory pattern is regular,     

      symmetrical. Derm: Skin is intact, is healthy with good turgor, Skin is pink, warm \T\      

      dry. Musculoskeletal: Capillary refill < 3 seconds, Range of motion: intact in all          

      extremities.                                                                                

11:00 Reassessment: Patient appears in no apparent distress at this time. Patient and/or      em  

      family updated on plan of care and expected duration. Pain level reassessed. Patient is     

      alert, oriented x 3, equal unlabored respirations, skin warm/dry/pink.                      

12:00 Reassessment: Patient appears in no apparent distress at this time. Patient and/or      em  

      family updated on plan of care and expected duration. Pain level reassessed. Patient is     

      alert, oriented x 3, equal unlabored respirations, skin warm/dry/pink. Patient denies       

      pain at this time.                                                                          

12:57 Reassessment: Dr. Mcclelland at bedside.                                                    em  

13:50 Reassessment: Patient appears in no apparent distress at this time. Patient and/or      em  

      family updated on plan of care and expected duration. Pain level reassessed. Patient is     

      alert, oriented x 3, equal unlabored respirations, skin warm/dry/pink.                      

14:35 Reassessment: Dr. Ray at bedside, received VO for 12.5 mg PO Metoprolol BID.        em  

15:30 Reassessment: Patient appears in no apparent distress at this time. Patient and/or      em  

      family updated on plan of care and expected duration. Pain level reassessed. Patient is     

      alert, oriented x 3, equal unlabored respirations, skin warm/dry/pink.                      

16:30 Reassessment: Patient appears in no apparent distress at this time. Patient and/or      em  

      family updated on plan of care and expected duration. Pain level reassessed. Patient is     

      alert, oriented x 3, equal unlabored respirations, skin warm/dry/pink.                      

                                                                                                  

Vital Signs:                                                                                      

10:03  / 72; Pulse 70; Resp 18; Temp 98.8; Pulse Ox 99% on R/A; Pain 10/10;             em  

10:50 Weight 44.4 kg (M);                                                                     ss  

10:51  / 68; Pulse 52; Resp 18; Pulse Ox 99% on R/A;                                    em  

11:54  / 74; Pulse 51; Resp 18; Pulse Ox 98% on R/A;                                    em  

12:57  / 62; Pulse 55; Resp 14; Pulse Ox 100% on R/A; Pain 0/10;                        em  

13:49  / 81; Pulse 80; Resp 18; Pulse Ox 100% on R/A;                                   em  

14:30  / 77; Pulse 59; Resp 20; Pulse Ox 100% on R/A;                                   em  

15:30  / 87; Pulse 65; Resp 18; Pulse Ox 98% on 2 lpm NC;                               em  

16:23  / 71; Pulse 58; Resp 16; Pulse Ox 99% on R/A; Pain 0/10;                         em  

17:30  / 80; Pulse 57; Resp 21; Pulse Ox 100% ;                                         sv  

18:30  / 62; Pulse 65; Resp 19; Pulse Ox 100% ;                                         sv  

                                                                                                  

ED Course:                                                                                        

10:02 Patient arrived in ED.                                                                  em  

10:03 Patient maintains SpO2 saturation greater than 95% on room air.                         em  

10:04 Triage completed.                                                                       em  

10:05 Clinton Alcala NP is PHCP.                                                           pm1 

10:05 Quan Crooks MD is Attending Physician.                                             pm1 

10:06 Arm band placed on.                                                                     em  

10:07 Ottoniel Carrera, RN is Primary Nurse.                                                      em  

10:20 Initial lab(s) drawn, by me, by EMS personnel. Inserted saline lock: 22 gauge in right  mh5 

      antecubital area, using aseptic technique. Blood collected.                                 

10:21 Patient has correct armband on for positive identification. Placed in gown. Bed in low  mh5 

      position. Call light in reach. Side rails up X2. Warm blanket given. Cardiac monitor        

      on. Pulse ox on. NIBP on.                                                                   

10:21 EKG done, by ED staff, reviewed by Clinton Alcala NP.                                  5 

10:22 Basic Metabolic Panel Sent.                                                             5 

10:22 CBC with Diff Sent.                                                                     5 

10:23 LFT's Sent.                                                                             5 

10:23 Magnesium Sent.                                                                         5 

10:23 NT PRO-BNP Sent.                                                                        mh5 

10:23 PT-INR Sent.                                                                            mh5 

10:23 Troponin (emerg Dept Use Only) Sent.                                                    mh5 

10:46 XRAY Chest (1 view) In Process Unspecified.                                             EDMS

10:49 Inserted saline lock: 22 gauge in left forearm, using aseptic technique. Blood          ss  

      collected.                                                                                  

11:21 Hollis Mcclelland is Hospitalizing Provider.                                               pm1 

12:41 Extremity Venous Uni Ltd US In Process Unspecified.                                     EDMS

14:13 COVID-19 Sent.                                                                          mh5 

14:14 COVID swab sent to lab.                                                                 mh5 

15:30 No provider procedures requiring assistance completed. Patient admitted, IV remains in  em  

      place.                                                                                      

                                                                                                  

Administered Medications:                                                                         

10:28 Drug: Aspirin Chewable Tablet 324 mg Route: PO;                                         em  

11:40 Follow up: Response: No adverse reaction                                                em  

10:30 Drug: Zofran (Ondansetron) 4 mg Route: IVP; Site: right antecubital;                    em  

11:00 Follow up: Response: No adverse reaction                                                em  

11:05 Drug: Heparin (MI-Bolus No thrombolytic) - HEParin 60 units/kg {Co-Signature: ss        em  

      (Carolyn Hernandez RN).} Route: IVP; Site: left forearm;                                        

12:57 Follow up: Response: No adverse reaction                                                em  

11:06 Drug: Heparin (MI Drip) 12 units/kg/hr - (HEParin 50443 units, D5W 500 ml)              em  

      {Co-Signature: ss (Carolyn Hernandez RN).} Route: IV; Rate: calculated rate; Site: left         

      forearm;                                                                                    

11:08 Drug: PlaVIX 300 mg Route: PO;                                                          em  

11:41 Follow up: Response: No adverse reaction                                                em  

12:56 Drug: Potassium Chloride 20 mEq Route: IV; Rate: calculated rate; Site: left forearm;   em  

12:56 Drug: Potassium Effervescent Tablet 50 mEq Route: PO;                                   em  

13:54 Follow up: Response: No adverse reaction                                                em  

14:37 Drug: Pepcid 20 mg Route: IVP; Site: left forearm;                                      em  

14:55 Follow up: Response: No adverse reaction                                                em  

14:38 Not Given (Physician Discretion): Metoprolol 5 mg IVP once; Hold for SBP <100 or HR <60.em  

16:16 Drug: Lopressor 12.5 mg Route: PO;                                                      em  

16:16 Drug: Potassium Effervescent Tablet 50 mEq Route: PO;                                   em  

17:48 Follow up: Response: No adverse reaction                                                em  

19:11 Not Given (Other Intervention Used): morphine 4 mg IVP once; RASS on ADMIN: Combtv4,    em  

      Very Agttd3, Agttd2, Rstlss1, AlertClm0, Drwsy-1, Lt Sdtn-2, Mod Sdtn-3, Dp Sdtn-4,         

      UnArsble-5                                                                                  

                                                                                                  

                                                                                                  

Outcome:                                                                                          

11:21 Decision to Hospitalize by Provider.                                                    pm1 

15:30 Admitted to ER Hold.  Please see [facility] for further documentation.                    em  

15:30 Condition: improved                                                                         

15:30 Instructed on the need for admit, Demonstrated understanding of instructions.               

20:20 Patient left the ED.                                                                    lp1 

                                                                                                  

Signatures:                                                                                       

Dispatcher [employer]                           Lynn Ruiz, GRACY DUBOSE                                                      

Ottoniel Carrera RN RN em Smirch, Shelby, RN RN ss Pena, Laura, RN RN   lp1                                                  

Clinton Alcala, NP                    NP   pm1                                                  

Erin Paredes                              [facility]                                                  

Carolyn Hernandez RN                                                                                

                                                                                                  

**************************************************************************************************

## 2021-01-02 NOTE — P.HP
Certification for Inpatient


Patient admitted to: Inpatient


With expected LOS: >2 Midnights


Practitioner: I am a practitioner with admitting privileges, knowledge of 

patient current condition, hospital course, and medical plan of care.


Services: Services provided to patient in accordance with Admission requirements

found in Title 42 Section 412.3 of the Code of Federal Regulations





Patient History


Date of Service: 01/02/21


Reason for admission: Chest pain


History of Present Illness: 


75-year-old woman with a history of cervical cancer, history of DVT, history of 

coronary artery disease status post CABG presented to the emergency department 

with a complaint of chest pain.  Patient reports experiencing intermittent chest

pain of several months duration.  Reports severe intermittent episodes of chest 

pain of onset last night.  Chest pain worse with exertion and straining at bowel

movement.  Patient had been on Eliquis for DVT which per patient was 

discontinued because she was getting tachycardia from it.  She is currently not 

taking any anticoagulation.  Initial EKG done in the emergency department 

demonstrated ST depression in V4, V5 and lead II.  She was given aspirin, 

loading dose of Plavix, IV morphine and started on heparin drip.  Repeat EKG 

demonstrated sinus bradycardia with ST depression no longer present.  She also 

has hypokalemia.  Venous Doppler of the lower extremities was negative for DVT 

though her right leg is swollen compared to the left.  Dr. Wyman-cardiologist 

was contacted by the ED provider who recommended hospitalization for full 

anticoagulation, aspirin, beta-blocker and cardiac catheterization on Monday.  

Patient is hospitalized for further management of unstable angina.





Allergies





Sulfa (Sulfonamide Antibiotics) [Sulfa(Sulfonamide Antibiotics)] Adverse 

Reaction (Verified 12/09/12 14:14)


   anemia





Home Medications: 








Aspirin [Aspirin EC 81 MG] 1 tab PO DAILY 06/25/14 


Metoprolol Tartrate [Lopressor*] 1 tab PO BID 06/25/14 


Omeprazole [Prilosec] 40 mg PO PRN PRN 06/25/14 


metroNIDAZOLE [Flagyl*] 500 mg PO Q8H 7 Days  tablet 06/25/14 








- Past Medical/Surgical History


Diabetic: No


-: HTN


-: CAD with history of MI


-: MI


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


-: Cervical Cancer, MD Crooks


-: CABG


-: TONSILLECTOMY


-: CATARACTS BL SX


-: D&C X2


-: BROKEN  NOSE REPAIR


Psychosocial/ Personal History: .





- Family History


  ** Mother


-: Heart disease





  ** Brother


-: Stroke





- Social History


Alcohol use: No


CD- Drugs: No


Caffeine use: No





Review of Systems


Other: 


Except as documented, all other systems reviewed and negative.








Physical Examination





- Physical Exam


General: Alert, In no apparent distress, Oriented x3


HEENT: Mucous membr. moist/pink, EOMI, Sclerae nonicteric


Neck: Supple, JVD not distended


Respiratory: Clear to auscultation bilaterally, Normal air movement


Cardiovascular: Edema (Right lower extremity), Irregular heart rate/rhythm, 

Abnormal S1 S2 (Split 2nd sound), Systolic murmur


Gastrointestinal: Normal bowel sounds, Soft and benign, Non-distended, Other 

(Suprapubic tenderness)


Musculoskeletal: No tenderness, Swelling (Right foot)


Integumentary: No rashes, No erythema


Neurological: Normal speech, Normal strength at 5/5 x4 extr, Cranial nerves 3-12

 intact





- Studies


Laboratory Data (last 24 hrs)





01/02/21 10:47: PT 12.7 H, INR 1.08


01/02/21 10:47: Sodium 147 H, Potassium 2.8 L*, BUN 21 H, Creatinine 0.87, 

Glucose 104, Magnesium 2.3, Total Bilirubin 0.4, AST 24, ALT 12, Alkaline 

Phosphatase 43 L


01/02/21 10:18: WBC 12.4 H, Hgb 10.8 L, Hct 34.4 L, Plt Count 343








Assessment and Plan





- Problems (Diagnosis)


(1) Unstable angina


Current Visit: Yes   Status: Acute   





(2) NSTEMI (non-ST elevated myocardial infarction)


Current Visit: Yes   Status: Acute   





(3) History of coronary artery bypass graft


Current Visit: Yes   Status: Acute   





(4) History of DVT (deep vein thrombosis)


Current Visit: Yes   Status: Acute   





(5) Cervical cancer


Current Visit: Yes   Status: Acute   





(6) Hypertension


Current Visit: No   Status: Acute   





(7) Hypokalemia


Current Visit: No   Status: Acute   





- Plan


Admit to the hospital.


Will start Heparin drip per cardiology recommendation.


Start aspirin, Plavix, metoprolol.


NTG p.r.n.


Obtain echocardiogram


Cardiology consult.  Dr. Wyman is planning cardiac catheterization.


Trend troponin


Obtain UA, COVID 19 test.


Follow up with cervical cancer as an outpatient.


Patient will need lifetime anticoagulation.


Patient reports no intolerance to Eliquis. Trial of Xarelto on discharge.





- Advance Directives


Does patient have a Living Will: No


Does patient have a Durable POA for Healthcare: No

## 2021-01-02 NOTE — RAD REPORT
EXAM DESCRIPTION:  USExtremity Venous Uni Ltd1/2/2021 12:41 pm

 

CLINICAL HISTORY:  Right leg swelling.

 

COMPARISON:  November 2020

 

FINDINGS:  Right common femoral, superficial femoral, popliteal and right posterior tibial veins are 
compressible and demonstrate augmentation. Doppler demonstrates good flow.

 

Right femoral vein thrombus has resolved

 

IMPRESSION:  No evidence of deep venous thrombosis involving the right lower extremity.

## 2021-01-03 LAB
BUN BLD-MCNC: 20 MG/DL (ref 7–18)
GLUCOSE SERPLBLD-MCNC: 92 MG/DL (ref 74–106)
HCT VFR BLD CALC: 27.1 % (ref 36–45)
LYMPHOCYTES # SPEC AUTO: 1.1 K/UL (ref 0.7–4.9)
PMV BLD: 8.7 FL (ref 7.6–11.3)
POTASSIUM SERPL-SCNC: 4.2 MMOL/L (ref 3.5–5.1)
RBC # BLD: 3.05 M/UL (ref 3.86–4.86)

## 2021-01-03 RX ADMIN — SENNOSIDES SCH: 8.6 TABLET, FILM COATED ORAL at 21:00

## 2021-01-03 RX ADMIN — CLOPIDOGREL BISULFATE SCH MG: 75 TABLET, FILM COATED ORAL at 08:01

## 2021-01-03 RX ADMIN — HEPARIN SODIUM SCH: 5000 INJECTION, SOLUTION INTRAVENOUS; SUBCUTANEOUS at 22:00

## 2021-01-03 RX ADMIN — SENNOSIDES SCH MG: 8.6 TABLET, FILM COATED ORAL at 21:42

## 2021-01-03 RX ADMIN — ASPIRIN SCH MG: 81 TABLET, COATED ORAL at 08:25

## 2021-01-03 RX ADMIN — METOPROLOL TARTRATE SCH MG: 25 TABLET ORAL at 08:05

## 2021-01-03 RX ADMIN — Medication SCH ML: at 08:04

## 2021-01-03 RX ADMIN — Medication SCH ML: at 21:47

## 2021-01-03 RX ADMIN — METOPROLOL TARTRATE SCH MG: 25 TABLET ORAL at 21:45

## 2021-01-03 NOTE — EKG
Test Date:    2021-01-02               Test Time:    10:50:26

Technician:   ALLEN                                    

                                                     

MEASUREMENT RESULTS:                                       

Intervals:                                           

Rate:         51                                     

NE:           148                                    

QRSD:         74                                     

QT:           454                                    

QTc:          418                                    

Axis:                                                

P:            43                                     

NE:           148                                    

QRS:          -4                                     

T:            -59                                    

                                                     

INTERPRETIVE STATEMENTS:                                       

                                                     

Sinus bradycardia

ST & T wave abnormality, consider inferolateral ischemia

Abnormal ECG

Compared to ECG 01/02/2021 09:58:27

Possible ischemia now present

Sinus rhythm no longer present

ST (T wave) deviation still present



Electronically Signed On 01-03-21 15:26:41 CST by Eloy Wyman

## 2021-01-03 NOTE — P.PN
Subjective


Date of Service: 01/03/21


Chief Complaint: Chest pain


Patient complaining of intermittent chest pain.


She is also complaining of constipation.


Troponin trended flat.








Physical Examination





- Vital Signs


Temperature: 97.8 F


Blood Pressure: 147/49


Pulse: 56


Respirations: 20


Pulse Ox (%): 98





- Physical Exam


General: Alert, In no apparent distress


Neck: JVD not distended


Respiratory: Clear to auscultation bilaterally, Normal air movement


Cardiovascular: No edema, Regular rate/rhythm, Normal S1 S2


Gastrointestinal: Normal bowel sounds, Soft and benign, Non-distended


Musculoskeletal: No swelling


Integumentary: No rashes


Neurological: Other (No focal deficit.)





Assessment And Plan





- Current Problems (Diagnosis)


(1) Unstable angina


Current Visit: Yes   Status: Acute   





(2) NSTEMI (non-ST elevated myocardial infarction)


Current Visit: Yes   Status: Acute   





(3) History of coronary artery bypass graft


Current Visit: Yes   Status: Acute   





(4) History of DVT (deep vein thrombosis)


Current Visit: Yes   Status: Acute   





(5) Cervical cancer


Current Visit: Yes   Status: Acute   





(6) Hypertension


Current Visit: No   Status: Acute   





(7) Hypokalemia


Current Visit: No   Status: Acute   





(8) UTI (urinary tract infection)


Current Visit: Yes   Status: Acute   





(9) Functional constipation


Current Visit: Yes   Status: Acute   





- Plan


Continue Heparin drip per cardiology recommendation.


On aspirin, Plavix,and metoprolol.


NTG p.r.n.


Echocardiogram requested


Dr. Wyman is planning cardiac catheterization tomorrow.


COVID 19 test is negative.


Obtain UA given leukocytosis and start empiric antibiotics.


Follow up with cervical cancer as an outpatient.


Patient will need lifetime anticoagulation.


Patient reports intolerance to Eliquis. Trial of Xarelto on discharge.


Laxatives and stool softeners for chronic constipation.

## 2021-01-03 NOTE — EKG
Test Date:    2021-01-02               Test Time:    09:58:27

Technician:   ALLEN                                    

                                                     

MEASUREMENT RESULTS:                                       

Intervals:                                           

Rate:         82                                     

TX:           176                                    

QRSD:         76                                     

QT:           338                                    

QTc:          394                                    

Axis:                                                

P:            47                                     

TX:           176                                    

QRS:          -23                                    

T:            162                                    

                                                     

INTERPRETIVE STATEMENTS:                                       

                                                     

Normal sinus rhythm

Marked ST abnormality, possible inferior subendocardial injury

Abnormal ECG

Compared to ECG 09/28/2020 11:26:59

ST (T wave) deviation now present



Electronically Signed On 01-03-21 15:26:44 CST by Eloy Wyman

## 2021-01-04 LAB
BUN BLD-MCNC: 22 MG/DL (ref 7–18)
GLUCOSE SERPLBLD-MCNC: 100 MG/DL (ref 74–106)
HCT VFR BLD CALC: 25.5 % (ref 36–45)
HCT VFR BLD CALC: 31.9 % (ref 36–45)
LYMPHOCYTES # SPEC AUTO: 0.5 K/UL (ref 0.7–4.9)
LYMPHOCYTES # SPEC AUTO: 1.3 K/UL (ref 0.7–4.9)
MORPHOLOGY BLD-IMP: (no result)
PMV BLD: 8.6 FL (ref 7.6–11.3)
PMV BLD: 8.8 FL (ref 7.6–11.3)
POTASSIUM SERPL-SCNC: 3.6 MMOL/L (ref 3.5–5.1)
RBC # BLD: 2.89 M/UL (ref 3.86–4.86)
RBC # BLD: 3.6 M/UL (ref 3.86–4.86)

## 2021-01-04 PROCEDURE — 4A023N7 MEASUREMENT OF CARDIAC SAMPLING AND PRESSURE, LEFT HEART, PERCUTANEOUS APPROACH: ICD-10-PCS

## 2021-01-04 PROCEDURE — 027034Z DILATION OF CORONARY ARTERY, ONE ARTERY WITH DRUG-ELUTING INTRALUMINAL DEVICE, PERCUTANEOUS APPROACH: ICD-10-PCS

## 2021-01-04 PROCEDURE — B2111ZZ FLUOROSCOPY OF MULTIPLE CORONARY ARTERIES USING LOW OSMOLAR CONTRAST: ICD-10-PCS

## 2021-01-04 RX ADMIN — METOPROLOL TARTRATE SCH: 25 TABLET ORAL at 09:00

## 2021-01-04 RX ADMIN — Medication SCH ML: at 09:00

## 2021-01-04 RX ADMIN — METOPROLOL TARTRATE SCH MG: 25 TABLET ORAL at 20:46

## 2021-01-04 RX ADMIN — SENNOSIDES SCH: 8.6 TABLET, FILM COATED ORAL at 09:00

## 2021-01-04 RX ADMIN — SENNOSIDES SCH MG: 8.6 TABLET, FILM COATED ORAL at 20:46

## 2021-01-04 RX ADMIN — ASPIRIN SCH MG: 81 TABLET, COATED ORAL at 07:15

## 2021-01-04 RX ADMIN — CLOPIDOGREL BISULFATE SCH MG: 75 TABLET, FILM COATED ORAL at 07:15

## 2021-01-04 RX ADMIN — Medication SCH ML: at 20:46

## 2021-01-04 RX ADMIN — SENNOSIDES SCH: 8.6 TABLET, FILM COATED ORAL at 20:58

## 2021-01-04 NOTE — P.PN
Subjective


Date of Service: 01/04/21


Chief Complaint: Chest pain


Status post cardiac catheterization today.


Patient had her RCA stented.











Physical Examination





- Vital Signs


Temperature: 97.4 F


Blood Pressure: 158/73


Pulse: 59


Respirations: 18


Pulse Ox (%): 99





- Physical Exam


General: Alert, In no apparent distress


Neck: Supple, JVD not distended


Respiratory: Clear to auscultation bilaterally, Normal air movement


Cardiovascular: Regular rate/rhythm, Normal S1 S2


Gastrointestinal: Soft and benign, Non-distended, No tenderness


Musculoskeletal: No swelling, No tenderness


Integumentary: No rashes


Neurological: Normal speech, Normal strength at 5/5 x4 extr





Assessment And Plan





- Current Problems (Diagnosis)


(1) Unstable angina


Current Visit: Yes   Status: Acute   





(2) NSTEMI (non-ST elevated myocardial infarction)


Current Visit: Yes   Status: Acute   





(3) History of coronary artery bypass graft


Current Visit: Yes   Status: Acute   





(4) History of DVT (deep vein thrombosis)


Current Visit: Yes   Status: Acute   





(5) Cervical cancer


Current Visit: Yes   Status: Acute   





(6) Hypertension


Current Visit: No   Status: Acute   





(7) Hypokalemia


Current Visit: No   Status: Acute   





(8) Functional constipation


Current Visit: Yes   Status: Acute   





(9) Leukocytosis


Current Visit: Yes   Status: Acute   





- Plan


Status post cardiac catheterization with stenting of RCA.


Cardiologist recommend to continue heparin drip and added nitroglycerin drip


On aspirin, Plavix,and metoprolol.


COVID 19 test is negative.


UA uncollected.


Obtain UA given leukocytosis and history of cervical cancer.


Follow up with cervical cancer as an outpatient.


Patient will need lifetime anticoagulation for history of lower extremity DVTs


Patient reports intolerance to Eliquis. Trial of Xarelto on discharge.


Laxatives and stool softeners for chronic constipation.

## 2021-01-04 NOTE — PN
Date of Progress Note:  01/03/2021



Ms. Hughes had a history of CABG in 1997, although she says she __________ doubt, but nevertheless s
he basically came in with non-ST elevation myocardial infarction.  Her potassium was 2.8, was supplem
ented.  Her troponin actually increased since admission.  She has diabetes.  She has hypertension.  H
as not had any further chest pain since admission.  Plan for a heart catheterization on 01/04/2021 to
 define her coronary anatomy.  The patient understands the risks and the benefits of the procedure.  
She agreed to proceed.  We will continue her present regimen for now.  She has questionable allergy t
o iodine.  We will give her some Solu-Medrol in the cath lab.





NB/MARSHA

DD:  01/04/2021 09:29:56Voice ID:  [id number]

DT:  01/04/2021 09:48:50Report ID:  [id number]

## 2021-01-04 NOTE — OP
Date of Procedure:  01/04/2021



Surgeon:  Eloy Wyman MD



Assistant:  Rose Reyes.



The patient was admitted to Dr. Mcclelland on 01/02/2021 with a non-STEMI.  The patient was brought to [facility] cath lab today on 01/04/2021 for a heart catheterization.



Indication:  Non-STEMI.



History Of Present Illness:  Ms. Hughes has had bypass surgery in 1997.  Has a cardiac risk factors 
as well, possible allergy to iodine, normal creatinine, was brought to the cath lab today as an inpat
ient, prepped and draped in the routine sterile fashion, given Versed and fentanyl for sedation.  Usi
ng the Seldinger technique, 10 cc of xylocaine were injected in the right common femoral artery area.
  A 6-Greenlandic sheath was used to cannulate the right common femoral artery.  Angio-Seal was used to cl
ose the case.  Angiography there was normal.  A JL4 catheter was used to inject the left main that 
owed about a 50% to 60% stenosis.  There was 100% occlusion of the LAD.  There was a 90% mid circ and
 a 70% proximal circ stenosis.  The JR4 was used to cannulate the RCA, which was completely occluded.
  The RCA was then moved to cannulate the vein graft to the OM, which was completely occluded, the ve
in graft to the RCA which was completely occluded, and a LIMA to the LAD that was widely patent.  We 
proceeded to intervene with the RCA.  We used an XB3.5 guide with side hole to cannulate the left celestine
n.  A [place] Bernard wire crossed the circumflex distally successfully.  Initially, a 2.5 x 12 Emerge noncomp
liant balloon was used to dilate the mid circ, proximal circ.  This resulted in an excellent result i
n the mid circ; however, the proximal circ was dissected with loss of the first OM.  I decided to put
 a 2.5 x 16 Synergy stent in the proximal circumflex with excellent 0% residual.  We are going to jesus
at the left main medically.  The only complication was occlusion of the OM side branch with some ches
t pain.  The patient has received aspirin and Plavix today.  I will give her some more Plavix when 
john is able to take p.o.  She could not swallow anything while she was laying down.  She did receive An
giomax and aspirin.



Blood Loss:  5 cc.



Postoperative Diagnosis:  Severe coronary artery disease, status post angioplasty of the mid circ, an
gioplasty and stent of the proximal circ for proximal circ dissection.  I would like to put her in th
e ICU because of mild chest pain probably because of the loss of the obtuse marginal.  We will give h
er nitroglycerin drip and heparin drip.  Continue her other regimen including aspirin, statin, beta-b
lockers, and Plavix.



Anesthesia:  Total conscious sedation was 75 minutes.





NB/MODL

DD:  01/04/2021 08:38:39Voice ID:  [id number]

DT:  01/04/2021 09:01:10Report ID:  [id number]

## 2021-01-05 LAB
BUN BLD-MCNC: 25 MG/DL (ref 7–18)
GLUCOSE SERPLBLD-MCNC: 143 MG/DL (ref 74–106)
HCT VFR BLD CALC: 25.3 % (ref 36–45)
LYMPHOCYTES # SPEC AUTO: 1.4 K/UL (ref 0.7–4.9)
MAGNESIUM SERPL-MCNC: 2 MG/DL (ref 1.8–2.4)
PMV BLD: 8.9 FL (ref 7.6–11.3)
PMV BLD: 9 FL (ref 7.6–11.3)
POTASSIUM SERPL-SCNC: 3.5 MMOL/L (ref 3.5–5.1)
RBC # BLD: 2.84 M/UL (ref 3.86–4.86)
URINE UROTHELIAL CELLS: <5 /HPF

## 2021-01-05 RX ADMIN — LEVOFLOXACIN SCH MLS: 5 INJECTION, SOLUTION INTRAVENOUS at 09:33

## 2021-01-05 RX ADMIN — NITROGLYCERIN PRN MG: 0.4 TABLET SUBLINGUAL at 23:35

## 2021-01-05 RX ADMIN — METOPROLOL TARTRATE SCH MG: 25 TABLET ORAL at 09:33

## 2021-01-05 RX ADMIN — NITROGLYCERIN PRN MG: 0.4 TABLET SUBLINGUAL at 21:22

## 2021-01-05 RX ADMIN — Medication SCH ML: at 09:34

## 2021-01-05 RX ADMIN — NITROGLYCERIN PRN MG: 0.4 TABLET SUBLINGUAL at 03:55

## 2021-01-05 RX ADMIN — SENNOSIDES SCH MG: 8.6 TABLET, FILM COATED ORAL at 09:34

## 2021-01-05 RX ADMIN — Medication SCH ML: at 20:07

## 2021-01-05 RX ADMIN — METOPROLOL TARTRATE SCH MG: 25 TABLET ORAL at 20:07

## 2021-01-05 RX ADMIN — ASPIRIN SCH MG: 81 TABLET, COATED ORAL at 09:33

## 2021-01-05 RX ADMIN — SENNOSIDES SCH: 8.6 TABLET, FILM COATED ORAL at 09:00

## 2021-01-05 RX ADMIN — CLOPIDOGREL BISULFATE SCH MG: 75 TABLET, FILM COATED ORAL at 09:33

## 2021-01-05 RX ADMIN — SENNOSIDES SCH: 8.6 TABLET, FILM COATED ORAL at 20:07

## 2021-01-05 RX ADMIN — MORPHINE SULFATE PRN MG: 2 INJECTION, SOLUTION INTRAMUSCULAR; INTRAVENOUS at 03:55

## 2021-01-05 NOTE — P.PN
Subjective


Date of Service: 01/05/21


Chief Complaint: Chest pain


Subjective: Other (had chest pain overnight, feeling better, remains anxious. 

continues with urinary frequency/dysuria, constipation.)





Review of Systems


10-point ROS is otherwise unremarkable





Physical Examination





- Vital Signs


Temperature: 99.6 F


Blood Pressure: 149/73


Pulse: 64


Respirations: 18


Pulse Ox (%): 100





- Physical Exam


General: Alert, In no apparent distress, Oriented x3


HEENT: Sclerae nonicteric


Respiratory: Clear to auscultation bilaterally


Cardiovascular: No edema, Regular rate/rhythm


Gastrointestinal: Soft and benign, No tenderness


Musculoskeletal: No tenderness


Integumentary: No rashes





Assessment & Plan


Physician Review Additional Text: 





UTI


Unstable Angina


NSTEMI s/p stent


CAD s/p CABG


h/o DVT


h/o cervical cancer


HTN


functional constipation


 





Leukocytosis worsening, likely due to UTI, UA positive, and +symptoms


just finished macrobid for UTI, culture reviewed. will start on Levaquin for now


f/u cultures


s/p cardiac cath with RCA stenting


continue aspirin / plavix, metoprolol


pt reports h/o RLE DVT this past year, states she was ill and didn't get out of 

bed, was on eliquis but had side effects, was told to just continue 

aspirin/plavix


Laxatives and stool softeners for chronic constipation.





Dispo: anticipate dc home in ~48hrs, awaiting urine culture / sensitivities


ok to transfer out of ICU





Time Spent Managing Pts Care (In Minutes): 35

## 2021-01-06 LAB
BUN BLD-MCNC: 24 MG/DL (ref 7–18)
FERRITIN SERPL-MCNC: 268.1 NG/ML (ref 8–388)
GLUCOSE SERPLBLD-MCNC: 94 MG/DL (ref 74–106)
HCT VFR BLD CALC: 24.4 % (ref 36–45)
IRON SERPL-MCNC: 17 UG/DL (ref 50–170)
LYMPHOCYTES # SPEC AUTO: 1.1 K/UL (ref 0.7–4.9)
MAGNESIUM SERPL-MCNC: 1.9 MG/DL (ref 1.8–2.4)
PMV BLD: 8.5 FL (ref 7.6–11.3)
PMV BLD: 8.7 FL (ref 7.6–11.3)
POTASSIUM SERPL-SCNC: 3.1 MMOL/L (ref 3.5–5.1)
RBC # BLD: 2.75 M/UL (ref 3.86–4.86)
TRANSFERRIN SERPL-MCNC: 147 MG/DL (ref 200–360)
TSH SERPL DL<=0.05 MIU/L-ACNC: 1.17 UIU/ML (ref 0.36–3.74)

## 2021-01-06 RX ADMIN — Medication SCH: at 08:07

## 2021-01-06 RX ADMIN — MORPHINE SULFATE PRN MG: 2 INJECTION, SOLUTION INTRAMUSCULAR; INTRAVENOUS at 12:51

## 2021-01-06 RX ADMIN — Medication SCH ML: at 20:44

## 2021-01-06 RX ADMIN — NITROGLYCERIN PRN MG: 0.4 TABLET SUBLINGUAL at 19:47

## 2021-01-06 RX ADMIN — DOCUSATE SODIUM SCH MG: 100 CAPSULE, LIQUID FILLED ORAL at 08:06

## 2021-01-06 RX ADMIN — METOPROLOL TARTRATE SCH MG: 25 TABLET ORAL at 20:38

## 2021-01-06 RX ADMIN — SENNOSIDES SCH MG: 8.6 TABLET, FILM COATED ORAL at 20:38

## 2021-01-06 RX ADMIN — NITROGLYCERIN PRN MG: 0.4 TABLET SUBLINGUAL at 07:23

## 2021-01-06 RX ADMIN — CLOPIDOGREL BISULFATE SCH MG: 75 TABLET, FILM COATED ORAL at 08:06

## 2021-01-06 RX ADMIN — METOPROLOL TARTRATE SCH MG: 25 TABLET ORAL at 08:06

## 2021-01-06 RX ADMIN — DOCUSATE SODIUM SCH MG: 100 CAPSULE, LIQUID FILLED ORAL at 20:38

## 2021-01-06 RX ADMIN — ASPIRIN SCH MG: 81 TABLET, COATED ORAL at 08:06

## 2021-01-06 RX ADMIN — Medication SCH ML: at 08:07

## 2021-01-06 RX ADMIN — SENNOSIDES SCH MG: 8.6 TABLET, FILM COATED ORAL at 08:07

## 2021-01-06 NOTE — ECHO
HEIGHT: 5 ft 2 in   WEIGHT: 96 lb 0 oz   DATE OF STUDY: 01/05/2021   REFER DR: 
jair lance

2-DIMENSIONAL: YES

     M.MODE: YES

 DOPPLER: YES

COLOR FLOW: YES



                    TDS:  NO

PORTABLE: NO

 DEFINITY:  NO

BUBBLE STUDY: NO





DIAGNOSIS:  NSTEMI



CARDIAC HISTORY:  

CATHERIZATION: YES

SURGERY: YES

PROSTHETIC VALVE: NO

PACEMAKER: NO





MEASUREMENTS (cm)

    DIASTOLIC (NORMALS)                 SYSTOLIC (NORMALS)

IVSd                 0.9 (0.6-1.2)                    LA Diam 3.2 (1.9-4.0)     LVEF       
  72%  

LVIDd               4.3 (3.5-5.7)                        LVIDs      2.5 (2.0-3.5)     %FS  
        41%

LVPWd             1.0 (0.6-1.2)

Ao Diam           2.6 (2.0-3.7)



2 DIMENSIONAL ASSESSMENT:

RIGHT ATRIUM:                   NORMAL

LEFT ATRIUM:       NORMAL



RIGHT VENTRICLE:            NORMAL

LEFT VENTRICLE: NORMAL



TRICUSPID VALVE:             NORMAL

MITRAL VALVE:     NORMAL



PULMONIC VALVE:             NORMAL

AORTIC VALVE:     SCLEROSIS



PERICARDIAL EFFUSION: NONE

AORTIC ROOT:      NORMAL





LEFT VENTRICULAR WALL MOTION:     NORMAL



DOPPLER/COLOR FLOW:     NORMAL



COMMENTS:      AORTIC SCLEROSIS WITH NO STENOSIS. NORMAL LEFT VENTRICULAR SIZE AND 
FUNCTION. NO WALL MOTION ABNORMALITY.



TECHNOLOGIST:   ALEN PEARSON

## 2021-01-06 NOTE — P.PN
Subjective


Date of Service: 01/06/21


Chief Complaint: Chest pain


Subjective: No new changes (occasionally having some chest pain, continues with 

some anxiety. hgb <8 this morning)





Review of Systems


10-point ROS is otherwise unremarkable





Physical Examination





- Vital Signs


Temperature: 98.1 F


Blood Pressure: 125/66


Pulse: 69


Respirations: 18


Pulse Ox (%): 99





- Physical Exam


General: Alert, In no apparent distress


HEENT: Sclerae nonicteric


Respiratory: Clear to auscultation bilaterally


Cardiovascular: No edema, Regular rate/rhythm


Gastrointestinal: Soft and benign, No tenderness


Musculoskeletal: No tenderness


Integumentary: No rashes


Neurological: Normal speech, Normal affect





Assessment & Plan


Physician Review Additional Text: 





UTI


Unstable Angina


NSTEMI s/p stent


CAD s/p CABG


h/o DVT


h/o cervical cancer


HTN


functional constipation


acute on chronic anemia


 





continues with leukocytosis, slightly better, did receive steroids during cath, 

also due to UTI


was on macrobid for UTI, culture reviewed, started on levaquin, f/u urine 

culture this hospitalization


s/p cardiac cath with RCA stenting  - continue aspirin / plavix, metoprolol


pt reports h/o RLE DVT this past year, states she was ill and didn't get out of 

bed, was on eliquis but had side effects, was told to just continue 

aspirin/plavix, does not want eliquis/xarelto, discussed risks/benefits


Laxatives and stool softeners for chronic constipation.


enema today for continued constipation


no obvious bleeding. pt does report recent h/o alma delia blood in urine a few weeks 

ago.


anemia labs more consistent with anemia of chronic disease. she does report 

previously taking iron





Dispo: anticipate dc home tomorrow, awaiting urine sensitivities





Time Spent Managing Pts Care (In Minutes): 35

## 2021-01-06 NOTE — EKG
Test Date:    2021-01-05               Test Time:    21:49:20

Technician:   JERRYG                                   

                                                     

MEASUREMENT RESULTS:                                       

Intervals:                                           

Rate:         69                                     

ME:           152                                    

QRSD:         80                                     

QT:           406                                    

QTc:          435                                    

Axis:                                                

P:            55                                     

ME:           152                                    

QRS:          -5                                     

T:            122                                    

                                                     

INTERPRETIVE STATEMENTS:                                       

                                                     

Normal sinus rhythm

Nonspecific ST and T wave abnormality

Abnormal ECG

Compared to ECG 01/05/2021 04:06:10

Sinus bradycardia no longer present

Possible ischemia no longer present

ST (T wave) deviation still present



Electronically Signed On 01-06-21 17:31:40 CST by Eloy Wyman

## 2021-01-06 NOTE — EKG
Test Date:    2021-01-05               Test Time:    04:06:10

Technician:   RT-O                                   

                                                     

MEASUREMENT RESULTS:                                       

Intervals:                                           

Rate:         54                                     

CA:           138                                    

QRSD:         70                                     

QT:           466                                    

QTc:          441                                    

Axis:                                                

P:            49                                     

CA:           138                                    

QRS:          1                                      

T:            240                                    

                                                     

INTERPRETIVE STATEMENTS:                                       

                                                     

Sinus bradycardia

ST & T wave abnormality, consider inferolateral ischemia

Abnormal ECG

Compared to ECG 01/02/2021 10:50:26

No significant changes



Electronically Signed On 01-06-21 06:31:57 CST by Eloy Wyman

## 2021-01-07 LAB
ANISOCYTOSIS BLD QL: (no result)
BLD SMEAR INTERP: (no result)
BUN BLD-MCNC: 20 MG/DL (ref 7–18)
GLUCOSE SERPLBLD-MCNC: 89 MG/DL (ref 74–106)
HCT VFR BLD CALC: 30.5 % (ref 36–45)
HCT VFR BLD CALC: 31.4 % (ref 36–45)
LYMPHOCYTES # SPEC AUTO: 1 K/UL (ref 0.7–4.9)
MAGNESIUM SERPL-MCNC: 2 MG/DL (ref 1.8–2.4)
MORPHOLOGY BLD-IMP: (no result)
PMV BLD: 8.5 FL (ref 7.6–11.3)
POTASSIUM SERPL-SCNC: 3.9 MMOL/L (ref 3.5–5.1)
RBC # BLD: 3.45 M/UL (ref 3.86–4.86)
UA DIPSTICK W REFLEX MICRO PNL UR: (no result)

## 2021-01-07 RX ADMIN — ASPIRIN SCH MG: 81 TABLET, COATED ORAL at 08:50

## 2021-01-07 RX ADMIN — Medication SCH: at 21:00

## 2021-01-07 RX ADMIN — Medication SCH ML: at 21:06

## 2021-01-07 RX ADMIN — DOCUSATE SODIUM SCH MG: 100 CAPSULE, LIQUID FILLED ORAL at 21:04

## 2021-01-07 RX ADMIN — Medication SCH ML: at 08:51

## 2021-01-07 RX ADMIN — AMOXICILLIN AND CLAVULANATE POTASSIUM SCH MG: 875; 125 TABLET, FILM COATED ORAL at 21:05

## 2021-01-07 RX ADMIN — LEVOFLOXACIN SCH MLS: 5 INJECTION, SOLUTION INTRAVENOUS at 06:40

## 2021-01-07 RX ADMIN — METOPROLOL TARTRATE SCH MG: 25 TABLET ORAL at 08:49

## 2021-01-07 RX ADMIN — METOPROLOL TARTRATE SCH MG: 25 TABLET ORAL at 21:05

## 2021-01-07 RX ADMIN — DOCUSATE SODIUM SCH MG: 100 CAPSULE, LIQUID FILLED ORAL at 08:50

## 2021-01-07 RX ADMIN — Medication SCH: at 08:51

## 2021-01-07 RX ADMIN — SENNOSIDES SCH MG: 8.6 TABLET, FILM COATED ORAL at 21:04

## 2021-01-07 RX ADMIN — SENNOSIDES SCH MG: 8.6 TABLET, FILM COATED ORAL at 08:49

## 2021-01-07 RX ADMIN — CLOPIDOGREL BISULFATE SCH MG: 75 TABLET, FILM COATED ORAL at 08:50

## 2021-01-07 NOTE — PN
Date of Progress Note:  01/05/2021



Subjective:  Ms. Hughes had been admitted over the weekend with a non-ST elevation myocardial infarc
tion.  She was taken to the cath lab on 01/05/2021, where a heart catheterization revealed a complete
ly occluded vein graft to the OM, completely occluded vein graft to the RCA.  She had a completely oc
cluded native RCA.  She had a 90% proximal stenosis on the circumflex.  She had a LIMA to the LAD cheryl
t was open.  A stent was placed successfully in the proximal circumflex.  The mid circumflex was dila
davonte with 0% residual.  When we did place the stent, a significant size OM branch was occluded after t
he procedure.  She continued to have intermittent chest pain, which is really expected.  She has a co
mpletely occluded RCA, completely occluded RCA graft and occluded OM.  She has some mild to moderate 
left main disease with a patent LIMA to the LAD.  She had normal vital signs.  Her blood pressure 125
/66 with a temperature of 98.1.  Her right groin was adequate without any hematoma or ecchymosis.  He
r O2 saturation was 96%.  However, she did have an elevated white count of 14.7.  Her creatinine jim
ined normal at 0.99.  She was anemic at 7.8.  I recommended transfusion for her anemia.  When she cam
e in, her hemoglobin was 8, so this is chronic.  She needs to be treated for her white count with ant
ibiotics.  The case was discussed with Dr. Dietz.  The patient is on aspirin, Plavix, metoprolol 25 b
.i.d.  She needs to be on a statin.  We will need to increase her metoprolol to 50 b.i.d. and if she 
continues to have symptoms, we should add Ranexa 500 mg b.i.d.  The patient is not a candidate to be 
taken back to the cath lab.  She does not have any EKG changes.  We will continue to follow her as ne
eded.





NB/MARSHA

DD:  01/07/2021 18:53:47Voice ID:  [id number]

DT:  01/07/2021 20:24:16Report ID:  [id number]

## 2021-01-07 NOTE — P.PN
Subjective


Date of Service: 01/07/21


Chief Complaint: Chest pain


Subjective: No new changes (overall feeling better, anxious to be dc'd home with

continued chest pain. also reporting some "brown clumps" in urine since 

admission. slight dysuria persists but improved)





Review of Systems


10-point ROS is otherwise unremarkable





Physical Examination





- Vital Signs


Temperature: 98.9 F


Blood Pressure: 135/65


Pulse: 60


Respirations: 17


Pulse Ox (%): 97





- Physical Exam


General: Alert, In no apparent distress


HEENT: Sclerae nonicteric


Respiratory: Clear to auscultation bilaterally


Cardiovascular: No edema, Regular rate/rhythm


Gastrointestinal: Soft and benign, No tenderness


Musculoskeletal: No tenderness


Integumentary: No rashes





Assessment & Plan


Physician Review Additional Text: 





UTI


Unstable Angina


NSTEMI s/p stent


CAD s/p CABG


h/o DVT


h/o cervical cancer


HTN


functional constipation


acute on chronic anemia


 





continues with leukocytosis, increased, partly due to steroids during cath 

procedure


was on macrobid for UTI, culture reviewed - enterococcus, started on levaquin, 

Urine Cx: enteroccocus this hospitalization again


given continued symptoms, will switch to Augmentin (penicillin sensitive)


repeat UA, check procalcitonin


s/p cardiac cath with RCA stenting  - continue aspirin / plavix, metoprolol


pt reports h/o RLE DVT this past year, states she was ill and didn't get out of 

bed, was on eliquis but had side effects and hematuria, was told to just 

continue aspirin/plavix, does not want eliquis/xarelto, discussed risks/benefits


Laxatives and stool softeners for chronic constipation.


no obvious bleeding. pt does report recent h/o alma delia blood in urine a few weeks 

ago.


anemia labs more consistent with anemia of chronic disease. she does report 

previously taking iron





Dispo: anticipate dc home tomorrow 





Time Spent Managing Pts Care (In Minutes): 35

## 2021-01-08 VITALS — DIASTOLIC BLOOD PRESSURE: 80 MMHG | SYSTOLIC BLOOD PRESSURE: 155 MMHG | TEMPERATURE: 99.1 F

## 2021-01-08 VITALS — OXYGEN SATURATION: 97 %

## 2021-01-08 LAB
BUN BLD-MCNC: 22 MG/DL (ref 7–18)
GLUCOSE SERPLBLD-MCNC: 90 MG/DL (ref 74–106)
HCT VFR BLD CALC: 28.1 % (ref 36–45)
LYMPHOCYTES # SPEC AUTO: 1.1 K/UL (ref 0.7–4.9)
MAGNESIUM SERPL-MCNC: 1.9 MG/DL (ref 1.8–2.4)
PMV BLD: 8.6 FL (ref 7.6–11.3)
POTASSIUM SERPL-SCNC: 3.9 MMOL/L (ref 3.5–5.1)
RBC # BLD: 3.21 M/UL (ref 3.86–4.86)

## 2021-01-08 RX ADMIN — Medication SCH ML: at 09:42

## 2021-01-08 RX ADMIN — AMOXICILLIN AND CLAVULANATE POTASSIUM SCH MG: 875; 125 TABLET, FILM COATED ORAL at 09:41

## 2021-01-08 RX ADMIN — Medication SCH ML: at 09:43

## 2021-01-08 RX ADMIN — SENNOSIDES SCH MG: 8.6 TABLET, FILM COATED ORAL at 09:42

## 2021-01-08 RX ADMIN — METOPROLOL TARTRATE SCH MG: 25 TABLET ORAL at 09:42

## 2021-01-08 RX ADMIN — ASPIRIN SCH MG: 81 TABLET, COATED ORAL at 09:41

## 2021-01-08 RX ADMIN — CLOPIDOGREL BISULFATE SCH MG: 75 TABLET, FILM COATED ORAL at 09:42

## 2021-01-08 RX ADMIN — DOCUSATE SODIUM SCH MG: 100 CAPSULE, LIQUID FILLED ORAL at 09:42

## 2021-01-08 NOTE — P.DS
Admission Date: 01/02/21


Discharge Date: 01/08/21


Disposition: DC HOME/HOME HEALTH CARE


Discharge Condition: FAIR


Reason for Admission: Chest pain


Consultations: 


Cardiology - Dr. Wyman





Procedures: 


CXR (1/2): No acute abnormalities displayed


Venous U/S (1/2): No evidence of deep venous thrombosis involving the right 

lower extremity.


Cardiac Cath (1/4): 100% occlusion of LAD, 90% mid circ, and 70% proximal circ 

stenosis. completely occluded vein graft to OM, vein graft to RCA. completely 

occluded native RCA. LIMA to LAD wide open. Stent placed in proximal circumflex.


Urine Cx (1/4): Enterococcus Faecalis


Echo (1/5): EF: 72%, Aortic sclerosis with no stenosis. normal LV size and 

function. no wall motion abnormality


CT Abd (1/7): Large staghorn calculus involving the right kidney again noted. 

Punctate left nephrolithiasis also seen.


Punctate air bubble in the urinary bladder may be related to infection.


Moderate bilateral hydronephrosis is present. This is likely related to 

malignant cervical mass invading the urinary bladder. Metastatic adenopathy is 

seen in the right pelvis with slight bony erosion of the right SI joint seen on 

today's study. 


Cholelithiasis.  Small bilateral pleural effusions





Problem List


NSTEMI s/p stent (1/4/21)


UTI


Staghorn Calculi


CAD s/p CABG (1997)


h/o DVT


h/o cervical cancer


HTN


functional constipation


acute on chronic anemia





Brief History of Present Illness: 


76yo F, PMH: cervical cancer, h/o DVT, CAD s/p CABG presented to ED due to 

intermittent chest pain of several months duration. Reports severe intermittent 

episodes of chest pain that began night before admission. Chest pain worse with 

exertion and straining for bowel movement. She reports she was previously on 

Eliquis for DVT but was discontinued because she was getting tachycardia from 

it. EKG in ED: ST depression in V4, V5, & lead II. Given ASA, plavix, morphine, 

and started on heparin drip. Venous doppler of lower extremities was negative 

for DVT, though her right leg is more swollen compared to left.





Hospital Course: 


She underwent cardiac catheterization as noted above, with stent placement. 

During stent placement, a significant sized OM branch was occluded after the 

procedure and was thought to be the cause of the patient's continued 

intermittent chest pain post-operatively. She was discharged on aspirin, plavix,

metoprolol, and ranexa.


Her hospitalization was complicated by a leukocytosis noted on admission. A 

urinalysis was obtained on 1/4 which was concerning for a UTI. She reported 

recently being treated for a UTI with macrobid, and had been in and out of 

offices/ED over the past year for at least 6 times due to UTI. She was initially

treated with levaquin however her symptoms persisted, despite urine culture 

showing enterococcus was sensitive to levaquin. She was switched to Augmentin 

and repeat UA and procalcitonin had improved. Patient's symptoms had improved as

well. She then reported h/o staghorn calculi and hematuria. A CT was performed 

and revealed a staghorn calculi, moderate b/l hydronephrosis likely related to 

malignant cervical mass invading the urinary bladder. This was briefly reviewed 

over the phone with Urology who recommended outpatient follow up. Patient had 

improvement in her symptoms and mild improvement in her leukocytosis (partly due

to steroids used during cath for possible contrast allergy).





CT scan findings were discussed with patient and she stated she has OB/GYN and 

Heme/Onc physicians, was supposed to have a surgery to remove her cancer. 

however she just "stopped going".


Pt reported h/o RLE DVT within the past year, stating she was ill and didn't get

out of bed. She was placed on Eliquis but had side effects, tachycardia, and 

hematuria. She states she was told to dc the Eliquis and only taked 

aspirin/plavis. After a long discussion with the patient, she reported DVT 

occurred when she was lying around 2/2 being ill and unable to get out of bed. 

She states she was told to dc the Eliquis a few months ago. Risks/Benefifts were

discussed, and xarelto was offered upon discharge, however patient declined, 

stating her decision was due to the side-effects she got last time, ongoing 

anemia, recent hematuria, and tachycardia. She was instructed to take Augmentin 

(liquid form) twice a day for 10 days, then she is to take Macrobid once a day 

until she follows up with Dr. Almodovar.





She will need to f/u with Dr. Almodovar, Dr. Wyman, her heme/onc physicians and 

PCP.





Vital Signs/Physical Exam: 














Temp Pulse Resp BP Pulse Ox


 


 98.9 F   72   18   171/81 H  96 


 


 01/08/21 12:00  01/08/21 12:00  01/08/21 12:00  01/08/21 12:00  01/08/21 12:00








General: Alert, In no apparent distress, Oriented x3


HEENT: Sclerae nonicteric


Respiratory: Clear to auscultation bilaterally


Cardiovascular: Regular rate/rhythm, Edema (trace-1+ on RLE, trace on LLE)


Gastrointestinal: Soft and benign, No tenderness


Musculoskeletal: No tenderness


Integumentary: No significant lesion


Neurological: Normal speech


Laboratory Data at Discharge: 














WBC  15.2 K/uL (4.3-10.9)  H  01/08/21  05:51    


 


Hgb  9.3 g/dL (12.0-15.0)  L  01/08/21  05:51    


 


Hct  28.1 % (36.0-45.0)  L  01/08/21  05:51    


 


Plt Count  251 K/uL (152-406)   01/08/21  05:51    


 


PT  12.7 SECONDS (9.5-12.5)  H  01/02/21  10:47    


 


INR  1.08   01/02/21  10:47    


 


APTT  52.4 SECONDS (24.3-36.9)  H  01/05/21  07:02    


 


Sodium  143 mmol/L (136-145)   01/08/21  05:51    


 


Potassium  3.9 mmol/L (3.5-5.1)   01/08/21  05:51    


 


BUN  22 mg/dL (7-18)  H  01/08/21  05:51    


 


Creatinine  1.15 mg/dL (0.55-1.3)   01/08/21  05:51    


 


Glucose  90 mg/dL ()   01/08/21  05:51    


 


Phosphorus  2.9 mg/dL (2.5-4.9)   01/02/21  17:00    


 


Magnesium  1.9 mg/dL (1.8-2.4)   01/08/21  05:51    


 


Total Bilirubin  0.4 mg/dL (0.2-1.0)   01/02/21  10:47    


 


AST  24 U/L (15-37)   01/02/21  10:47    


 


ALT  12 U/L (12-78)   01/02/21  10:47    


 


Alkaline Phosphatase  43 U/L ()  L  01/02/21  10:47    


 


Troponin I  0.12 ng/mL (0.0-0.045)  H  01/03/21  09:06    


 


Triglycerides  122 mg/dL (<150)   01/02/21  17:00    


 


Cholesterol  143 mg/dL (<200)   01/02/21  17:00    


 


HDL Cholesterol  45 mg/dL (40-60)   01/02/21  17:00    


 


Cholesterol/HDL Ratio  3.18   01/02/21  17:00    








Home Medications: 








Aspirin [Aspirin EC 81 MG] 1 tab PO DAILY 06/25/14 


Amox/K Clav [Augmentin 600 MG/5 ML Susp] 7.5 ml PO BID 10 Days #150 ml 01/08/21 


Clopidogrel Bisulfate [Plavix*] 75 mg PO DAILY 30 Days #30 tablet 01/08/21 


Docusate [Colace Cap*] 100 mg PO BID 30 Days #60 cap 01/08/21 


Ensure Enlive 237 ml PO BID  can 01/08/21 


Metoprolol Tartrate [Lopressor*] 1 tab PO BID 30 Days #60 tab 01/08/21 


Nitrofurantoin Monohyd/M-Cryst [Macrobid 100 mg Capsule] 100 mg PO DAILY 30 Days

#30 capsule 01/08/21 


Nitroglycerin [Nitrostat*] 0.4 mg SL UD PRN 30 Days #60 tab 01/08/21 


Ranolazine [Ranolazine ER] 500 mg PO BID 30 Days #60 tab.er.12h 01/08/21 


Senosides [Senokot*] 17.2 mg PO BID  tab 01/08/21 





New Medications: 


Amox/K Clav [Augmentin 600 MG/5 ML Susp] 7.5 ml PO BID 10 Days #150 ml


Docusate [Colace Cap*] 100 mg PO BID 30 Days #60 cap


Metoprolol Tartrate [Lopressor*] 1 tab PO BID 30 Days #60 tab


Nitrofurantoin Monohyd/M-Cryst [Macrobid 100 mg Capsule] 100 mg PO DAILY 30 Days

#30 capsule


Nitroglycerin [Nitrostat*] 0.4 mg SL UD PRN 30 Days #60 tab


 PRN Reason: Pain Scale 2-4 (Mild)


Clopidogrel Bisulfate [Plavix*] 75 mg PO DAILY 30 Days #30 tablet


Ranolazine [Ranolazine ER] 500 mg PO BID 30 Days #60 tab.er.12h


Patient Discharge Instructions: you were found to have blockage of your coronary

artery and required a stent placement. You were also found to have a UTI. You 

are discharged on several medications, aspirin, plavix, metoprolol. For your UTI

you will take augmentin liquid twice a day for 10 days, then take Macrobid 

(nitrofurantoin) once a day. You will need to follow up with Urology (Dr. Almodovar) as soon as possible. You will need to follow up with Cardiology (Dr. Wyman) in the next few weeks. Your cervical cancer was noted on the CT scan. 

It is strongly recommended you follow up with oncology in the next few weeks as 

well as discussed.


Diet: AHA


Activity: Ad lani


Followup: 


Eloy Wyman MD [ACTIVE - CAN ADMIT] -  (Follow up in office. Call to 

schedule an appointment. )


Moncho Almodovar [COURTESY - CAN ADMIT] - 1-2 Weeks (Follow up in office within 1 

week. Call to schedule an appointment. )

## 2021-01-08 NOTE — RAD REPORT
EXAM DESCRIPTION:  CT - [facility] Protocol - 1/7/2021 10:39 pm

 

CLINICAL HISTORY:  Flank pain.

hematuria, h/o staghorn calculi, recurrent UTI

 

COMPARISON:  [facility] Protocol dated 12/9/2020

 

TECHNIQUE:  Axial images were obtained without oral or IV contrast. Lack of contrast limits solid org
an and vascular assessment. The field-of-view spans the entirety of the  system partially obscuring
 uppermost abdomen and lung bases. Coronal reformatted images were obtained and reviewed.

 

All CT scans are performed using dose optimization technique as appropriate and may include automated
 exposure control or mA/KV adjustment according to patient size.

 

FINDINGS:  Small bilateral pleural effusions are present.

 

Small amount of fluid is seen in the upper abdomen surrounding the hepatic edge.Cholelithiasis. No ag
gressive liver or splenic lesion. The pancreas and adrenal glands are normal.

 

Large right-sided staghorn calculus again seen, unchanged since comparative study. Mild right hydrone
phrosis and hydroureter is present. Previously noted calcifications in the region of the distal right
 ureter are no longer seen. Tiny calcification is persists in the region of the urinary bladder.

 

Numerous punctate calcifications are seen in the calyx of the left kidney with moderate left hydronep
hrosis and hydroureter.

 

Cervical mass is seen with probable invasion of the urinary bladder. Small air bubble is present in t
he urinary bladder. 4 cm metastatic adenopathy again seen right pelvis with evidence of bony erosion 
near the right SI joint. Soft tissue inflammatory changes are also seen in the right groin.

 

IMPRESSION:  Large staghorn calculus involving the right kidney again noted. Punctate left nephrolith
iasis also seen.

 

Punctate air bubble in the urinary bladder may be related to infection.

 

Moderate bilateral hydronephrosis is present. This is likely related to malignant cervical mass invad
ing the urinary bladder. Metastatic adenopathy is seen in the right pelvis with slight bony erosion o
f the right SI joint seen on today's study.

 

Cholelithiasis.

 

Small bilateral pleural effusions.

## 2021-01-11 ENCOUNTER — HOSPITAL ENCOUNTER (INPATIENT)
Dept: HOSPITAL 97 - ER | Age: 76
LOS: 4 days | Discharge: NURSING, REHAB OR LONG-TERM CARE | DRG: 280 | End: 2021-01-15
Attending: HOSPITALIST | Admitting: INTERNAL MEDICINE
Payer: COMMERCIAL

## 2021-01-11 VITALS — BODY MASS INDEX: 17.4 KG/M2

## 2021-01-11 DIAGNOSIS — I25.2: ICD-10-CM

## 2021-01-11 DIAGNOSIS — E78.5: ICD-10-CM

## 2021-01-11 DIAGNOSIS — Z86.718: ICD-10-CM

## 2021-01-11 DIAGNOSIS — E87.0: ICD-10-CM

## 2021-01-11 DIAGNOSIS — E87.6: ICD-10-CM

## 2021-01-11 DIAGNOSIS — I21.4: ICD-10-CM

## 2021-01-11 DIAGNOSIS — N17.0: ICD-10-CM

## 2021-01-11 DIAGNOSIS — I25.10: ICD-10-CM
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
## 2021-01-11 DIAGNOSIS — Z95.5: ICD-10-CM

## 2021-01-11 DIAGNOSIS — D72.829: ICD-10-CM

## 2021-01-11 DIAGNOSIS — D63.1: ICD-10-CM

## 2021-01-11 DIAGNOSIS — C53.9: ICD-10-CM

## 2021-01-11 DIAGNOSIS — Z20.822: ICD-10-CM

## 2021-01-11 DIAGNOSIS — Z79.82: ICD-10-CM

## 2021-01-11 DIAGNOSIS — Z85.41: ICD-10-CM

## 2021-01-11 DIAGNOSIS — C79.51: ICD-10-CM

## 2021-01-11 DIAGNOSIS — I12.9: ICD-10-CM

## 2021-01-11 DIAGNOSIS — F41.9: ICD-10-CM

## 2021-01-11 DIAGNOSIS — I82.411: Primary | ICD-10-CM

## 2021-01-11 DIAGNOSIS — N13.6: ICD-10-CM

## 2021-01-11 DIAGNOSIS — Z88.1: ICD-10-CM

## 2021-01-11 DIAGNOSIS — Z79.02: ICD-10-CM

## 2021-01-11 DIAGNOSIS — N18.30: ICD-10-CM

## 2021-01-11 DIAGNOSIS — Z79.899: ICD-10-CM

## 2021-01-11 LAB
ALBUMIN SERPL BCP-MCNC: 3 G/DL (ref 3.4–5)
ALP SERPL-CCNC: 55 U/L (ref 45–117)
ALT SERPL W P-5'-P-CCNC: 12 U/L (ref 12–78)
AST SERPL W P-5'-P-CCNC: 26 U/L (ref 15–37)
BUN BLD-MCNC: 29 MG/DL (ref 7–18)
GLUCOSE SERPLBLD-MCNC: 118 MG/DL (ref 74–106)
HCT VFR BLD CALC: 34.6 % (ref 36–45)
INR BLD: 1.1
LYMPHOCYTES # SPEC AUTO: 0.9 K/UL (ref 0.7–4.9)
MORPHOLOGY BLD-IMP: (no result)
PMV BLD: 8.3 FL (ref 7.6–11.3)
POTASSIUM SERPL-SCNC: 3 MMOL/L (ref 3.5–5.1)
RBC # BLD: 3.87 M/UL (ref 3.86–4.86)

## 2021-01-11 PROCEDURE — 81240 F2 GENE: CPT

## 2021-01-11 PROCEDURE — 80069 RENAL FUNCTION PANEL: CPT

## 2021-01-11 PROCEDURE — 93971 EXTREMITY STUDY: CPT

## 2021-01-11 PROCEDURE — 97161 PT EVAL LOW COMPLEX 20 MIN: CPT

## 2021-01-11 PROCEDURE — 83540 ASSAY OF IRON: CPT

## 2021-01-11 PROCEDURE — 85302 CLOT INHIBIT PROT C ANTIGEN: CPT

## 2021-01-11 PROCEDURE — 86317 IMMUNOASSAY INFECTIOUS AGENT: CPT

## 2021-01-11 PROCEDURE — 81241 F5 GENE: CPT

## 2021-01-11 PROCEDURE — 87088 URINE BACTERIA CULTURE: CPT

## 2021-01-11 PROCEDURE — 97112 NEUROMUSCULAR REEDUCATION: CPT

## 2021-01-11 PROCEDURE — 82728 ASSAY OF FERRITIN: CPT

## 2021-01-11 PROCEDURE — 82746 ASSAY OF FOLIC ACID SERUM: CPT

## 2021-01-11 PROCEDURE — 87389 HIV-1 AG W/HIV-1&-2 AB AG IA: CPT

## 2021-01-11 PROCEDURE — 93005 ELECTROCARDIOGRAM TRACING: CPT

## 2021-01-11 PROCEDURE — 87086 URINE CULTURE/COLONY COUNT: CPT

## 2021-01-11 PROCEDURE — 97116 GAIT TRAINING THERAPY: CPT

## 2021-01-11 PROCEDURE — 86160 COMPLEMENT ANTIGEN: CPT

## 2021-01-11 PROCEDURE — 84165 PROTEIN E-PHORESIS SERUM: CPT

## 2021-01-11 PROCEDURE — 80053 COMPREHEN METABOLIC PANEL: CPT

## 2021-01-11 PROCEDURE — 87522 HEPATITIS C REVRS TRNSCRPJ: CPT

## 2021-01-11 PROCEDURE — 84156 ASSAY OF PROTEIN URINE: CPT

## 2021-01-11 PROCEDURE — 36415 COLL VENOUS BLD VENIPUNCTURE: CPT

## 2021-01-11 PROCEDURE — 87340 HEPATITIS B SURFACE AG IA: CPT

## 2021-01-11 PROCEDURE — 97530 THERAPEUTIC ACTIVITIES: CPT

## 2021-01-11 PROCEDURE — 86038 ANTINUCLEAR ANTIBODIES: CPT

## 2021-01-11 PROCEDURE — 82570 ASSAY OF URINE CREATININE: CPT

## 2021-01-11 PROCEDURE — 85025 COMPLETE CBC W/AUTO DIFF WBC: CPT

## 2021-01-11 PROCEDURE — 83520 IMMUNOASSAY QUANT NOS NONAB: CPT

## 2021-01-11 PROCEDURE — 83735 ASSAY OF MAGNESIUM: CPT

## 2021-01-11 PROCEDURE — 96372 THER/PROPH/DIAG INJ SC/IM: CPT

## 2021-01-11 PROCEDURE — 76377 3D RENDER W/INTRP POSTPROCES: CPT

## 2021-01-11 PROCEDURE — 74176 CT ABD & PELVIS W/O CONTRAST: CPT

## 2021-01-11 PROCEDURE — 81001 URINALYSIS AUTO W/SCOPE: CPT

## 2021-01-11 PROCEDURE — 85610 PROTHROMBIN TIME: CPT

## 2021-01-11 PROCEDURE — 76770 US EXAM ABDO BACK WALL COMP: CPT

## 2021-01-11 PROCEDURE — 99285 EMERGENCY DEPT VISIT HI MDM: CPT

## 2021-01-11 PROCEDURE — 86706 HEP B SURFACE ANTIBODY: CPT

## 2021-01-11 PROCEDURE — 86021 WBC ANTIBODY IDENTIFICATION: CPT

## 2021-01-11 PROCEDURE — 84466 ASSAY OF TRANSFERRIN: CPT

## 2021-01-11 PROCEDURE — 82607 VITAMIN B-12: CPT

## 2021-01-11 PROCEDURE — 86225 DNA ANTIBODY NATIVE: CPT

## 2021-01-11 RX ADMIN — APIXABAN SCH: 5 TABLET, FILM COATED ORAL at 21:00

## 2021-01-11 RX ADMIN — Medication SCH: at 21:00

## 2021-01-11 RX ADMIN — DEXTROSE AND SODIUM CHLORIDE SCH MLS: 5; .9 INJECTION, SOLUTION INTRAVENOUS at 22:00

## 2021-01-11 NOTE — P.HP
Certification for Inpatient


Patient admitted to: Observation


With expected LOS: <2 Midnights


Patient will require the following post-hospital care: None


Practitioner: I am a practitioner with admitting privileges, knowledge of 

patient current condition, hospital course, and medical plan of care.


Services: Services provided to patient in accordance with Admission requirements

found in Title 42 Section 412.3 of the Code of Federal Regulations





Patient History


Date of Service: 01/11/21


Reason for admission: Right leg swelling


History of Present Illness: 





75-year-old female past medical history of HTN, CAD status post CABG with recent

PCI with stenting 5 days ago and started on Plavix in addition to aspirin.  

Patient presented because of persistent her worsening right lower extremity 

swelling since her last hospitalization. Swelling got worse to affecting 

ambulation . she fell today at the store and was unable to get up wihtout 

assitance due to the swelling . She has her carduiac cath puente via the right 

femoral access and complicated immediately with extensive ecchymosis which has 

now resolved . She  denies any chest pain, she denies any recurrent shortness of

breath.  On admission he had Doppler ultrasound of the lower extremity shows 

evidence of femoral DVT.  She has been admitted for anticoagulation initiation. 

She has been adherent with aspirin and Plavix.  Of note patient has been having 

a recurrent Enterococcus faecalis UTI since the last 2 months.  She was recently

started on Augmentin as well as nitrofurantoin for mixed gaye on last culture 4

days ago 


She recalls being told she has cervical cancer 10 years but oopted for herbal 

remedy , she has not been seen by GYN since then . She is willing to be 

evaluated now and have lesion removed .Recent CT abdomen imaging from 4 days ago

shows extensive cervical mass with adenopathy and b/l hydronephrosis as well as 

bladder invasion of lesion .


Allergies





Sulfa (Sulfonamide Antibiotics) [Sulfa(Sulfonamide Antibiotics)] Adverse 

Reaction (Verified 01/11/21 23:00)


   anemia





Home Medications: 








Aspirin [Aspirin EC 81 MG] 1 tab PO DAILY 06/25/14 


Amox/K Clav [Augmentin 600 MG/5 ML Susp] 7.5 ml PO BID 10 Days #150 ml 01/08/21 


Clopidogrel Bisulfate [Plavix*] 75 mg PO DAILY 30 Days #30 tablet 01/08/21 


Docusate [Colace Cap*] 100 mg PO BID 30 Days #60 cap 01/08/21 


Ensure Enlive 237 ml PO BID  can 01/08/21 


Metoprolol Tartrate [Lopressor*] 1 tab PO BID 30 Days #60 tab 01/08/21 


Nitrofurantoin Monohyd/M-Cryst [Macrobid 100 mg Capsule] 100 mg PO DAILY 30 Days

#30 capsule 01/08/21 


Nitroglycerin [Nitrostat*] 0.4 mg SL UD PRN 30 Days #60 tab 01/08/21 


Ranolazine [Ranolazine ER] 500 mg PO BID 30 Days #60 tab.er.12h 01/08/21 


Senosides [Senokot*] 17.2 mg PO BID  tab 01/08/21 








- Past Medical/Surgical History


Diabetic: No


-: HTN


-: CAD with history of MI


-: MI


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


-: Cervical Cancer, [facility] [place]


-: CABG


-: TONSILLECTOMY


-: CATARACTS BL SX


-: D&C X2


-: BROKEN  NOSE REPAIR


Psychosocial/ Personal History: .





- Family History


  ** Mother


-: Heart disease





  ** Brother


-: Stroke





- Social History


Alcohol use: No


CD- Drugs: No


Caffeine use: No





Review of Systems


10-point ROS is otherwise unremarkable





Physical Examination





- Physical Exam


General: Alert, In no apparent distress, Oriented x3


HEENT: Atraumatic, Normocephalic, PERRLA


Neck: Supple, 2+ carotid pulse no bruit, JVD not distended


Respiratory: Clear to auscultation bilaterally, Normal air movement, Diminished


Cardiovascular: No edema, Normal pulses, Regular rate/rhythm


Capillary refill: <2 Seconds


Gastrointestinal: Normal bowel sounds, Soft and benign, Non-distended, No 

ascites


Integumentary: No rashes, No breakdown, Tenderness/swelling


Neurological: Normal gait, Normal speech, Normal strength at 5/5 x4 extr





- Studies


Laboratory Data (last 24 hrs)





01/11/21 19:10: PT 12.9 H, INR 1.10


01/11/21 19:10: Sodium 146 H, Potassium 3.0 L, BUN 29 H, Creatinine 1.30, 

Glucose 118 H, Total Bilirubin 0.4, AST 26, ALT 12, Alkaline Phosphatase 55


01/11/21 19:10: WBC 15.8 H, Hgb 11.1 L, Hct 34.6 L D, Plt Count 328  D








Assessment and Plan





- Problems (Diagnosis)


(1) History of DVT (deep vein thrombosis)


Current Visit: No   Status: Acute   





(2) History of coronary artery bypass graft


Current Visit: No   Status: Acute   





(3) Hypertension


Current Visit: No   Status: Acute   





(4) Hypokalemia


Current Visit: No   Status: Acute   


Discharge Plan: Home


Plan to discharge in: 24 Hours





- Advance Directives


Does patient have a Living Will: No


Does patient have a Durable POA for Healthcare: No


Physician Review: Patient Assessed, Agree with Above Assessment and Plan


Physician Review Additional Text: 





Right lower extremity DVT -will obtain factor V and prothrombin level


PT and INR within normal range


Will initiate anticoagulation with Eliquis 10 mg b.i.d. for 7 days then  5 mg 

b.i.d.


Continue aspirin and Plavix


Prior history of underlying cervical cancer noted , will refer to Gynecology as 

outpatient for evaluation 


We need anticoagulation for the next 3-6 months since unprovoked DVT


Obtain stool for occult blood





# Leucocytosis - persistent since prior admission , may be due to recurrent UTI 


- obtain repeat UA 


-will start empirical levaquiin based on prior cx result 


-follow trend 





#HTN-controlled, continue metoprolol 





# Hypokalemia/hypernatremia-start gentle  hypotonic IVF with KCl





#DVT prophylaxis-as above





#Recent acute coronary syndrome-continue aspirin and Plavix as well as 

Ranolazine 





# Cervical cancer  - with bilateral hydronephrosis  and bladder invasion - 

consider transfer to higher center after  Gynecology eval





# Asthenia - obtain PT/ot





#Disposition-possible hospital stay in 1-2 days








#Advanced directive-with leave as full code


Time Spent Managing Pts Care (In Minutes): 65

## 2021-01-11 NOTE — RAD REPORT
EXAM DESCRIPTION:  US - Extremity Venous Uni Ltd - 1/11/2021 7:39 pm

 

CLINICAL HISTORY:  SWELLING

Leg swelling and edema.

 

COMPARISON:  Extremity Venous Uni Ltd dated 1/2/2021; Lower Extremity Artery Uni Ltd dated 11/13/2020
; Extremity Venous Uni Ltd dated 11/13/2020

 

FINDINGS:  Right lower extremity venous system was interrogated with Doppler technique.

 

Since the prior study dated 01/02/2021, there is diminished venous flow within the right femoral vein
 with echogenic material present. The vein is also not completely compressible. This finding is suspi
cious for recurrent DVT in the femoral vein.The remainder of the deep venous system shows no evidence
 of DVT.

 

IMPRESSION:  Findings are suspicious for a recurrent DVT in the right femoral vein.

## 2021-01-11 NOTE — EDPHYS
Physician Documentation                                                                           

 [facility]                                                                 

Name: Va Hughes                                                                              

Age: 75 yrs                                                                                       

Sex: Female                                                                                       

: 1945                                                                                   

MRN: [id number]                                                                                   

Arrival Date: 2021                                                                          

Time: 18:33                                                                                       

Account#: [id number]                                                                            

Bed 13                                                                                            

Private MD:                                                                                       

ED Physician Jax Glasgow                                                                       

HPI:                                                                                              

                                                                                             

18:48 This 75 yrs old  Female presents to ER via EMS with complaints of Right leg    pm1 

      swelling and pain.                                                                          

18:48 The patient presents with pain, swelling. The complaints affect the right leg. Context: pm1 

      The problem was sustained at home, resulted from an unknown cause, the patient can          

      fully bear weight, the patient is able to ambulate, prior history of DVT. Onset: The        

      symptoms/episode began/occurred 2 day(s) ago. Modifying factors: The symptoms are           

      alleviated by nothing. the symptoms are aggravated by movement and bending knee.            

      Associated signs and symptoms: Pertinent positives: Pain to right hamstring area.           

      Swelling to right leg below knee, Pertinent negatives calf tenderness, fever, vomiting,     

      chest pain, shortness of breath. Treatment prior to arrival includes: no previous           

      treatment. Severity of symptoms: in the emergency department the symptoms have              

      improved, pain is no longer present to right leg. The patient has experienced similar       

      episodes in the past, history of DVT in the past. The patient has been recently been        

      admitted at [facility], Patient was admitted for NSTEMI on           

      2021. Stents placed and patient also treated for UTI and constipation. Discharged       

      home with Augmentin, Macrobid, Plavix, nitro, Ranexa, and laxatives on 2021.            

                                                                                                  

Historical:                                                                                       

- Allergies:                                                                                      

18:50 Sulfa (Sulfonamide Antibiotics);                                                        ph  

- PMHx:                                                                                           

18:50 Hypertension; Myocardial infarction;                                                    ph  

- PSHx:                                                                                           

18:50 CABG; cardiac stents;                                                                   ph  

                                                                                                  

- Immunization history:: Adult Immunizations unknown.                                             

- Social history:: Smoking status: Patient denies any tobacco usage or history of.                

                                                                                                  

                                                                                                  

ROS:                                                                                              

18:48 Constitutional: Negative for fever, chills, and weight loss.                            pm1 

18:48 Respiratory: Negative for shortness of breath, cough, wheezing, and pleuritic chest         

      pain, Abdomen/GI: Negative for abdominal pain, nausea, vomiting, diarrhea, and              

      constipation, Back: Negative for injury and pain, : Negative for injury, bleeding,        

      discharge, and swelling, MS/Extremity: Negative for injury and deformity, Skin:             

      Negative for injury, rash, and discoloration, Neuro: Negative for headache, weakness,       

      numbness, tingling, and seizure.                                                            

18:48 Cardiovascular: Positive for right lower extremity swelling and pain to right hamstring     

      area, Negative for chest pain, palpitations.                                                

                                                                                                  

Exam:                                                                                             

18:53 Constitutional:  This is a well developed, well nourished patient who is awake, alert,  pm1 

      and in no acute distress. Head/Face:  Normocephalic, atraumatic.                            

18:53 Back:  No spinal tenderness.  No costovertebral tenderness.  Full range of motion.          

18:53 Cardiovascular: Exam negative for  acute changes, Rate: normal, Rhythm: regular,            

      Pulses: no pulse deficits are appreciated.                                                  

18:53 Respiratory: Exam negative for  acute changes, respiratory distress, shortness of           

      breath.                                                                                     

18:53 Abdomen/GI: Inspection: abdomen appears normal, Palpation: abdomen is soft and              

      non-tender, in all quadrants.                                                               

18:53 Musculoskeletal/extremity: DVT Exam: no pain, no tenderness, no erythema, no increased      

      warmth, swelling.                                                                           

18:53 Skin: Appearance: normal except for affected area, ecchymosis, noted on the, medial         

      aspect of right thigh, that are mild.                                                       

18:53 Neuro: Exam negative for acute changes, Orientation: is normal, Mentation: is normal,       

      Motor: is normal, moves all fours, strength is normal, strength is 5/5 in all               

      extremities, Sensation: is normal, no obvious gross deficits.                               

                                                                                                  

Vital Signs:                                                                                      

18:47  / 79; Pulse 63; Resp 18; Temp 97.9; Pulse Ox 100% on R/A; Weight 43.09 kg;       ph  

19:22  / 85; Pulse 59; Resp 18; Pulse Ox 100% on R/A;                                   mg2 

20:30  / 71; Pulse 61; Resp 18; Pulse Ox 100% on R/A;                                   mg2 

21:30  / 78; Pulse 71; Resp 18; Pulse Ox 100% on R/A;                                   mg2 

                                                                                                  

MDM:                                                                                              

18:38 Patient medically screened.                                                             pm1 

20:30 Data reviewed: vital signs. Data interpreted: Pulse oximetry: on room air is 100 %.     pm1 

      Interpretation: normal. Counseling: I had a detailed discussion with the patient and/or     

      guardian regarding: the historical points, exam findings, and any diagnostic results        

      supporting the discharge/admit diagnosis, lab results, radiology results, the need for      

      further work-up and treatment in the hospital.                                              

20:38 Physician consultation: Salma Washington MD was called at 20:38, was contacted at 20:38,  pm1 

      regarding admission, patient's condition, and will see patient would like medications       

      started, Lovenox, 1mg/kg.                                                                   

                                                                                                  

                                                                                             

18:48 Order name: CBC with Diff; Complete Time: 21:08                                         pm1 

                                                                                             

18:48 Order name: CMP; Complete Time: 19:51                                                   pm1 

                                                                                             

18:48 Order name: PT-INR; Complete Time: 19:51                                                pm1 

                                                                                             

20:32 Order name: Manual Differential; Complete Time: 21:08                                   [facility]

                                                                                             

21:07 Order name: Factor V Leiden Mutation                                                    [facility]

                                                                                             

21:07 Order name: PROTHROMBIN GENE ANALYSIS (F2)                                              [facility]

                                                                                             

18:38 Order name: Extremity Venous Uni Ltd US; Complete Time: 20:19                           pm1 

                                                                                             

21:55 Order name: COVID-19                                                                    2 

                                                                                             

22:00 Order name: Magnesium; Complete Time: 22:04                                             [facility]

                                                                                             

22:36 Order name: CORONAVIRUS                                                                 [facility]

                                                                                             

23:44 Order name: SARS-COV-2 RT PCR; Complete Time: 23:49                                     [facility]

                                                                                             

05:08 Order name: CBC with Automated Diff                                                     [facility]

                                                                                             

05:10 Order name: Comprehensive Metabolic Panel                                               [facility]

                                                                                             

21:07 Order name: CONS Pharmacy Consult                                                       [facility]

                                                                                             

21:07 Order name: CONS Physician Consult                                                      [facility]

                                                                                             

21:07 Order name: Heart Healthy                                                               [facility]

                                                                                                  

Administered Medications:                                                                         

20:27 Drug: Potassium Effervescent Tablet 50 mEq Route: PO;                                   mg2 

20:40 Follow up: Response: No adverse reaction                                                mg2 

20:45 Drug: Lovenox 1 mg/kg Route: Sub-Q; Site: right lower abdomen;                          mg2 

22:16 Follow up: Response: No adverse reaction                                                mg2 

21:15 Drug: Metoprolol TARTRATE (Lopressor) 50 mg Route: PO;                                  mg2 

                                                                                             

03:13 Follow up: Response: No adverse reaction                                                mg2 

                                                                                                  

                                                                                                  

Disposition:                                                                                      

                                                                                             

10:30 Co-signature as Attending Physician, Jax Glasgow MD I agree with the assessment and   kdr 

      plan of care.                                                                               

                                                                                                  

Disposition:                                                                                      

21 20:32 Hospitalization ordered by Salma Washington for Observation. Preliminary            

  diagnosis is Acute embolism and thrombosis of other specified deep vein of                      

  right lower extremity.                                                                          

- Bed requested for Telemetry/MedSurg (observation).                                              

- Status is Observation.                                                                      tw2 

- Condition is Stable.                                                                            

- Problem is new.                                                                                 

- Symptoms have improved.                                                                         

                                                                                                  

                                                                                                  

                                                                                                  

Signatures:                                                                                       

Dispatcher [employer]                           EDMS                                                 

Emmanuelle suarez                              bd                                                   

Jax Glasgow MD MD   [facility]                                                  

Maria Esther Panchal, RN                      RN                                                      

Ashwini Blevins, RN                       RN   cg                                                   

Clinton Alcala, NP                    NP   pm1                                                  

Gayla Herrera RN                          RN   tw2                                                  

Didier Bernal, RN                    RN   mg2                                                  

                                                                                                  

Corrections: (The following items were deleted from the chart)                                    

                                                                                             

21:15 20:32 Hospitalization Ordered by Salma Washington MD for Observation. Preliminary         cg  

      diagnosis is Acute embolism and thrombosis of other specified deep vein of right lower      

      extremity. Bed requested for Telemetry/MedSurg (observation). Status is Observation.        

      Condition is Stable. Problem is new. Symptoms have improved. pm1                            

                                                                                             

16:28  21:15 2021 20:32 Hospitalization Ordered by Salma Washington MD for           bd  

      Observation. Preliminary diagnosis is Acute embolism and thrombosis of other specified      

      deep vein of right lower extremity. Bed requested for [facility] ER HOLD. Status is               

      Observation. Condition is Stable. Problem is new. Symptoms have improved. cg                

                                                                                             

17:05 16:28 2021 20:32 Hospitalization Ordered by Salma Washington MD for Observation.    tw2 

      Preliminary diagnosis is Acute embolism and thrombosis of other specified deep vein of      

      right lower extremity. Bed requested for Telemetry/MedSurg (observation). Status is         

      Observation. Condition is Stable. Problem is new. Symptoms have improved. bd                

                                                                                                  

**************************************************************************************************

## 2021-01-11 NOTE — ER
Nurse's Notes                                                                                     

 [facility]                                                                 

Name: Va Hughes                                                                              

Age: 75 yrs                                                                                       

Sex: Female                                                                                       

: 1945                                                                                   

MRN: [id number]                                                                                   

Arrival Date: 2021                                                                          

Time: 18:33                                                                                       

Account#: [id number]                                                                            

Bed 13                                                                                            

Private MD:                                                                                       

Diagnosis: Acute embolism and thrombosis of other specified deep vein of right lower extremity    

                                                                                                  

Presentation:                                                                                     

                                                                                             

18:47 Chief complaint: EMS states: Pt has recent hx of MI, cardiac cath and stent placement,  ph  

      reports R leg swelling and heaviness, BP elevated en route, 180s/90s, pt denies chest       

      pain or SOB. Coronavirus screen: Client denies travel out of the [place] in the last 14        

      days. At this time, the client does not indicate any symptoms associated with               

      coronavirus-19. Ebola Screen: No symptoms or risks identified at this time. Initial         

      Sepsis Screen: Does the patient meet any 2 criteria? No. Patient's initial sepsis           

      screen is negative. Does the patient have a suspected source of infection? No.              

      Patient's initial sepsis screen is negative. Risk Assessment: Do you want to hurt           

      yourself or someone else? Patient reports no desire to harm self or others. Onset of        

      symptoms was 2021.                                                              

18:47 Method Of Arrival: EMS: [facility]                                                      

18:47 Acuity: EDMOND 3                                                                           ph  

                                                                                                  

Historical:                                                                                       

- Allergies:                                                                                      

18:50 Sulfa (Sulfonamide Antibiotics);                                                        ph  

- PMHx:                                                                                           

18:50 Hypertension; Myocardial infarction;                                                    ph  

- PSHx:                                                                                           

18:50 CABG; cardiac stents;                                                                   ph  

                                                                                                  

- Immunization history:: Adult Immunizations unknown.                                             

- Social history:: Smoking status: Patient denies any tobacco usage or history of.                

                                                                                                  

                                                                                                  

Screenin:51 Abuse screen: Denies threats or abuse. Denies injuries from another. Nutritional        ph  

      screening: No deficits noted. Tuberculosis screening: No symptoms or risk factors           

      identified. Fall Risk None identified.                                                      

                                                                                                  

Assessment:                                                                                       

18:51 General: Appears in no apparent distress. comfortable, slender, well groomed, Behavior  ph  

      is cooperative, appropriate for age, anxious, Denies fever, feeling ill. Pain:              

      Complains of pain in right leg. Neuro: Level of Consciousness is awake, alert, obeys        

      commands, Oriented to person, place, time, situation. Cardiovascular: Capillary refill      

      < 3 seconds in bilateral fingers Patient's skin is warm and dry. Cardiovascular: Edema      

      is 2+ to right midcalf, right ankle and right foot. Respiratory: Airway is patent           

      Respiratory effort is even, unlabored, Respiratory pattern is regular, symmetrical,         

      Denies shortness of breath. GI: No signs and/or symptoms were reported involving the        

      gastrointestinal system. Derm: Skin is intact, is fragile, is thin, Skin is pink, warm      

      \T\ dry. Musculoskeletal: Circulation, motion, and sensation intact. Range of motion:       

      intact in all extremities.                                                                  

21:00 Reassessment: Patient appears in no apparent distress at this time. Patient and/or      mg2 

      family updated on plan of care and expected duration. Pain level reassessed. Patient is     

      alert, oriented x 3, equal unlabored respirations, skin warm/dry/pink.                      

                                                                                                  

Vital Signs:                                                                                      

18:47  / 79; Pulse 63; Resp 18; Temp 97.9; Pulse Ox 100% on R/A; Weight 43.09 kg;       ph  

19:22  / 85; Pulse 59; Resp 18; Pulse Ox 100% on R/A;                                   mg2 

20:30  / 71; Pulse 61; Resp 18; Pulse Ox 100% on R/A;                                   mg2 

21:30  / 78; Pulse 71; Resp 18; Pulse Ox 100% on R/A;                                   mg2 

                                                                                                  

ED Course:                                                                                        

18:33 Patient arrived in ED.                                                                  ds1 

18:36 Jax Glasgow MD is Attending Physician.                                              kdr 

18:38 Clinton Alcala NP is PHCP.                                                           pm1 

18:47 Maria Esther Panchal RN is Primary Nurse.                                                    ph  

18:48 Triage completed.                                                                       ph  

18:51 Patient has correct armband on for positive identification. Bed in low position. Call   ph  

      light in reach. Side rails up X2. Cardiac monitor on. Pulse ox on. NIBP on. Door            

      closed. Noise minimized. Warm blanket given.                                                

18:51 Arm band placed on Patient placed in an exam room, on a stretcher, on cardiac monitor,  ph  

      on pulse oximetry.                                                                          

19:07 Primary Nurse role handed off by Maria Esther Panchal, GRACY                                     mw2 

19:10 Inserted saline lock: 20 gauge in left forearm, using aseptic technique. Blood          mg2 

      collected.                                                                                  

19:21 Didier Bernal, RN is Primary Nurse.                                                  mg2 

19:21 No provider procedures requiring assistance completed.                                  mg2 

19:39 Extremity Venous Uni Ltd US In Process Unspecified.                                     EDMS

20:32 Salma Washington MD is Hospitalizing Provider.                                          pm1 

21:15 Patient admitted, IV remains in place.                                                  mg2 

                                                                                             

06:35 Assisted to bedside commode.                                                            mw2 

07:05 Primary Nurse role handed off by Didier Bernal RN                                   bd  

                                                                                                  

Administered Medications:                                                                         

                                                                                             

20:27 Drug: Potassium Effervescent Tablet 50 mEq Route: PO;                                   mg2 

20:40 Follow up: Response: No adverse reaction                                                mg2 

20:45 Drug: Lovenox 1 mg/kg Route: Sub-Q; Site: right lower abdomen;                          mg2 

22:16 Follow up: Response: No adverse reaction                                                mg2 

21:15 Drug: Metoprolol TARTRATE (Lopressor) 50 mg Route: PO;                                  mg2 

                                                                                             

03:13 Follow up: Response: No adverse reaction                                                mg2 

                                                                                                  

                                                                                                  

Outcome:                                                                                          

                                                                                             

20:32 Decision to Hospitalize by Provider.                                                    pm1 

21:15 Admitted to ER Hold.  Please see [employer] for further documentation.                    mg2 

21:15 Condition: stable                                                                           

21:15 Instructed on                                                                               

                                                                                             

17:05 Patient left the ED.                                                                    tw2 

                                                                                                  

Signatures:                                                                                       

Dispatcher [employer]                           EDMS                                                 

Emmanuelle Franco                                                                                 

Jax Glasgow MD MD kdr Sanford, Demi                                ds1                                                  

Maria Esther Panchal RN                      RN   ph                                                   

Clinton Alcala NP                    NP   pm1                                                  

Gayla Herrera RN                          RN   tw2                                                  

Les Miranda                            mw2                                                  

Didier Bernal, GRACY                    RN   mg2                                                  

                                                                                                  

**************************************************************************************************

## 2021-01-11 NOTE — XMS REPORT
Continuity of Care Document

                           Created on:2021



Patient:FANTA MCCABE

Sex:Female

:1945

External Reference #:[id number]





Demographics







                          Address                   [address] [address]

 

                          Home Phone                [phone]

 

                          Work Phone                [phone]

 

                          Mobile Phone              [phone]

 

                          Email Address             [email or web address]

 

                          Preferred Language        English

 

                          Marital Status            Unknown

 

                          Shinto Affiliation     Unknown

 

                          Race                      Unknown

 

                          Additional Race(s)        Unavailable



                                                    Unavailable



                                                    White

 

                          Ethnic Group              Unknown









Author







                          Organization              [facility]

t

 

                          Address                   [address]



                                                    [place] [address]

 

                          Phone                     [phone]









Support







                Name            Relationship    Address         Phone

 

                Thanh (DAUGHTER) ESTELLE Child           [phone]





                                                [place] [address] 

 

                Luiz          Mother          Unavailable     [phone]

 

                Martinez (Brother)   Sibling         Unavailable     [phone]









Care Team Providers







                    Name                Role                Phone

 

                    CHRISTINA ALMAGUER         Primary Care Physician Unavailable

 

                    SYSTEM,  NOT IN     Attending Clinician Unavailable

 

                    Doctor Unassigned,  Name Attending Clinician Unavailable

 

                    Jose BAH       Attending Clinician [phone]

 

                    Dontae RN,  A       Attending Clinician Unavailable









Problems

This patient has no known problems.



Allergies, Adverse Reactions, Alerts

This patient has no known allergies or adverse reactions.



Medications

This patient has no known medications.



Procedures

This patient has no known procedures.



Encounters







        Start   End     Encounter Admission Attending Care    Care    Encounter 

Source



        Date/Time Date/Time Type    Type    Clinicians Facility Department ID   

   

 

        2020-10-07         Outpatient         SYSTEM, [facility]     [id number]

 MD



        10:40:33                         PROVIDER                         Carlos desir

 

        2020-10-23 2020-10-23 Orders          Doctor OATES    [id number]

26 



        00:00:00 00:00:00 Only            UnassFANG neville   [id number]       

  



                                        [facility] [id number]         



                                                        [phone]         



                                                        009             

 

        2020-10-09 2020-10-09 Letter          Jody Garcia  [id number]

[id number] 



        00:00:00 00:00:00 (Out)           Josh Alvarez   [id number]         



                                                [facility] [id number]         



                                                        [phone]         



                                                        091             

 

        2020-10-09 2020-10-09 Orders          Doctor  ИВАН    [id number]

34 



        00:00:00 00:00:00 Only            Unassigned, FANG   350.1.13.10       

  



                                        [facility] [id number]         



                                                        [phone]         



                                                        009             

 

        2020-10-06 2020-10-06 Telephone         JACLYN Garcia 1.2.840.11

[id number] 



        00:00:00 00:00:00                 JoshSatmetrix 350.1.13.10         



                                                [facility] [id number]         



                                                        [phone]         



                                                        096             

 

        2020-10-02 2020-10-02 Transition         Hyacinth Diggskarlee  [id number]

[id number] 



        00:00:00 00:00:00 of Care         Didier Kauffman   350.1.13.10         



                                                [place]   [id number]         



                                                        [phone]         



                                                        403             

 

        2020-10-02 2020-10-02 Telephone         JACLYN Garcia 1.2.840.11

4 [id number] 



        00:00:00 00:00:00                 JoshSatmetrix 350.1.13.10         



                                                [facility] [id number]         



                                                        [phone]         



                                                        096             







Results

This patient has no known results. Airway patent, Nasal mucosa clear. Mouth with normal mucosa. Throat has no vesicles, no oropharyngeal exudates and uvula is midline.

## 2021-01-12 LAB
ALBUMIN SERPL BCP-MCNC: 2.4 G/DL (ref 3.4–5)
ALP SERPL-CCNC: 46 U/L (ref 45–117)
ALT SERPL W P-5'-P-CCNC: 11 U/L (ref 12–78)
AST SERPL W P-5'-P-CCNC: 22 U/L (ref 15–37)
BUN BLD-MCNC: 25 MG/DL (ref 7–18)
GLUCOSE SERPLBLD-MCNC: 86 MG/DL (ref 74–106)
HCT VFR BLD CALC: 29.4 % (ref 36–45)
LYMPHOCYTES # SPEC AUTO: 1 K/UL (ref 0.7–4.9)
PMV BLD: 8.4 FL (ref 7.6–11.3)
POTASSIUM SERPL-SCNC: 3.7 MMOL/L (ref 3.5–5.1)
RBC # BLD: 3.3 M/UL (ref 3.86–4.86)

## 2021-01-12 RX ADMIN — APIXABAN SCH: 5 TABLET, FILM COATED ORAL at 08:14

## 2021-01-12 RX ADMIN — DOCUSATE SODIUM SCH MG: 100 CAPSULE, LIQUID FILLED ORAL at 20:51

## 2021-01-12 RX ADMIN — SENNOSIDES SCH MG: 8.6 TABLET, FILM COATED ORAL at 20:51

## 2021-01-12 RX ADMIN — Medication SCH ML: at 20:52

## 2021-01-12 RX ADMIN — METOPROLOL TARTRATE SCH MG: 50 TABLET, FILM COATED ORAL at 08:14

## 2021-01-12 RX ADMIN — ENOXAPARIN SODIUM SCH MG: 40 INJECTION SUBCUTANEOUS at 13:00

## 2021-01-12 RX ADMIN — SENNOSIDES SCH: 8.6 TABLET, FILM COATED ORAL at 21:00

## 2021-01-12 RX ADMIN — METOPROLOL TARTRATE SCH MG: 50 TABLET, FILM COATED ORAL at 20:52

## 2021-01-12 RX ADMIN — Medication SCH ML: at 08:15

## 2021-01-12 RX ADMIN — CLOPIDOGREL BISULFATE SCH MG: 75 TABLET, FILM COATED ORAL at 08:15

## 2021-01-12 RX ADMIN — DEXTROSE AND SODIUM CHLORIDE SCH MLS: 5; .9 INJECTION, SOLUTION INTRAVENOUS at 16:20

## 2021-01-12 RX ADMIN — DEXTROSE AND SODIUM CHLORIDE SCH MLS: 5; .9 INJECTION, SOLUTION INTRAVENOUS at 17:19

## 2021-01-12 RX ADMIN — ASPIRIN SCH MG: 81 TABLET, COATED ORAL at 08:14

## 2021-01-12 NOTE — CON
Date of Consultation:  2021



Reason For Consultation:  Elevated BUN and creatinine, staghorn stone.



History Of Present Illness:  This is a 75-year-old female with significant past 
medical history of hypertension, hyperlipidemia, coronary artery disease status 
post CABG last week, the patient was in her regular state of health after 
discharged from the hospital, started getting leg swelling without any shortness
of breath, any chest pain, came to the hospital.  Upon evaluation, found to have
DVT in the leg.  The patient also found to have elevation in BUN and creatinine.
 For that reason, we have been consulted.  The patient apparently had a staghorn
kidney stone, used to follow up with MD Crooks.  The patient had recurrent 
UTI.



Past Medical History:  Includes;

1.   Hypertension.

2.   Coronary artery disease status post PTCA.

3.   Staghorn calculi in the right kidney.

4.   Cervical cancer.



Past Surgical History:  Includes CABG, tonsillectomy, cataract, , nose 
repair.



Home Medications:  Include aspirin, Augmentin, Plavix,  metoprolol, Macrobid, 
nitroglycerin, Ranexa, sennoside.



Family History:  Positive for staghorn kidney stone and coronary artery disease,
CVA.



Social History:  Denied smoking.  Denied drinking.  Denied drugs abuse.



Review of Systems:

Head and Neck:  No red eye.  No ear pain. 

GI:  Has nausea.  Has abdominal pain. 

:  No polyuria.  No hematuria.  Has dysuria. 

GYN:  No vaginal discharge. 

Respiratory:  No shortness of breath. 

Cardiovascular:  Has leg swelling. 

Endocrine:  No polydipsia. 

Skin:  No rash. 

Musculoskeletal:  Low back pain.



Physical Examination:

Vital Signs:  Blood pressure 157/63, pulse of 68, afebrile. 

Chest:  Clear to auscultation. 

Heart:  S1, S2.  Regular. 

Abdomen:  Soft, nontender. 

Extremities:  +1 edema on the right. 

Neurologic:  Alert.  No focality.



Laboratory Data:  WBC 13.6, H and H 9.4/29.4, platelet 274.  Sodium 146, 
potassium 3.7, bicarb 35, BUN 25, creatinine 1.1, GFR 47 yesterday, creatinine 
1.3, GFR of 40, calcium 8.8.  Urinalysis still pending.



Current Medications:  Include albuterol, Levaquin, Lovenox, Tylenol, Ranexa, 
metoprolol, Zofran.



Assessment And Plan:  

1.   Chronic kidney disease with acute kidney injury secondary to prerenal, on 
the recovery phase.  I am going to continue the patient on gentle hydration and 
we will follow up.  Given the history of staghorn kidney, I am going to do renal
ultrasound to evaluate further kidney stone.  We will send for urinalysis and we
will follow up the patient.

2.   Staghorn with recurrent urinary tract infection.  I agree with Levaquin.  
We will follow up the urine culture.

3.   Deep vein thrombosis.  Continue current anticoagulation.  We will follow up
with the primary. 

4.   Coronary artery disease as by Cardiology.



Time spent discussing with the patient, face-to-face, using the translation, 
discussing with the staff and placing an order, discussing with over 
subspecialty and hospitalist 75 minutes.

TAWNY

DD:  2021 14:48:52   Voice ID:  [id number]

DT:  2021 18:11:03   Report ID:  [id number]

ARCHIE

## 2021-01-12 NOTE — CON
Date of Consultation:  01/12/2021



Reason For Consultation:  Deep venous thrombosis of the right femoral vein.



History Of Present Illness:  Ms. Hughes is a 75-year-old woman who just recently had hospital for no
n-ST elevation myocardial infarction.  She has had a history of CABG.  Catheterization showed occlude
d graft to the RCA, occluded graft to the OM, completely occluded RCA, left main disease, LIMA patent
 to the LAD.  She underwent an angioplasty and stent of the proximal and mid circumflex successfully.
  She did well from a cardiac standpoint.  She did have a pneumonia when she was in the hospital as w
ell.  She recently was discharged.  She comes back with a DVT of the right femoral vein.  She has a h
istory of hypertension, CAD, CABG stent.



Allergies:  SULFA.



Review of Systems:

Negative.



Social History:  Negative.



Family History:  Noncontributory.



Medications:  At home include aspirin, Plavix, Ranexa, metoprolol, and hydralazine.



Physical Examination:

Vital Signs:  She weighed 94 pounds.  Blood pressure is 150/63. 

HEENT:  Negative. 

Neck:  Supple with no bruit. 

Chest:  Clear. 

Cardiac:  Revealed a regular rhythm and rate.  No murmurs, gallops, or rubs. 

Abdomen:  Benign. 

Extremities:  Revealed no clubbing, cyanosis.  She did have edema bilaterally.



Diagnostic Data:  Her EKG is nonspecific.  Chest x-ray is negative.  White count of 13,000.  Creatini
ne is 1.13, hemoglobin 9.4.  Ultrasound of the leg showed a DVT of the right femoral vein.



Impression And Plan:  

1.Acute deep vein thrombosis of the right femoral vein.  I think Eliquis 10 mg b.i.d. has been start
ed.  She is also on aspirin and Plavix because of her recent stent.

2.Coronary artery disease, status post coronary artery bypass grafting and stent successful of the c
ircumflex proximal and mid.

3.Hypertension.  I will continue her regimen at home including Ranexa, metoprolol, and hydralazine. 
 Continue Eliquis but she can definitely continue her Eliquis regimen as an outpatient.  I will randy
nue to follow her.





NB/MARSHA

DD:  01/12/2021 06:23:23Voice ID:  [id number]

DT:  01/12/2021 07:08:19Report ID:  [id number]

## 2021-01-12 NOTE — RAD REPORT
EXAM DESCRIPTION:  US - Renal Ultrasound-Complete - 1/12/2021 4:47 pm

 

CLINICAL HISTORY:  . Chronic renal disease. Abdominal pain

 

COMPARISON:  January 7, 2021 cat scan

 

FINDINGS:  The right kidney measures 11 cm with a mildly increased echotexture. Staghorn right renal 
calculus

 

The left kidney measures 10 cm with a mildly increased echotexture. Small left renal calculi

 

Moderate bilateral hydronephrosis. A large cervical mass invades the bladder

 

IMPRESSION:  Large cervical mass which invades the bladder obstructs the ureters resulting in moderat
e bilateral hydronephrosis

 

Increased renal echotexture consistent with parenchymal disease

 

Staghorn right renal calculus

## 2021-01-13 LAB
ALBUMIN SERPL BCP-MCNC: 2.2 G/DL (ref 3.4–5)
BUN BLD-MCNC: 21 MG/DL (ref 7–18)
GLUCOSE SERPLBLD-MCNC: 87 MG/DL (ref 74–106)
POTASSIUM SERPL-SCNC: 3.9 MMOL/L (ref 3.5–5.1)
URINE UROTHELIAL CELLS: (no result) /HPF

## 2021-01-13 RX ADMIN — Medication SCH ML: at 21:15

## 2021-01-13 RX ADMIN — Medication SCH ML: at 08:14

## 2021-01-13 RX ADMIN — CLOPIDOGREL BISULFATE SCH MG: 75 TABLET, FILM COATED ORAL at 08:12

## 2021-01-13 RX ADMIN — DEXTROSE AND SODIUM CHLORIDE SCH MLS: 5; .9 INJECTION, SOLUTION INTRAVENOUS at 13:51

## 2021-01-13 RX ADMIN — DOCUSATE SODIUM SCH MG: 100 CAPSULE, LIQUID FILLED ORAL at 08:11

## 2021-01-13 RX ADMIN — Medication SCH: at 08:13

## 2021-01-13 RX ADMIN — ENOXAPARIN SODIUM SCH MG: 40 INJECTION SUBCUTANEOUS at 13:06

## 2021-01-13 RX ADMIN — SENNOSIDES SCH: 8.6 TABLET, FILM COATED ORAL at 08:13

## 2021-01-13 RX ADMIN — METOPROLOL TARTRATE SCH MG: 50 TABLET, FILM COATED ORAL at 08:12

## 2021-01-13 RX ADMIN — ASPIRIN SCH MG: 81 TABLET, COATED ORAL at 08:12

## 2021-01-13 RX ADMIN — DOCUSATE SODIUM SCH MG: 100 CAPSULE, LIQUID FILLED ORAL at 21:14

## 2021-01-13 RX ADMIN — METOPROLOL TARTRATE SCH MG: 50 TABLET, FILM COATED ORAL at 21:14

## 2021-01-13 RX ADMIN — SENNOSIDES SCH: 8.6 TABLET, FILM COATED ORAL at 21:00

## 2021-01-13 RX ADMIN — ENOXAPARIN SODIUM SCH MG: 40 INJECTION SUBCUTANEOUS at 00:56

## 2021-01-13 NOTE — PN
Date of Progress Note:  01/13/2021



Subjective:  The patient was admitted with acute kidney injury.  The patient had
history of staghorn.  The patient is not clear why she follow up with MD Crooks.  According to her, possible cervical cancer.  The patient workup show 
obstructive uropathy.



Physical Examination:

Vital Signs:  Blood pressure 160/77, pulse of 57, afebrile.  The patient had 
good urine output, still incontinent. 

Chest:  Clear to auscultation. 

Heart:  S1, S2 regular. 

Abdomen:  Soft nontender. 

Extremities:  No edema. 

Neuro:  Alert, oriented x3.



Laboratory Data:  WBC 13.6, H and H 9.4/29.4, platelets 274.  Sodium 146, 
potassium 3.9, bicarb 32, BUN 21, creatinine down to 1.2, GFR of 44, calcium 
8.4, phos of 3, albumin 2.2.  Urine analysis, wbc above 50.  PC ratio is 6 with 
disproportion as dipsticks only +2, but has 60 g.  Renal ultrasound showing 
11/10; moderate bilateral hydronephrosis, large cervical mass invading the 
bladder.



Assessment And Plan:  

1.   Acute kidney injury secondary to prerenal superimposed with obstructive 
uropathy with nephrotic range of proteinuria and disproportion in dipstick and 
the quantification.  I am going to continue hydration.  I am going to send for 
serum protein electrophoresis.  We will consult Urology and the patient is going
to need evaluation.  If urology decided that this is as outpatient procedure, 
the patient can be discharged to follow up as out patient.

2.   Urinary tract infection with staghorn stone on the right side and 
obstructive uropathy complicated UTI.  We will follow up with the primary.  
Continue current antibiotic.  We will follow up culture.

3.   Nephrotic range of proteinuria.  Our differential diagnosis #1 secondary to
FSGS obstructive uropathy.  Secondary to light chain disease supported with 
disproportion protein creatinine and anemia.  I am going to send for serum 
protein electrophoresis.  I am going to send for serology for further evaluation
with the presence of acute kidney injury.  I am going to avoid adding any ACE 
inhibitor or ARB.

4.   Anemia of chronic kidney disease, send for the workup and we will follow 
up.







Time spent discussing with the patient, face-to-face, using the translation, 
discussing with the staff and placing an order, discussing with over 
subspecialty and hospitalist 45 minutes.

TAWNY

DD:  01/13/2021 12:22:04   Voice ID:  [id number]

DT:  01/13/2021 13:11:57   Report ID:  [id number]

ARCHIE

## 2021-01-13 NOTE — RAD REPORT
EXAM DESCRIPTION:  CT - Stone Protocol - 1/13/2021 2:36 pm

 

CLINICAL HISTORY:  Obstructive uropathy

 

COMPARISON:  Stone Protocol dated 1/7/2021; Stone Protocol dated 12/9/2020

 

TECHNIQUE:  Axial 5 mm thick images were obtained without oral or IV contrast. The field-of-view span
s the entirety of the  system including uppermost abdomen and lung bases.

 

All CT scans are performed using dose optimization technique as appropriate and may include automated
 exposure control or mA/KV adjustment according to patient size.

 

FINDINGS:  Large right-sided staghorn calculus again noted. Moderate severity right-sided hydronephro
sis surrounds the staghorn similar in severity to comparison. Moderate severity right-sided hydrouret
er extends towards the pelvic inlet. Two 5 millimeter sized calcifications are present layering along
 the posterior wall of the ureter at the pelvic inlet. Moderately severe left-sided hydronephrosis an
d hydroureter present as well to at least the pelvic inlet level. Punctate calyx and parenchymal calc
valentin on the left again. . No suspicious solid mass of either kidney. No significant adrenal finding. U
rinary bladder is only partially filled. Several small calculi are present within the lumen. Air dens
ity is present within the lumen as well. This could be from catheterization attempts. Large bulky lob
ulated cervical mass noted. This causes extrinsic compression of the ureters. Invasion of the bladder
 is evident as well. These are all stable findings from January 7.

 

Imaged portions of the liver, spleen and pancreas show no suspicious findings on non-contrast imaging
. No gallbladder or biliary tree abnormality identified.

 

No suspicious bowel findings.

 

A 4-5 centimeter size mass is again noted along the inner table right pelvis at the SI joint level. E
morris bone destructive changes are present as previously noted. No new mass or bulky lymphadenopathy. 
Nonspecific periaortic lymph nodes are present. Small amount of free fluid is present in the dependen
t portion of the pelvis. No free air or pneumatosis.  Significant fluid retention in the subcutaneous
 fatty tissues. Small amount of free fluid adjacent to the liver.

 

No new bone finding.

 

IMPRESSION:  CT abdomen and pelvis imaging findings are stable from January 7. The large pelvic mass,
 right-sided pelvic malignant adenopathy, and bilateral hydronephrosis oral stable.

 

Full assessment including assessment of renal isodense massesand pyelonephritis is limited in the abs
ence of IV contrast.

## 2021-01-13 NOTE — P.PN
Subjective


Date of Service: 01/13/21


Chief Complaint: Right leg swelling


Patient has no new complain. She ambulated with PT today.  Patient tolerating 

Lovenox subcu.








Physical Examination





- Vital Signs


Temperature: 98.5 F


Blood Pressure: 146/67


Pulse: 60


Respirations: 16


Pulse Ox (%): 99





- Physical Exam


General: Alert, In no apparent distress


HEENT: Mucous membr. moist/pink


Neck: Supple, JVD not distended


Respiratory: Clear to auscultation bilaterally, Normal air movement


Cardiovascular: Regular rate/rhythm, Normal S1 S2, Edema (Right leg)


Gastrointestinal: Normal bowel sounds, Soft and benign, Non-distended


Musculoskeletal: No swelling


Integumentary: No rashes


Neurological: Other (No focal deficit.)





Assessment And Plan





- Current Problems (Diagnosis)


(1) DVT, recurrent, lower extremity, acute


Current Visit: Yes   Status: Acute   





(2) Bilateral hydronephrosis


Current Visit: Yes   Status: Acute   





(3) Cervical carcinoma


Current Visit: No   Status: Acute   





(4) Leukocytosis


Current Visit: No   Status: Acute   





(5) Staghorn calculus


Current Visit: No   Status: Acute   





(6) Coronary artery disease


Current Visit: Yes   Status: Acute   





- Plan


Continue full-dose Lovenox.


Pain medications as needed.


Continue Levaquin for recurrent UTI.


Stable renal function despite bilateral hydronephrosis.


Renal ultrasound results reviewed reports large cervical mass invading the 

bladder and obstructing the ureters causing the hydronephrosis.


Urology input appreciated.


CT abdomen and pelvis with and without contrast requested.


Recent cardiac stent.


Continue Aspirin, Plavix and Ranexa.


Constipation prophylaxis.





Disposition: SNF.








Physician Review: Patient Assessed, Agree with Above Assessment and Plan

## 2021-01-13 NOTE — P.CNS
Date of Consult: 01/13/21


75-year-old woman with hypertension, CAD status post PCI with stent 5 days prior

to admission on aspirin and Plavix since that time presents via emergency 

department for admission and urologic consultation extended presumably given 

large volume hydronephrosis and staghorn calculus seen on ultrasound below.





Report Status: Signed


EXAM DESCRIPTION:  US - Renal Ultrasound-Complete - 1/12/2021 4:47 pm


 


CLINICAL HISTORY:  . Chronic renal disease. Abdominal pain


 


COMPARISON:  January 7, 2021 cat scan


 


FINDINGS:  The right kidney measures 11 cm with a mildly increased echotexture. 

Staghorn right renal calculus


 


The left kidney measures 10 cm with a mildly increased echotexture. Small left 

renal calculi


 


Moderate bilateral hydronephrosis. A large cervical mass invades the bladder


 


IMPRESSION:  Large cervical mass which invades the bladder obstructs the ureters

resulting in moderate bilateral hydronephrosis


 


Increased renal echotexture consistent with parenchymal disease


 


Staghorn right renal calculus





Dictated By: Alfred Treviño MD   01/12/21 1714








Recommend Abdomen-Pelvis CT - CTUrogram (w/o then w/ IV contrast + delayed phase

imaging) to assess for 1) potential obstructing calculus (given bilateral 

nephrolithiasis), anatomic obstruction from cervical mass as suspected on U/S, 

or other source for the hydronephrosis.


If unable to obtain IV contrast (cr 1.2 (eGFR 44 today), non-contrast CT Abd-

pelvis (stone protocol) recommended 





EXAM DESCRIPTION:  CT - Stone Protocol - 1/13/2021 2:36 pm


 


CLINICAL HISTORY:  Obstructive uropathy


 


COMPARISON:  Stone Protocol dated 1/7/2021; Stone Protocol dated 12/9/2020


 


TECHNIQUE:  Axial 5 mm thick images were obtained without oral or IV contrast. 

The field-of-view spans the entirety of the  system including uppermost 

abdomen and lung bases.


 


All CT scans are performed using dose optimization technique as appropriate and 

may include automated exposure control or mA/KV adjustment according to patient 

size.


 


FINDINGS:  Large right-sided staghorn calculus again noted. Moderate severity 

right-sided hydronephrosis surrounds the staghorn similar in severity to 

comparison. Moderate severity right-sided hydroureter extends towards the pelvic

inlet. Two 5 millimeter sized calcifications are present layering along the 

posterior wall of the ureter at the pelvic inlet. Moderately severe left-sided 

hydronephrosis and hydroureter present as well to at least the pelvic inlet 

level. Punctate calyx and parenchymal calculi on the left again. . No suspicious

solid mass of either kidney. No significant adrenal finding. Urinary bladder is 

only partially filled. Several small calculi are present within the lumen. Air 

density is present within the lumen as well. This could be from catheterization 

attempts. Large bulky lobulated cervical mass noted. This causes extrinsic 

compression of the ureters. Invasion of the bladder is evident as well. These 

are all stable findings from January 7.


 


Imaged portions of the liver, spleen and pancreas show no suspicious findings on

non-contrast imaging. No gallbladder or biliary tree abnormality identified.


 


No suspicious bowel findings.


 


A 4-5 centimeter size mass is again noted along the inner table right pelvis at 

the SI joint level. Early bone destructive changes are present as previously 

noted. No new mass or bulky lymphadenopathy. Nonspecific periaortic lymph nodes 

are present. Small amount of free fluid is present in the dependent portion of 

the pelvis. No free air or pneumatosis.  Significant fluid retention in the 

subcutaneous fatty tissues. Small amount of free fluid adjacent to the liver.


 


No new bone finding.


 


IMPRESSION:  CT abdomen and pelvis imaging findings are stable from January 7. 

The large pelvic mass, right-sided pelvic malignant adenopathy, and bilateral 

hydronephrosis oral stable.


 


Full assessment including assessment of renal isodense masses and pyelonephritis

is limited in the absence of IV contrast.





75-year-old woman with large cervical mass likely consistent with cervical 

carcinoma, but patient suggests she underwent a biopsy 3 months ago at another 

facility and never followed up the pathologic result.  She indicates she had a 

consultation, likely with a gynecologist, who suggested it was not surgically 

resectable and recommended chemotherapy and radiation therapy.  She has been 

highly resistant to the idea of that approach because of other experiences with 

individuals she has known.  As a result, she now presents extremely anxious and 

worried, feeling that she has prayed and not received the miracle healing/cure 

that she had prayed for.  It is not clear how much she appreciates about her 

underlying disease process.  Following my review of the imaging which 

demonstrated likely cervical mass impingement and obstruction of the bilateral 

ureters causing massive hydroureteronephrosis in the setting of a large right 

staghorn calculus, which she is known about for several years now, also with 

right ureteral calculi layering within the dilated ureters, it appears she also 

has signs of metastatic disease with pelvic lymphadenopathy noted and I 

reference to bone destruction, which likely reflects bone metastatic disease.


I explained her options for management of her obstructed upper tracts to be 

placement of bilateral percutaneous nephrostomy tubes.  While there is mostly 

preservation of her renal function, there is likely a degree of decline as I 

would expect her creatinine to be <1.0.  Were she to visit with a medical 

oncologist, depending on the chemotherapeutic regimen most appropriate, she may 

benefit from placement of the percutaneous nephrostomy tubes to optimize her 

renal function in order to receive the most effective chemotherapeutic regimen. 

Otherwise, if she elects not to proceed down the medical oncologic route, there 

likely is little value in burdening her with bilateral externalized drainage 

devices in the setting of metastatic disease where her risk of mortality in 

short order is likely high.


Therefore, I recommend medical oncology and radiation oncology consultation and 

discussion.  If the chemotherapeutic regimen agreed upon requires optimal renal 

function, a percutaneous nephrostomy tube should be placed bilaterally.  


As far as management of her staghorn renal calculi, as long as she has no signs 

of progressively necrotizing infection like xanthogranulomatous pyelonephritis 

or further decline in her renal function associated with the stones, there is no

role for immediate surgical therapy to manage her stones in the setting of 

metastatic disease where her operative morbidity is high.





Over 30 minutes face-to-face time was spent with this patient who clearly has 

acceptance issues relative to her malignancy diagnosis.


Clinic conversation with Dr. Mcclelland revealed that she shared with him she has 

known about this diagnosis for over 10 years and has been using herbal remedies 

to manage it.

## 2021-01-14 VITALS — OXYGEN SATURATION: 99 %

## 2021-01-14 LAB
ALBUMIN SERPL BCP-MCNC: 2.2 G/DL (ref 3.4–5)
BUN BLD-MCNC: 22 MG/DL (ref 7–18)
FERRITIN SERPL-MCNC: 296.7 NG/ML (ref 8–388)
GLUCOSE SERPLBLD-MCNC: 92 MG/DL (ref 74–106)
HCT VFR BLD CALC: 25.4 % (ref 36–45)
IRON SERPL-MCNC: 22 UG/DL (ref 50–170)
LYMPHOCYTES # SPEC AUTO: 0.9 K/UL (ref 0.7–4.9)
PMV BLD: 8.8 FL (ref 7.6–11.3)
POTASSIUM SERPL-SCNC: 4.3 MMOL/L (ref 3.5–5.1)
RBC # BLD: 2.86 M/UL (ref 3.86–4.86)
TRANSFERRIN SERPL-MCNC: 115 MG/DL (ref 200–360)

## 2021-01-14 RX ADMIN — Medication SCH ML: at 20:07

## 2021-01-14 RX ADMIN — SENNOSIDES SCH: 8.6 TABLET, FILM COATED ORAL at 20:09

## 2021-01-14 RX ADMIN — Medication SCH ML: at 08:49

## 2021-01-14 RX ADMIN — METOPROLOL TARTRATE SCH MG: 50 TABLET, FILM COATED ORAL at 08:48

## 2021-01-14 RX ADMIN — DOCUSATE SODIUM SCH MG: 100 CAPSULE, LIQUID FILLED ORAL at 08:49

## 2021-01-14 RX ADMIN — SENNOSIDES SCH MG: 8.6 TABLET, FILM COATED ORAL at 08:48

## 2021-01-14 RX ADMIN — CLOPIDOGREL BISULFATE SCH MG: 75 TABLET, FILM COATED ORAL at 08:48

## 2021-01-14 RX ADMIN — METOPROLOL TARTRATE SCH MG: 50 TABLET, FILM COATED ORAL at 20:06

## 2021-01-14 RX ADMIN — ENOXAPARIN SODIUM SCH MG: 40 INJECTION SUBCUTANEOUS at 12:41

## 2021-01-14 RX ADMIN — ENOXAPARIN SODIUM SCH MG: 40 INJECTION SUBCUTANEOUS at 01:12

## 2021-01-14 RX ADMIN — DOCUSATE SODIUM SCH MG: 100 CAPSULE, LIQUID FILLED ORAL at 20:06

## 2021-01-14 RX ADMIN — DEXTROSE AND SODIUM CHLORIDE SCH MLS: 5; .9 INJECTION, SOLUTION INTRAVENOUS at 11:12

## 2021-01-14 RX ADMIN — SENNOSIDES SCH: 8.6 TABLET, FILM COATED ORAL at 08:59

## 2021-01-14 RX ADMIN — ASPIRIN SCH MG: 81 TABLET, COATED ORAL at 08:48

## 2021-01-14 NOTE — PN
Date of Progress Note:  01/14/2021



Subjective:  The patient was admitted with acute kidney injury secondary to 
obstructive uropathy, complicated UTI and DVT.  The patient found to have 
cervical cancer with invasion to the bladder with obstructive uropathy.  The 
patient had also chronic staghorn kidney on the right side.



Physical Examination:

Vital Signs:  Blood pressure 145/71, pulse of 59, afebrile.  The patient still 
has good urine output.  The patient is incontinent. 

Chest:  Clear to auscultation. 

Heart:  S1, S2.  Regular. 

Abdomen:  Soft.  Dullness on the suprapubic area. 

Extremities:  No edema. 

Neurologic:  Alert and oriented x3.  No focal.



Laboratory Data:  WBC 13.5, H and H 8.2/25.4, platelets 213.  Sodium 145, 
potassium 4.3, bicarb 30, BUN 22, creatinine 1.2, GFR of 42 which is being 
stable, calcium 8.4, phosphorus 2.7, ferritin of 296.  Iron saturation is still 
pending.  Albumin 2.2, corrected calcium is 9.8, vitamin B12 of 256.  Urinalysis
positive for infection.  Microbiology, no growth.



Current Medications:  The patient on include;

1.   Aspirin.

2.   Levaquin 250 daily.

3.   Albuterol.

4.   Plavix.

5.   Metoprolol.

6.   Ranexa.

7.   Zofran.



Assessment And Plan:  

1.   Acute kidney injury on chronic kidney disease with acute component 
secondary to obstructive uropathy and toxic acute tubular necrosis secondary to 
urinary tract infection.  I am going to continue current antibiotic.  I agree 
with Levaquin as good penetration.  We will increase the dose to 500 mg given 
the GFR above 30 and we will continue to monitor the patient.

2.   Hypertension, controlled, optimal.

3.   Complicated urinary tract infection with staghorn and also obstructive 
uropathy secondary to cervical mass with invasion to the bladder.  Seen by 
Urology.  Recommended outpatient workup including chemo and radiation with 
Oncology and Gyn.  I agree with that plan.  The patient cleared from the Renal 
standpoint for discharge planning to follow up as outpatient.

4.   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  Continue current 
anticoagulation.





Time spent discussing with the patient, face-to-face, using the translation, 
discussing with the staff and placing an order, discussing with over 
subspecialty and hospitalist 45 minutes.

TAWNY

DD:  01/14/2021 13:40:37   Voice ID:  [id number]

DT:  01/14/2021 13:56:10   Report ID:  [id number]

ARCHIE

## 2021-01-14 NOTE — P.PN
Subjective


Date of Service: 01/14/21


Chief Complaint: Right leg swelling


Patient has no new complain.  She is tolerating PT today. 








Physical Examination





- Vital Signs


Temperature: 98.2 F


Blood Pressure: 145/71


Pulse: 59


Respirations: 15


Pulse Ox (%): 98





- Physical Exam


General: Alert, In no apparent distress


Neck: Supple


Respiratory: Clear to auscultation bilaterally, Normal air movement


Cardiovascular: Edema (Mild right lower extremity edema.)





Assessment And Plan





- Current Problems (Diagnosis)


(1) DVT, recurrent, lower extremity, acute


Current Visit: Yes   Status: Acute   





(2) Bilateral hydronephrosis


Current Visit: Yes   Status: Acute   





(3) Cervical carcinoma


Current Visit: No   Status: Acute   





(4) Leukocytosis


Current Visit: No   Status: Acute   





(5) Staghorn calculus


Current Visit: No   Status: Acute   





(6) Coronary artery disease


Current Visit: Yes   Status: Acute   





- Plan


Continue full-dose Lovenox.


Pain medications as needed.


Continue Levaquin for recurrent UTI.


Stable renal function despite bilateral hydronephrosis.


Renal ultrasound results reviewed reports large cervical mass invading the 

bladder and obstructing the ureters causing the hydronephrosis.


Urology input appreciated.  CT abdomen and pelvis result reviewed.  Urology to 

follow


Case discussed with Dr. Myrick.  She will evaluate and discussed options of 

treatment.


Recent cardiac stent.


Continue Aspirin, Plavix and Ranexa.


Constipation prophylaxis.


She may probably require nephrostomy tubes along the way.


High risk for surgery at the moment given recent unstable angina and cardiac 

stent placement.





Disposition: [facility].

## 2021-01-15 VITALS — SYSTOLIC BLOOD PRESSURE: 159 MMHG | DIASTOLIC BLOOD PRESSURE: 72 MMHG

## 2021-01-15 VITALS — TEMPERATURE: 97.1 F

## 2021-01-15 LAB
ALBUMIN SERPL BCP-MCNC: 2.4 G/DL (ref 3.4–5)
BUN BLD-MCNC: 20 MG/DL (ref 7–18)
GLUCOSE SERPLBLD-MCNC: 88 MG/DL (ref 74–106)
POTASSIUM SERPL-SCNC: 4.3 MMOL/L (ref 3.5–5.1)

## 2021-01-15 RX ADMIN — Medication SCH: at 20:36

## 2021-01-15 RX ADMIN — Medication SCH ML: at 08:20

## 2021-01-15 RX ADMIN — ENOXAPARIN SODIUM SCH MG: 40 INJECTION SUBCUTANEOUS at 00:23

## 2021-01-15 RX ADMIN — SENNOSIDES SCH: 8.6 TABLET, FILM COATED ORAL at 20:35

## 2021-01-15 RX ADMIN — DOCUSATE SODIUM SCH: 100 CAPSULE, LIQUID FILLED ORAL at 20:35

## 2021-01-15 RX ADMIN — METOPROLOL TARTRATE SCH MG: 50 TABLET, FILM COATED ORAL at 20:35

## 2021-01-15 RX ADMIN — CLOPIDOGREL BISULFATE SCH MG: 75 TABLET, FILM COATED ORAL at 08:19

## 2021-01-15 RX ADMIN — ENOXAPARIN SODIUM SCH MG: 40 INJECTION SUBCUTANEOUS at 13:33

## 2021-01-15 RX ADMIN — METOPROLOL TARTRATE SCH MG: 50 TABLET, FILM COATED ORAL at 08:19

## 2021-01-15 RX ADMIN — Medication SCH ML: at 20:36

## 2021-01-15 RX ADMIN — DOCUSATE SODIUM SCH MG: 100 CAPSULE, LIQUID FILLED ORAL at 08:19

## 2021-01-15 RX ADMIN — DEXTROSE AND SODIUM CHLORIDE SCH: 5; .9 INJECTION, SOLUTION INTRAVENOUS at 06:33

## 2021-01-15 RX ADMIN — ASPIRIN SCH MG: 81 TABLET, COATED ORAL at 08:19

## 2021-01-15 RX ADMIN — SENNOSIDES SCH: 8.6 TABLET, FILM COATED ORAL at 08:20

## 2021-01-15 NOTE — P.DS
Admission Date: 01/13/21


Discharge Date: 01/15/21


Disposition: TRANSFER TO NURSING HOME


Discharge Condition: FAIR


Reason for Admission: Right leg swelling





- Problems


(1) DVT, recurrent, lower extremity, acute


Current Visit: Yes   Status: Acute   





(2) Bilateral hydronephrosis


Current Visit: Yes   Status: Acute   





(3) Cervical carcinoma


Current Visit: No   Status: Acute   





(4) Leukocytosis


Current Visit: No   Status: Acute   





(5) Staghorn calculus


Current Visit: No   Status: Acute   





(6) Coronary artery disease


Current Visit: Yes   Status: Acute   





(7) Acute renal failure


Current Visit: Yes   Status: Acute   


Brief History of Present Illness: 


75-year-old woman with a history of hypertension, coronary artery disease status

post CABG, recent cardiac catheterization with stenting 5 days prior on aspirin 

and Plavix presented to the emergency department with a complaint of right lower

extremity swelling.  Patient reported trouble with ambulation.  She fell and was

not able to get up due to heaviness in her right leg per patient.  Venous 

Doppler done in the ED reported right femoral vein DVT.  Patient was admitted 

for anticoagulation and also to be evaluated by physical therapy.  Patient was 

on Augmentin for recurrent UTI.  She has a history of cervical cancer which per 

patient was diagnosed 10 years ago but has not followed up with gynecology 

oncology.  She mentioned she was seen at [facility] and was supposed to have 

surgery done but did not follow up.





Hospital Course: 


Patient admitted to the medical floor and treated for DVT with full-dose 

Lovenox.  Patient declined any form of DOAC.  She stated Eliquis causes her 

heart to race.  CT abdomen and pelvis reported bilateral moderate to severe 

hydronephrosis, large cervical mass causing extrinsic compression of the 

ureters, right malignant adenopathy and bilateral hydronephrosis.  Patient was 

seen in consultation by oncology-Dr. Myrick who discussed patient options 

with her to include further evaluation followed by chemotherapy and radiation or

hospice.  She is at the moment not at the appropriate functional performance 

stage to undergo chemo or radiation.  Patient declined hospice.  She was 

evaluated by urology who recommended nephrostomy tube placement if she wants to 

proceed with aggressive measures.  I discussed with her the need for nephrostomy

tube placement to preserve her kidney as she decides whether to proceed with 

aggressive measures or not.  Noted her serum creatinine trended up today. 

Patient declined any nephrostomy tube placement at this time.  She was seen by 

physical therapy and patient accepted for Skilled rehab.  Patient would like to 

go to rehab before deciding on any treatment.





Vital Signs/Physical Exam: 














Temp Pulse Resp BP Pulse Ox


 


 98.9 F   64   15   143/65 H  100 


 


 01/15/21 12:00  01/15/21 12:00  01/15/21 12:00  01/15/21 12:00  01/15/21 12:00








General: Alert, In no apparent distress, Oriented x3


Neck: Supple, JVD not distended


Respiratory: Clear to auscultation bilaterally, Normal air movement


Cardiovascular: No edema, Regular rate/rhythm, Normal S1 S2


Gastrointestinal: Soft and benign, Non-distended, No tenderness


Musculoskeletal: Swelling (Right lower extremity mildly swollen compared to the 

left.)


Neurological: Other (No focal deficit.)


Laboratory Data at Discharge: 














WBC  13.5 K/uL (4.3-10.9)  H  01/14/21  04:34    


 


Hgb  8.2 g/dL (12.0-15.0)  L  01/14/21  04:34    


 


Hct  25.4 % (36.0-45.0)  L  01/14/21  04:34    


 


Plt Count  213 K/uL (152-406)  D 01/14/21  04:34    


 


PT  12.9 SECONDS (9.5-12.5)  H  01/11/21  19:10    


 


INR  1.10   01/11/21  19:10    


 


Sodium  144 mmol/L (136-145)   01/15/21  06:26    


 


Potassium  4.3 mmol/L (3.5-5.1)   01/15/21  06:26    


 


BUN  20 mg/dL (7-18)  H  01/15/21  06:26    


 


Creatinine  1.45 mg/dL (0.55-1.3)  H  01/15/21  06:26    


 


Glucose  88 mg/dL ()   01/15/21  06:26    


 


Phosphorus  3.3 mg/dL (2.5-4.9)   01/15/21  06:26    


 


Magnesium  2.1 mg/dL (1.8-2.4)   01/11/21  19:10    


 


Total Bilirubin  0.4 mg/dL (0.2-1.0)   01/12/21  04:40    


 


AST  22 U/L (15-37)   01/12/21  04:40    


 


ALT  11 U/L (12-78)  L  01/12/21  04:40    


 


Alkaline Phosphatase  46 U/L ()   01/12/21  04:40    








Home Medications: 








Aspirin [Aspirin EC 81 MG] 1 tab PO DAILY 06/25/14 


Clopidogrel Bisulfate [Plavix*] 75 mg PO DAILY 30 Days #30 tablet 01/08/21 


Docusate [Colace Cap*] 100 mg PO BID 30 Days #60 cap 01/08/21 


Ensure Enlive 237 ml PO BID  can 01/08/21 


Metoprolol Tartrate [Lopressor*] 1 tab PO BID 30 Days #60 tab 01/08/21 


Nitrofurantoin Monohyd/M-Cryst [Macrobid 100 mg Capsule] 100 mg PO DAILY 30 Days

#30 capsule 01/08/21 


Nitroglycerin [Nitrostat*] 0.4 mg SL UD PRN 30 Days #60 tab 01/08/21 


Ranolazine [Ranolazine ER] 500 mg PO BID 30 Days #60 tab.er.12h 01/08/21 


Senosides [Senokot*] 17.2 mg PO BID  tab 01/08/21 


Albuterol Neb [Proventil 0.083% Neb Soln] 2.5 mg NEB Q6HP PRN  amp 01/13/21 


Enoxaparin Sodium [Lovenox 40 MG INJ*] 40 mg SQ Q12H  syr 01/13/21 


Bisacodyl [Dulcolax] 10 mg RC DAILY PRN #30 supp 01/15/21 


Enoxaparin Sodium [Lovenox 40 MG INJ] 40 mg SQ Q12H #60 syr 01/15/21 


levoFLOXacin [Levaquin*] 500 mg PO BEDTIME #7 tab 01/15/21 





New Medications: 


Bisacodyl [Dulcolax] 10 mg RC DAILY PRN #30 supp


 PRN Reason: Constipation


levoFLOXacin [Levaquin*] 500 mg PO BEDTIME #7 tab


Enoxaparin Sodium [Lovenox 40 MG INJ] 40 mg SQ Q12H #60 syr


Diet: AHA


Activity: Ad lani


Followup: 


Unknown,U [Primary Care Provider] - 1-2 Weeks


Analilia Myrick MD [ACTIVE - CAN ADMIT] -  (within 2 weeks.)


Time spent managing pt's care (in minutes): 45

## 2021-01-15 NOTE — P.PN
Subjective


Date of Service: 01/15/21


Chief Complaint: Right leg swelling


Patient has no new complain.


She reports 5 days of constipation.


Patient seen by oncology-Dr. Myrick.








Physical Examination





- Vital Signs


Temperature: 99.5 F


Blood Pressure: 194/83


Pulse: 69


Respirations: 16


Pulse Ox (%): 99





- Physical Exam


General: Alert, In no apparent distress


Neck: JVD not distended


Respiratory: Clear to auscultation bilaterally, Normal air movement


Cardiovascular: No edema, Regular rate/rhythm, Normal S1 S2


Gastrointestinal: Soft and benign, Non-distended, No tenderness


Musculoskeletal: Swelling (Mild right lower extremity swelling)


Integumentary: No rashes


Neurological: Other (No focal deficit.)





- Studies


Microbiology Data (last 24 hrs): 








01/13/21 02:20   Clean Catch Urine   Friendship Count - Final


                            No growth.


01/13/21 02:20   Clean Catch Urine    - Final


                            No growth.








Assessment And Plan





- Current Problems (Diagnosis)


(1) DVT, recurrent, lower extremity, acute


Current Visit: Yes   Status: Acute   





(2) Bilateral hydronephrosis


Current Visit: Yes   Status: Acute   





(3) Cervical carcinoma


Current Visit: No   Status: Acute   





(4) Leukocytosis


Current Visit: No   Status: Acute   





(5) Staghorn calculus


Current Visit: No   Status: Acute   





(6) Coronary artery disease


Current Visit: Yes   Status: Acute   





(7) Acute renal failure


Current Visit: Yes   Status: Acute   





- Plan


Continue full-dose Lovenox.


Pain medications as needed.


Continue Levaquin for recurrent UTI.


Renal function trended up slightly today.  I discussed transfer for nephrostomy 

tube placement if her renal function get worse and she wants to pursue 

aggressive measures for her cancer.  The patient declined transfer for 

nephrostomy tube placement.  She also declined hospice


Recent cardiac stent.


Continue Aspirin, Plavix and Ranexa.


Treat constipation with Dulcolax suppository and if not successful the trial of 

enema.  Patient declined Mag citrate.


High risk for major surgery at the moment given recent unstable angina and 

cardiac stent placement.





Disposition: [facility].

## 2021-01-16 LAB — HCV RNA SPEC NAA+PROBE-LOG#: <1.18 LOG IU/ML

## 2021-01-17 ENCOUNTER — HOSPITAL ENCOUNTER (EMERGENCY)
Dept: HOSPITAL 97 - ER | Age: 76
Discharge: HOME | End: 2021-01-17
Payer: COMMERCIAL

## 2021-01-17 VITALS — OXYGEN SATURATION: 95 % | SYSTOLIC BLOOD PRESSURE: 108 MMHG | DIASTOLIC BLOOD PRESSURE: 60 MMHG

## 2021-01-17 VITALS — TEMPERATURE: 97.6 F

## 2021-01-17 DIAGNOSIS — A41.9: ICD-10-CM

## 2021-01-17 DIAGNOSIS — I21.4: Primary | ICD-10-CM

## 2021-01-17 DIAGNOSIS — I50.41: ICD-10-CM

## 2021-01-17 DIAGNOSIS — I25.2: ICD-10-CM

## 2021-01-17 DIAGNOSIS — Z95.1: ICD-10-CM

## 2021-01-17 DIAGNOSIS — I11.0: ICD-10-CM

## 2021-01-17 DIAGNOSIS — N13.30: ICD-10-CM

## 2021-01-17 DIAGNOSIS — C53.9: ICD-10-CM

## 2021-01-17 LAB
ALBUMIN SERPL BCP-MCNC: 2.9 G/DL (ref 3.4–5)
ALP SERPL-CCNC: 54 U/L (ref 45–117)
ALT SERPL W P-5'-P-CCNC: 11 U/L (ref 12–78)
AST SERPL W P-5'-P-CCNC: 27 U/L (ref 15–37)
BUN BLD-MCNC: 27 MG/DL (ref 7–18)
F2 GENE MUT ANL BLD/T: (no result)
GLUCOSE SERPLBLD-MCNC: 104 MG/DL (ref 74–106)
HCT VFR BLD CALC: 30.4 % (ref 36–45)
INR BLD: 1.16
LYMPHOCYTES # SPEC AUTO: 1.2 K/UL (ref 0.7–4.9)
MAGNESIUM SERPL-MCNC: 1.9 MG/DL (ref 1.8–2.4)
MORPHOLOGY BLD-IMP: (no result)
PMV BLD: 8.5 FL (ref 7.6–11.3)
POTASSIUM SERPL-SCNC: 3.1 MMOL/L (ref 3.5–5.1)
RBC # BLD: 3.45 M/UL (ref 3.86–4.86)
TROPONIN (EMERG DEPT USE ONLY): 0.03 NG/ML (ref 0–0.04)

## 2021-01-17 PROCEDURE — 80076 HEPATIC FUNCTION PANEL: CPT

## 2021-01-17 PROCEDURE — 96361 HYDRATE IV INFUSION ADD-ON: CPT

## 2021-01-17 PROCEDURE — 96374 THER/PROPH/DIAG INJ IV PUSH: CPT

## 2021-01-17 PROCEDURE — 71250 CT THORAX DX C-: CPT

## 2021-01-17 PROCEDURE — 36415 COLL VENOUS BLD VENIPUNCTURE: CPT

## 2021-01-17 PROCEDURE — 99285 EMERGENCY DEPT VISIT HI MDM: CPT

## 2021-01-17 PROCEDURE — 83735 ASSAY OF MAGNESIUM: CPT

## 2021-01-17 PROCEDURE — 85610 PROTHROMBIN TIME: CPT

## 2021-01-17 PROCEDURE — 96375 TX/PRO/DX INJ NEW DRUG ADDON: CPT

## 2021-01-17 PROCEDURE — 85025 COMPLETE CBC W/AUTO DIFF WBC: CPT

## 2021-01-17 PROCEDURE — 83880 ASSAY OF NATRIURETIC PEPTIDE: CPT

## 2021-01-17 PROCEDURE — 71045 X-RAY EXAM CHEST 1 VIEW: CPT

## 2021-01-17 PROCEDURE — 84484 ASSAY OF TROPONIN QUANT: CPT

## 2021-01-17 PROCEDURE — 80048 BASIC METABOLIC PNL TOTAL CA: CPT

## 2021-01-17 PROCEDURE — 93005 ELECTROCARDIOGRAM TRACING: CPT

## 2021-01-17 NOTE — ER
Nurse's Notes                                                                                     

 [facility]                                                                 

Name: Va Hughes                                                                              

Age: 75 yrs                                                                                       

Sex: Female                                                                                       

: 1945                                                                                   

MRN: [id number]                                                                                   

Arrival Date: 2021                                                                          

Time: 10:19                                                                                       

Account#: [id number]                                                                            

Bed 17                                                                                            

Private MD:                                                                                       

Diagnosis: Non-ST elevation (NSTEMI) myocardial infarction;Acute combined systolic (congestive)   

  and diastolic (congestive) heart failure;Sepsis, unspecified organism                           

                                                                                                  

Presentation:                                                                                     

                                                                                             

10:22 Chief complaint: EMS states: Mid-sternal CP started last night, Nitro x 3 at the        [facility] 

      nursing [address] and x 2 by EMS, no relief, Asprin held due to gastric ulcers. Pt suppose       

      to be to taking Ranolazine for angina but it's on back order for a couple weeks. Pt has     

      not had any home medications this morning. Coronavirus screen: Client denies travel out     

      of the [place] in the last 14 days. At this time, the client does not indicate any             

      symptoms associated with coronavirus-19. Ebola Screen: No symptoms or risks identified      

      at this time. Initial Sepsis Screen: Does the patient meet any 2 criteria? No.              

      Patient's initial sepsis screen is negative. Does the patient have a suspected source       

      of infection? No. Patient's initial sepsis screen is negative. Risk Assessment: Do you      

      want to hurt yourself or someone else? Patient reports no desire to harm self or            

      others. Onset of symptoms was 2021. Care prior to arrival: Medication(s)        

      given: Nitro IV initiated. 20 GA, in the right forearm. Transition of care: patient was     

      received from another setting of care (long-term care facility), [facility].               

10:22 Method Of Arrival: EMS: [place] [id number] 

10:22 Acuity: EDMOND 2                                                                           [facility] 

                                                                                                  

Triage Assessment:                                                                                

10:29 General: Appears in no apparent distress. uncomfortable, Behavior is cooperative,       [facility] 

      anxious. Pain: Complains of pain in mid-sternal area Pain does not radiate. Pain            

      currently is 10 out of 10 on a pain scale. Quality of pain is described as crushing,        

      Pain began 1 day ago. Is continuous. Neuro: Level of Consciousness is awake, alert,         

      obeys commands, Oriented to person, place, time, situation. Cardiovascular: Heart tones     

      S1 S2 present Patient's skin is warm and dry. Respiratory: Airway is patent Respiratory     

      effort is even, unlabored, Respiratory pattern is regular, symmetrical, Breath sounds       

      are clear bilaterally. Derm: Skin is pink, warm \T\ dry.                                    

                                                                                                  

Historical:                                                                                       

- Allergies:                                                                                      

10: Iodine;                                                                                 jl7 

10: Sulfa (Sulfonamide Antibiotics);                                                        jl7 

- Home Meds:                                                                                      

10: metoprolol tartrate 50 mg oral tab 1 tab 2 times per day [Active]; Plavix 75 mg Oral    jl7 

      tab 1 tab once daily [Active]; aspirin 81 mg oral TbEC 1 tab once daily [Active];           

      enoxaparin 40 mg/0.4 mL subcutaneous syrg every 12 hours [Active]; ranolazine oral oral     

      1 tab 2 times per day [Active];                                                             

- PMHx:                                                                                           

10: Diabetes - NIDDM; DVT; Hypertension; Myocardial infarction;                             jl7 

- PSHx:                                                                                           

10: CABG; cardiac stents;                                                                   jl7 

                                                                                                  

- Immunization history:: Adult Immunizations up to date.                                          

- Social history:: Smoking status: Patient denies any tobacco usage or history of.                

  Patient/guardian denies using alcohol, street drugs, The patient lives with family.             

- Family history:: not pertinent.                                                                 

                                                                                                  

                                                                                                  

Screening:                                                                                        

10:30 Abuse screen: Denies threats or abuse. Denies injuries from another. Nutritional        jl7 

      screening: No deficits noted. Tuberculosis screening: No symptoms or risk factors           

      identified. Fall Risk IV access (20 points). Total Lopez Fall Scale indicates No Risk       

      (0-24 pts).                                                                                 

                                                                                                  

Assessment:                                                                                       

10:30 General: See triage assessment.                                                         jl7 

12:30 Reassessment: Pt' O2 sats reading 86%, placed pt on 2 lpm per NC, O2 up to 94%.         jl7 

13:30 Reassessment: Spoke to Emily, pt's daughter [phone], who reports pt is aware that jl7 

      if she does not have this procedure she will die, they were told the last time she was      

      here that if she didn't have the procedure she would need to be on Hospice. Emily            

      requesting for pt to be kept here for SS consult to initiate Hospice and get her to a       

      long-term care facility. ERD notified, will speak to family.                                

14:30 Reassessment: Pt's O2 at 72%, pt removed NC and states "I can't breathe with that thing jl7 

      in." O2 turned up to 4 lpm and NC placed back in nose, ERD notified and at bedside. Pt      

      keeps taking NC out of nose, sats continue to fluctuate between 78 - 90%, pt keeps          

      saying "I can't breathe." BiPap ordered.                                                    

14:50 Reassessment: RT at bedside with BiPap.                                                 [facility] 

15:00 Reassessment: Pt refusing to keep BiPap on, non-rebreather placed on pt at this time.   [facility] 

15:20 Reassessment: Spoke to Sharri Dawn RN at [facility] with updated POC, pt requesting    [facility] 

      hospice and DNR status.                                                                     

16:18 Reassessment: Pt reports she does not want to be DNR/DNI, she wants everything done to  [facility] 

      save her life. Pt continues to refuse transfer to another facility for treatment, wants     

      to return to nursing home. Updated GRACY Harrell with [facility] and she reports daughter        

      set up Hospice with [facility], they are getting O2 setup and will call ER once they get          

      transportation setup.                                                                       

18:09 Reassessment: Spoke with Angy from [facility], once the O2 is delivered to [address] they will update with a transport time.                                             

19:17 Reassessment: GRACY Hsu with [facility] at bedside to assess pt.                      [facility] 

19:24 Reassessment: Per Aracelis MARIO RN, Pt will be  at 22:30 to go back to [facility].    

      Pain: Denies pain.                                                                          

21:00 Reassessment: Patient appears in no apparent distress at this time. Patient and/or      wh  

      family updated on plan of care and expected duration. Pain level reassessed. Pt             

      sleeping eyes closed. Pain: Denies pain.                                                    

22:40 Reassessment: Patient appears in no apparent distress at this time. Patient and/or      wh  

      family updated on plan of care and expected duration. Pain level reassessed.                

                                                                                                  

Vital Signs:                                                                                      

10:22  / 103; Pulse 97; Resp 21; Temp 97.6; Pulse Ox 98% ; Pain 10/10;                  jl7 

11:00  / 90; Pulse 95; Resp 19; Pulse Ox 98% ;                                          jl7 

12:30  / 89; Pulse 94; Resp 19; Pulse Ox 85% ;                                          jl7 

13:15  / 96; Pulse 94; Resp 17 S; Pulse Ox 92% on 2 lpm NC;                             jl7 

13:42  / 95; Pulse 117; Resp 19; Pulse Ox 98% ;                                         jl7 

14:00 BP 98 / 88; Pulse 123; Resp 23; Pulse Ox 92% ;                                          jl7 

14:30 Pulse Ox 74% on R/A;                                                                    jl7 

14:35 Pulse Ox 91% on 4 lpm NC;                                                               jl7 

15:20  / 74; Pulse 123; Resp 20; Pulse Ox 96% ;                                         jl7 

16:00  / 73; Pulse 113; Resp 24; Pulse Ox 96% on Non-rebreather mask;                   jl7 

17:45  / 67; Pulse 105; Resp 22; Pulse Ox 99% on Non-rebreather mask;                   jl7 

19:25  / 70; Pulse 102; Resp 22; Pulse Ox 94% on Non-rebreather mask;                   wh  

21:00  / 58; Pulse 98; Resp 20; Pulse Ox 93% on Non-rebreather mask;                      

22:45  / 60; Pulse 107; Resp 20; Pulse Ox 95% on Non-rebreather mask;                     

                                                                                                  

ED Course:                                                                                        

10:19 Patient arrived in ED.                                                                  ds1 

10:21 Hans Sosa MD is Attending Physician.                                           ma2 

10:22 Idania Hendricks RN is Primary Nurse.                                                      jl7 

10:26 Triage completed.                                                                       jl7 

10:29 Arm band placed on right wrist.                                                         jl7 

10:30 Patient has correct armband on for positive identification. Placed in gown. Bed in low  jl7 

      position. Call light in reach. Side rails up X 1. Cardiac monitor on. Pulse ox on. NIBP     

      on. Warm blanket given.                                                                     

10:30 Patient maintains SpO2 saturation greater than 95% on room air.                         jl7 

10:32 EKG done, by ED staff, reviewed by Hans Sosa MD.                                 em1 

10:55 Initial lab(s) drawn, by ED staff, sent to lab. Inserted saline lock: 22 gauge in right jl7 

      hand, using aseptic technique. Blood collected.                                             

11:03 XRAY Chest (1 view) In Process Unspecified.                                             EDMS

11:18 Inserted saline lock: 20 gauge in left forearm, using aseptic technique.                jl7 

11:22 Thorax Wo Con In Process Unspecified.                                                   EDMS

12:56 Repeat lab(s) drawn. by me, sent to lab.                                                jp3 

13:59 Lab(s) recollected, by me, sent to lab.                                                 jp3 

14:00 Warm blanket given. Verbal reassurance given. Diet: Patient given water. Tolerated well.jp3 

19:42 Primary Nurse role handed off by Idania Hendricks, RN                                       mw2 

22:46 No provider procedures requiring assistance completed. IV discontinued, intact,         wh  

      bleeding controlled, No redness/swelling at site.                                           

                                                                                                  

Administered Medications:                                                                         

11:00 Drug: Zofran (Ondansetron) 4 mg Route: IVP; Site: right hand;                           jl7 

13:39 Follow up: Response: No adverse reaction                                                jl7 

11:00 Drug: NS 0.9% 1000 ml Route: IV; Rate: 1 bolus; Site: right hand;                       jl7 

12:00 Follow up: IV Status: Completed infusion; IV Intake: 1000ml                             jl7 

11:00 Drug: GI Cocktail without Donnatal - (Maalox Suspension 30 ml, Lidocaine Liquid 2 % 15  jl7 

      ml) Route: PO;                                                                              

11:30 Follow up: Response: No adverse reaction                                                jl7 

11:05 Drug: morphine 4 mg Route: IVP; Site: right hand;                                       jl7 

11:30 Follow up: Response: No adverse reaction; Pain is decreased                             jl7 

11:13 Drug: Benadryl 50 mg Route: IVP; Site: right hand;                                      jl7 

13:39 Follow up: Response: No adverse reaction                                                jl7 

13:39 Drug: Rocephin 1 grams Route: IV; Rate: calculated rate; Site: right hand;              jl7 

13:42 Follow up: Response: No adverse reaction; IV Status: Completed infusion                 jl7 

                                                                                                  

                                                                                                  

Intake:                                                                                           

12:00 IV: 1000ml; Total: 1000ml.                                                              jl7 

                                                                                                  

Outcome:                                                                                          

15:01 Discharge ordered by MD.                                                                ma2 

22:47 Discharged to nursing home. Report given to [facility]                                     

22:47 Condition: stable                                                                           

22:47 Instructed on follow up and referral plans.                                                 

22:48 Patient left the ED.                                                                      

                                                                                                  

Signatures:                                                                                       

Dispatcher [employer]                           EDMS                                                 

Kalli Mckoy1                                                  

Brady Paredes                               em1                                                  

Idania Hendricks, GRACY DUBOSE   jl7                                                  

Jolene Ramey RN RN                                                      

Hans Sosa MD MD ma2 [place], MyKena                            mw2                                                  

Dorian Auguste                              jp3                                                  

                                                                                                  

Corrections: (The following items were deleted from the chart)                                    

19:23 19:17 Reassessment: GRACY Hsu with [facility] at bedside to assess pt jl7             jl7 

                                                                                                  

**************************************************************************************************

## 2021-01-17 NOTE — RAD REPORT
EXAM DESCRIPTION:  CT - Thorax Wo Con - 1/17/2021 11:23 am

 

CLINICAL HISTORY:  CHEST PAIN

 

COMPARISON:  Chest Single View dated 1/17/2021; Stone Protocol dated 1/13/2021

 

TECHNIQUE:  Axial 5 mm thick images of the chest were obtained without IV contrast.

 

All CT scans are performed using dose optimization technique as appropriate and may include automated
 exposure control or mA/KV adjustment according to patient size.

 

FINDINGS:  Increased interstitial opacification present with a few scattered alveolar opacities in th
e lung parenchyma. Patient has small bilateral pleural effusions with trace amount of atelectasis in 
each posterior lower lobe. No one mass or consolidations seen. No endobronchial lesion. No pleural th
ickening or pleural based mass. No pneumothorax.

 

No abnormal mediastinal or hilar masses or lymphadenopathy seen. A few small nonspecific mediastinal 
lymph nodes are present. CABG surgical changes are noted. Dense coronary artery calcifications are se
en. Aorta and pulmonary artery assessment are limited in the absence of contrast. Mild cardiomegaly i
s present without pericardial effusion.

 

No chest wall mass or abnormal axillary lymphadenopathy. Mild fluid retention in the subcutaneous fat
ty tissues.

 

Limited upper abdomen imaging shows minimal ascites adjacent to the liver. There is significant hydro
nephrosis of a partially imaged left kidney. Right adrenal mass is present. These are all stable find
ings over the short interval since January 13.

 

IMPRESSION:  Chest findings detailed above are most consistent with a mild CHF or volume overload.

 

No focal consolidation to suspect bacterial pneumonia. Lung parenchymal findings are potentially jolene
l infiltrate if there are matching clinical findings. Chest findings do not elevate concern for COVID
-19 pneumonia.

## 2021-01-17 NOTE — EDPHYS
Physician Documentation                                                                           

 [facility]                                                                 

Name: Va Hughes                                                                              

Age: 75 yrs                                                                                       

Sex: Female                                                                                       

: 1945                                                                                   

MRN: [id number]                                                                                   

Arrival Date: 2021                                                                          

Time: 10:19                                                                                       

Account#: [id number]                                                                            

Bed 17                                                                                            

Private MD:                                                                                       

ED Physician Hans Sosa                                                                    

HPI:                                                                                              

                                                                                             

10:47 This 75 yrs old  Female presents to ER via EMS with complaints of Chest Pain.  ma2 

10:47 The patient or guardian reports chest pain that is located primarily in the substernal  ma2 

      area. Onset: gradually, 2 day(s) ago. Associated signs and symptoms: Pertinent              

      negatives: cough, dizziness, lower extremity pain, lightheadedness. Severity of pain:       

      At its worst the pain was moderate in the emergency department the pain is unchanged.       

      The patient has experienced similar episodes in the past.                                   

                                                                                                  

Historical:                                                                                       

- Allergies:                                                                                      

10: Iodine;                                                                                 jl7 

10:29 Sulfa (Sulfonamide Antibiotics);                                                        jl7 

- Home Meds:                                                                                      

10: metoprolol tartrate 50 mg oral tab 1 tab 2 times per day [Active]; Plavix 75 mg Oral    jl7 

      tab 1 tab once daily [Active]; aspirin 81 mg oral TbEC 1 tab once daily [Active];           

      enoxaparin 40 mg/0.4 mL subcutaneous syrg every 12 hours [Active]; ranolazine oral oral     

      1 tab 2 times per day [Active];                                                             

- PMHx:                                                                                           

10:29 Diabetes - NIDDM; DVT; Hypertension; Myocardial infarction;                             jl7 

- PSHx:                                                                                           

10:29 CABG; cardiac stents;                                                                   jl7 

                                                                                                  

- Immunization history:: Adult Immunizations up to date.                                          

- Social history:: Smoking status: Patient denies any tobacco usage or history of.                

  Patient/guardian denies using alcohol, street drugs, The patient lives with family.             

- Family history:: not pertinent.                                                                 

                                                                                                  

                                                                                                  

ROS:                                                                                              

10:47 Constitutional: Negative for fever, chills, and weight loss.                            ma2 

10:47 All other systems are negative.                                                             

                                                                                                  

Exam:                                                                                             

10:47 Constitutional:  This is a well developed, well nourished patient who is awake, alert,  ma2 

      and in no acute distress. Chest/axilla:  Normal chest wall appearance and motion.           

      Nontender with no deformity.  No lesions are appreciated. Cardiovascular:  Regular rate     

      and rhythm with a normal S1 and S2.  No gallops, murmurs, or rubs.  Normal PMI, no JVD.     

       No pulse deficits. Respiratory:  Lungs have equal breath sounds bilaterally, clear to      

      auscultation and percussion.  No rales, rhonchi or wheezes noted.  No increased work of     

      breathing, no retractions or nasal flaring. Abdomen/GI:  Soft, non-tender, with normal      

      bowel sounds.  No distension or tympany.  No guarding or rebound.  No evidence of           

      tenderness throughout. MS/ Extremity:  Pulses equal, no cyanosis.  Neurovascular            

      intact.  Full, normal range of motion. Neuro:  Awake and alert, GCS 15, oriented to         

      person, place, time, and situation.  Cranial nerves II-XII grossly intact.  Motor           

      strength 5/5 in all extremities.  Sensory grossly intact.  Cerebellar exam normal.          

      Normal gait.                                                                                

                                                                                                  

Vital Signs:                                                                                      

10:22  / 103; Pulse 97; Resp 21; Temp 97.6; Pulse Ox 98% ; Pain 10/10;                  jl7 

11:00  / 90; Pulse 95; Resp 19; Pulse Ox 98% ;                                          jl7 

12:30  / 89; Pulse 94; Resp 19; Pulse Ox 85% ;                                          jl7 

13:15  / 96; Pulse 94; Resp 17 S; Pulse Ox 92% on 2 lpm NC;                             jl7 

13:42  / 95; Pulse 117; Resp 19; Pulse Ox 98% ;                                         jl7 

14:00 BP 98 / 88; Pulse 123; Resp 23; Pulse Ox 92% ;                                          jl7 

14:30 Pulse Ox 74% on R/A;                                                                    jl7 

14:35 Pulse Ox 91% on 4 lpm NC;                                                               jl7 

15:20  / 74; Pulse 123; Resp 20; Pulse Ox 96% ;                                         jl7 

16:00  / 73; Pulse 113; Resp 24; Pulse Ox 96% on Non-rebreather mask;                   jl7 

17:45  / 67; Pulse 105; Resp 22; Pulse Ox 99% on Non-rebreather mask;                   jl7 

19:25  / 70; Pulse 102; Resp 22; Pulse Ox 94% on Non-rebreather mask;                   wh  

21:00  / 58; Pulse 98; Resp 20; Pulse Ox 93% on Non-rebreather mask;                    wh  

22:45  / 60; Pulse 107; Resp 20; Pulse Ox 95% on Non-rebreather mask;                   wh  

                                                                                                  

MDM:                                                                                              

10:21 Patient medically screened.                                                             ma2 

12:33 Differential diagnosis: abnormal EKG, gastroesophageal reflux disease (GERD),           ma2 

      pericarditis, pleurisy, stable angina. HEART Score: History: Moderately Suspicious (1),     

      ECG: Non specific repolarization disturbance / LBTB / PM (1), Age: > or = 65 years (2),     

      Risk Factors: > or = 3 Risk factors for atherosclerotic disease (2), Troponin: < or = 1     

      x Normal Limit (0), Total Score = 5. The patient was not given aspirin in the Emergency     

      Department. Administered by EMS. WILLIAM Risk Score: 1 - patient's age is greater or equal     

      to 65 years.                                                                                

12:36 ED course: discussed with dr. ayala hospitalist who is familiar with her and he just    ma2 

      discharged her yesterday for chest pain, her ekg today is unremarkable and showing st       

      depression. her trop is negative.. her wbc is elevated to 20 K and was elevated             

      before.. dr. ayala is familiar with this patient, she had CABG and CATH a week ago and      

      has been having chest pain while in the hospital.. her chest pain is unchanged and at       

      rest.. no chest pain in the last hour in the er.. the main issue is she has cancer and      

      bilateral hydronephrosis. she needs nephrostomy tube.. she was offered to be                

      transferred to [facility] for cancer treatment and nephrosotomy tube, she declined         

      that. she is being treated with levaquine, her wbc is 20K today. dr. ayala evaluated        

      the patient in the er.. i agree with plan .                                                 

14:53 ED course: patient has mild elevation in troponin 0.35. her repeated EKG showing st     ma2 

      depression. she could be having thrombosis of her cardiac stent.. patient now has           

      shortness of breath and hypoxemia, her sats are on 75, rr is 50 and HR is 130,, she is      

      likely having MI, acute CHF and possible sepsis from her hydronephrosis, I discussed        

      with her again if she agree to be transferred emergency to [place] for higher level of      

      care and where she can also pursue treatment for her esophageal cancer. She does not        

      want any medical care at this time does not want to be transferred, she states she want     

      Hospice, I called  and discussed the case again. dr. Ayala discussed with dr. Wyman as well. I called the patient daughter, ARLEN, and she confirm and states that       

      after talking to her mom, her mom want to pursue hospice. i will transfer her back to       

      the nursing home and hospice will be arranged from there .                                  

15:02 Data reviewed: vital signs, nurses notes, EMS record, nursing home records. Counseling: preet 

      I had a detailed discussion with the patient and/or guardian regarding: the historical      

      points, exam findings, and any diagnostic results supporting the discharge/admit            

      diagnosis, the presence of at least one elevated blood pressure reading (>120/80)           

      during this emergency department visit, the need to transfer to another facility.           

15:10 ED course: I discussed code status with the patient and she wants DNR DNI. ED course:   preet 

      patient now states she want a full code she does want intubation and CPR if needed !!!.     

                                                                                                  

                                                                                             

10:22 Order name: Basic Metabolic Panel                                                       [facility] 

                                                                                             

10:22 Order name: CBC with Diff                                                               [facility] 

                                                                                             

10:22 Order name: LFT's                                                                       [facility] 

                                                                                             

10:22 Order name: Magnesium                                                                   [facility] 

                                                                                             

10:22 Order name: NT PRO-BNP; Complete Time: 11:54                                            [facility] 

                                                                                             

10:22 Order name: PT-INR; Complete Time: 11:03                                                [facility] 

                                                                                             

10:22 Order name: Troponin (emerg Dept Use Only); Complete Time: 11:54                        ma                                                                                             

10:22 Order name: XRAY Chest (1 view); Complete Time: 11:54                                   [facility] 

                                                                                             

10:24 Order name: Basic Metabolic Panel; Complete Time: 11:54                                 [facility]

                                                                                             

10:24 Order name: CBC with Automated Diff; Complete Time: 12:32                                                                                                                            

10:24 Order name: Liver (Hepatic) Function; Complete Time: 11:54                              [facility]

                                                                                             

10:24 Order name: Magnesium; Complete Time: 11:54                                             [facility]

                                                                                             

11:02 Order name: Manual Differential; Complete Time: 12:32                                   [facility]

                                                                                             

12:35 Order name: Troponin (emerg Dept Use Only); Complete Time: 14:52                        [facility] 

                                                                                             

10:22 Order name: EKG; Complete Time: 10:24                                                   [facility] 

                                                                                             

10:22 Order name: Cardiac monitoring; Complete Time: 10:45                                    ma2 

                                                                                             

10:22 Order name: EKG - Nurse/Tech; Complete Time: 10:31                                      ma2 

                                                                                             

10:22 Order name: IV Saline Lock; Complete Time: 10:45                                        ma2 

                                                                                             

10:22 Order name: Labs collected and sent; Complete Time: 10:45                               ma2 

                                                                                             

10:22 Order name: O2 Per Protocol; Complete Time: 10:45                                       ma2 

                                                                                             

10:22 Order name: O2 Sat Monitoring; Complete Time: 10:45                                     ma2 

                                                                                             

11:07 Order name: Thorax Wo Con; Complete Time: 11:54                                         [facility]

                                                                                             

13:06 Order name: Labs - recollect needed: recollect the trop/ clotted; Complete Time: 14:00  eb  

                                                                                                  

Administered Medications:                                                                         

11:00 Drug: Zofran (Ondansetron) 4 mg Route: IVP; Site: right hand;                           jl7 

13:39 Follow up: Response: No adverse reaction                                                jl7 

11:00 Drug: NS 0.9% 1000 ml Route: IV; Rate: 1 bolus; Site: right hand;                       jl7 

12:00 Follow up: IV Status: Completed infusion; IV Intake: 1000ml                             jl7 

11:00 Drug: GI Cocktail without Donnatal - (Maalox Suspension 30 ml, Lidocaine Liquid 2 % 15  jl7 

      ml) Route: PO;                                                                              

11:30 Follow up: Response: No adverse reaction                                                jl7 

11:05 Drug: morphine 4 mg Route: IVP; Site: right hand;                                       jl7 

11:30 Follow up: Response: No adverse reaction; Pain is decreased                             jl7 

11:13 Drug: Benadryl 50 mg Route: IVP; Site: right hand;                                      jl7 

13:39 Follow up: Response: No adverse reaction                                                jl7 

13:39 Drug: Rocephin 1 grams Route: IV; Rate: calculated rate; Site: right hand;              jl7 

13:42 Follow up: Response: No adverse reaction; IV Status: Completed infusion                 jl7 

                                                                                                  

                                                                                                  

Disposition:                                                                                      

21 15:01 Discharged to Home. Impression: Non-ST elevation (NSTEMI) myocardial               

  infarction, Acute combined systolic (congestive) and diastolic (congestive)                     

  heart failure, Sepsis, unspecified organism.                                                    

- Condition is Stable.                                                                            

- Discharge Instructions: Heart Attack.                                                           

                                                                                                  

- Medication Reconciliation Form, Thank You Letter, Antibiotic Education, Prescription            

  Opioid Use, SBAR form form.                                                                     

- Follow up: Private Physician; When: Tomorrow; Reason: Continuance of care.                      

                                                                                                  

                                                                                                  

                                                                                                  

Signatures:                                                                                       

Dispatcher [employer]                           EDIdania Lui RN                        RN   jl7                                                  

Jolene Ramey RN RN                                                      

Hans Sosa MD MD   ma2                                                  

Machelle Beatty                                                   

                                                                                                  

Corrections: (The following items were deleted from the chart)                                    

11:07 10:37 Chest For PE Angio+CT.RAD.BRZ ordered. [facility]

12:49 12:36 ED course: discussed with dr. ayala hospitalist who is familiar with her and he   ma2 

      just discharged her yesterday for chest pain, her ekg today is unremarkable lab is all      

      non critical although her . [facility]                                                             

16:15 15:10 ED course: DNR DNR DNR DNR DNR !!!!!!!!!!!!!!!!!!. ma2                            [facility] 

22:48 15:01 2021 15:01 Discharged to Home. Impression: Non-ST elevation (NSTEMI)        wh  

      myocardial infarction; Acute combined systolic (congestive) and diastolic (congestive)      

      heart failure; Sepsis, unspecified organism. Condition is Stable. Forms are Medication      

      Reconciliation Form, Thank You Letter, Antibiotic Education, Prescription Opioid Use.       

      Follow up: Private Physician; When: Tomorrow; Reason: Continuance of care. ma2              

                                                                                                  

**************************************************************************************************

## 2021-01-17 NOTE — RAD REPORT
EXAM DESCRIPTION:  RAD - Chest Single View - 1/17/2021 11:03 am

 

CLINICAL HISTORY:  CHEST PAIN

 

COMPARISON:  Portable January 2

 

TECHNIQUE:  AP portable chest image was obtained 1/17/2021 11:03 am .

 

FINDINGS:  No focal mass consolidation. Increased interstitial opacification is present only partiall
y due to a more shallow inspiratory effort. Interstitial edema or infiltrate are suspected. Minimal a
lveolar opacities are present. Mild cardiomegaly is present. Vasculature is mildly prominent. Sternot
jorge wires are in place from prior CABG. No measurable pleural effusion and no pneumothorax. No acute 
bony abnormality seen. No acute aortic findings suspected.

 

IMPRESSION:  Mild CHF/volume overload suspected and can be correlated with clinical presentation.

 

Viral infiltrate or noncardiogenic edema etiologies are possible as well.

## 2021-01-17 NOTE — P.CNS
Date of Consult: 01/17/21


Patient presented to the emergency department with complaint of chest pain.


Initial troponin is negative.


CT chest reported pleural effusion.


Blood work:  WBC:  20,000.





The ED physician was considering hospitalization for chest pain rule out.


I discuss case with Dr. Wyman recommended no further cardiac surgical 

intervention.  He recommended to increase her Ranexa dose to 1000 mg b.i.d.


Patient with leukocytosis, prior history of UTI which was being treated with 

oral Levaquin.


Given that patient has bilateral moderate to severe hydronephrosis secondary to 

extrinsic compression of bilateral ureters.


I again recommended transfer to [facility] for nephrostomy to decompress 

her kidneys.  This was communicated to the ED Physician.


The ED physician's note reviewed and noted patient wants to pursue hospice.

## 2021-01-17 NOTE — XMS REPORT
Continuity of Care Document

                           Created on:2021



Patient:FANTA MCCABE

Sex:Female

:1945

External Reference #:[id number]





Demographics







                          Address                   [address] [address]

 

                          Home Phone                [phone]

 

                          Work Phone                [phone]

 

                          Mobile Phone              [phone]

 

                          Email Address             [address]

 

                          Preferred Language        English

 

                          Marital Status            Unknown

 

                          Mandaen Affiliation     Unknown

 

                          Race                      Unknown

 

                          Additional Race(s)        Unavailable



                                                    Unavailable



                                                    White

 

                          Ethnic Group              Unknown









Author







                          Organization              [facility]

t

 

                          Address                   [address]



                                                    [place] [address]

 

                          Phone                     [phone]









Support







                Name            Relationship    Address         Phone

 

                Thanh (DAUGHTER) ESTELLE Child           [phone]





                                                [place] [address] 

 

                Luiz          Mother          Unavailable     [phone]

 

                Martinez (Brother)   Sibling         Unavailable     [phone]









Care Team Providers







                    Name                Role                Phone

 

                    CHRISTINA ALMAGUER         Primary Care Physician Unavailable

 

                    SYSTEM,  NOT IN     Attending Clinician Unavailable

 

                    Doctor Unassigned,  Name Attending Clinician Unavailable

 

                    Jose BAH       Attending Clinician [phone]

 

                    Dontae RN,  A       Attending Clinician Unavailable









Problems

This patient has no known problems.



Allergies, Adverse Reactions, Alerts

This patient has no known allergies or adverse reactions.



Medications

This patient has no known medications.



Procedures

This patient has no known procedures.



Encounters







        Start   End     Encounter Admission Attending Care    Care    Encounter 

Source



        Date/Time Date/Time Type    Type    Clinicians Facility Department ID   

   

 

        2020-10-07         Outpatient         SYSTEM, [facility]     [id number]

 MD



        10:40:33                         PROVIDER                         Carlos desir

 

        2020-10-23 2020-10-23 Orders          Doctor OATES    [id number] [id number]

26 



        00:00:00 00:00:00 Only            UnassFANG neville   350.1.13.10       

  



                                        [facility] [id number]         



                                                        [phone]         



                                                        009             

 

        2020-10-09 2020-10-09 Letter          Jody Garcia  [id number]

[id number] 



        00:00:00 00:00:00 (Out)           Josh Alvarez   350.1.13.10         



                                                [facility] [id number]         



                                                        [phone]         



                                                        091             

 

        2020-10-09 2020-10-09 Orders          Doctor  ИВАН    [id number]

34 



        00:00:00 00:00:00 Only            Unassigned, FANG   350.1.13.10       

  



                                        [facility] [id number]         



                                                        [phone]         



                                                        009             

 

        2020-10-06 2020-10-06 Telephone         JACLYN Garcia 1.2.840.11

[id number] 



        00:00:00 00:00:00                 [facility]orkShare 350.1.13.10         



                                                [facility] [id number]         



                                                        [phone]         



                                                        096             

 

        2020-10-02 2020-10-02 Transition         Hyacinth Diggskarlee  1.2.840.114 [id number]

[id number] 



        00:00:00 00:00:00 of Care         Didier Kauffman   350.1.13.10         



                                                [place]   [id number]         



                                                        [phone]         



                                                        403             

 

        2020-10-02 2020-10-02 Telephone         JACLYN Garcia 1.2.840.11

4 [id number] 



        00:00:00 00:00:00                 [facility] 350.1.13.10         



                                                [facility] [id number]         



                                                        [phone]         



                                                        096             







Results

This patient has no known results.

## 2021-01-18 NOTE — P.PN
Date of Service: 01/15/21 (Oncology)





Reason for consultation: Metastatic cervical cancer 





LATE NOTE ENTRY due to remote [employer] log in issues. Case was discussed with Dr Mcclelland when patient was seen on 1/15/2021





Apparently diagnosed in 2013 and was following at [facility]. She reports that  ?? no

treatment was offered. Nasir opted for spirutual care and has done well so far. 


Patient seems to have locally advanced metastatic cervical cancer. She seems 

quite frail with borderline performance status (ECOG 3). 





Based on the imaging, she has extensive locally advanced disease with 

significant local compression causing hydronephrosis and will need nephrostomy 

tubes. 





We had an extensive discussion that the cancer is not curable and treatment is 

mainly aimed with a palliative intent to prolong survival and palliate symptoms.




After a complete work up, possible treatment options include palliative 

chemotherapy vs chemotherapy + radiation. Whether surgery is an option at this 

point is also questionable but she can have evaluation with a Gyn Onc at [facility]. 


Given her borderline performance status, her tolerance to chemotherapy is quite 

debatable. She enquires about opting for spiritual care. In that case, I 

recommended  hospice care might be the ideal option. She is unable to make a 

decision but will let me know as soon as possible. She has been a pt of [facility] in

the past and might want to consider follow up with them for treatment options 

and evaluation. I have given her my contact details as  well should she wish to 

pursue palliative treatment locally in [place]. 


Follow up with Urology for nephrostomy tube placement.

## 2021-01-19 LAB
ALBUMIN SERPL ELPH-MCNC: 2.4 G/DL (ref 3.8–4.8)
PROT PATTERN SERPL ELPH-IMP: (no result)

## 2021-02-03 ENCOUNTER — HOSPITAL ENCOUNTER (EMERGENCY)
Dept: HOSPITAL 97 - ER | Age: 76
End: 2021-02-03
Payer: COMMERCIAL

## 2021-02-03 VITALS — DIASTOLIC BLOOD PRESSURE: 32 MMHG | SYSTOLIC BLOOD PRESSURE: 102 MMHG

## 2021-02-03 VITALS — TEMPERATURE: 98.1 F

## 2021-02-03 DIAGNOSIS — Z79.01: ICD-10-CM

## 2021-02-03 DIAGNOSIS — E11.22: ICD-10-CM

## 2021-02-03 DIAGNOSIS — J15.8: ICD-10-CM

## 2021-02-03 DIAGNOSIS — N18.9: ICD-10-CM

## 2021-02-03 DIAGNOSIS — R09.2: Primary | ICD-10-CM

## 2021-02-03 DIAGNOSIS — Z20.822: ICD-10-CM

## 2021-02-03 DIAGNOSIS — Z79.82: ICD-10-CM

## 2021-02-03 DIAGNOSIS — A41.9: ICD-10-CM

## 2021-02-03 DIAGNOSIS — N17.9: ICD-10-CM

## 2021-02-03 DIAGNOSIS — Z91.048: ICD-10-CM

## 2021-02-03 DIAGNOSIS — I50.9: ICD-10-CM

## 2021-02-03 DIAGNOSIS — I13.0: ICD-10-CM

## 2021-02-03 DIAGNOSIS — D72.825: ICD-10-CM

## 2021-02-03 DIAGNOSIS — Z95.1: ICD-10-CM

## 2021-02-03 DIAGNOSIS — Z88.2: ICD-10-CM

## 2021-02-03 LAB
ALBUMIN SERPL BCP-MCNC: 2.3 G/DL (ref 3.4–5)
ALP SERPL-CCNC: 66 U/L (ref 45–117)
ALT SERPL W P-5'-P-CCNC: 8 U/L (ref 12–78)
AST SERPL W P-5'-P-CCNC: 22 U/L (ref 15–37)
BUN BLD-MCNC: 47 MG/DL (ref 7–18)
GLUCOSE SERPLBLD-MCNC: 118 MG/DL (ref 74–106)
HCT VFR BLD CALC: 30.6 % (ref 36–45)
INR BLD: 1.01
LYMPHOCYTES # SPEC AUTO: 0.7 K/UL (ref 0.7–4.9)
MAGNESIUM SERPL-MCNC: 2 MG/DL (ref 1.8–2.4)
MORPHOLOGY BLD-IMP: (no result)
PMV BLD: 8.4 FL (ref 7.6–11.3)
POTASSIUM SERPL-SCNC: 3.6 MMOL/L (ref 3.5–5.1)
RBC # BLD: 3.45 M/UL (ref 3.86–4.86)
TROPONIN (EMERG DEPT USE ONLY): 0.15 NG/ML (ref 0–0.04)

## 2021-02-03 PROCEDURE — 85025 COMPLETE CBC W/AUTO DIFF WBC: CPT

## 2021-02-03 PROCEDURE — 83880 ASSAY OF NATRIURETIC PEPTIDE: CPT

## 2021-02-03 PROCEDURE — 83735 ASSAY OF MAGNESIUM: CPT

## 2021-02-03 PROCEDURE — 87040 BLOOD CULTURE FOR BACTERIA: CPT

## 2021-02-03 PROCEDURE — 71045 X-RAY EXAM CHEST 1 VIEW: CPT

## 2021-02-03 PROCEDURE — 80048 BASIC METABOLIC PNL TOTAL CA: CPT

## 2021-02-03 PROCEDURE — 80076 HEPATIC FUNCTION PANEL: CPT

## 2021-02-03 PROCEDURE — 99285 EMERGENCY DEPT VISIT HI MDM: CPT

## 2021-02-03 PROCEDURE — 36415 COLL VENOUS BLD VENIPUNCTURE: CPT

## 2021-02-03 PROCEDURE — 85610 PROTHROMBIN TIME: CPT

## 2021-02-03 PROCEDURE — 84484 ASSAY OF TROPONIN QUANT: CPT

## 2021-02-03 PROCEDURE — 93005 ELECTROCARDIOGRAM TRACING: CPT

## 2021-02-03 NOTE — ER
Nurse's Notes                                                                                     

 [facility]                                                                 

Name: Va Hughes                                                                              

Age: 75 yrs                                                                                       

Sex: Female                                                                                       

: 1945                                                                                   

MRN: [id number]                                                                                   

Arrival Date: 2021                                                                          

Time: 02:49                                                                                       

Account#: [id number]                                                                            

Bed 30                                                                                            

Private MD:                                                                                       

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            

  failure;Acute kidney failure-on chronic;Pneumonia due to other specified                        

  bacteria;Sepsis, unspecified organism-leukocytosis, bandemia;Respiratory arrest                 

                                                                                                  

Presentation:                                                                                     

                                                                                             

02:50 Chief complaint: EMS states: called out for shortness of breath, hx of anxiety, was     em  

      given 1 mg ativan PO PTA, 99% 2 L via NC, reports right shoulder pain, pt is on hospice     

      for metastatic CA. Coronavirus screen: Client denies travel out of the [place] in the last     

      14 days. Ebola Screen: Patient negative for fever greater than or equal to 101.5            

      degrees Fahrenheit, and additional compatible Ebola Virus Disease symptoms Patient          

      denies exposure to infectious person. Patient denies travel to an Ebola-affected area       

      in the 21 days before illness onset. No symptoms or risks identified at this time.          

      Initial Sepsis Screen: Does the patient meet any 2 criteria? No. Patient's initial          

      sepsis screen is negative. Does the patient have a suspected source of infection? No.       

      Patient's initial sepsis screen is negative. Risk Assessment: Do you want to hurt           

      yourself or someone else? Patient reports no desire to harm self or others. Onset of        

      symptoms was 2021.                                                             

02:50 Method Of Arrival: EMS: [facility]                                                em  

02:50 Acuity: EDMOND 3                                                                           em  

                                                                                                  

Triage Assessment:                                                                                

03:00 Respiratory: the patient has mild shortness of breath.                                  rr5 

03:00 General: Appears in no apparent distress. comfortable, Behavior is calm, cooperative.   rr5 

                                                                                                  

Historical:                                                                                       

- Allergies:                                                                                      

02:54 Iodine;                                                                                 em  

02:54 Sulfa (Sulfonamide Antibiotics);                                                        em  

- Home Meds:                                                                                      

02:54 aspirin 81 mg Oral TbEC 1 tab once daily [Active]; enoxaparin 40 mg/0.4 mL subcutaneous em  

      syrg every 12 hours [Active]; metoprolol tartrate 50 mg Oral tab 1 tab 2 times per day      

      [Active]; Plavix 75 mg Oral tab 1 tab once daily [Active]; ranolazine Oral 1 tab 2          

      times per day [Active];                                                                     

- PMHx:                                                                                           

02:54 Diabetes - NIDDM; DVT; Hypertension; Myocardial infarction;                             em  

- PSHx:                                                                                           

02:54 CABG;                                                                                   em  

                                                                                                  

- Immunization history:: Adult Immunizations up to date.                                          

- Social history:: Smoking status: Patient denies any tobacco usage or history of.                

                                                                                                  

                                                                                                  

Screenin:59 Abuse screen: Denies threats or abuse. Denies injuries from another. Nutritional        rr5 

      screening: No deficits noted. Tuberculosis screening: No symptoms or risk factors           

      identified. Fall Risk None identified. Total Lopez Fall Scale indicates No Risk (0-24       

      pts).                                                                                       

                                                                                                  

Assessment:                                                                                       

02:56 General: Appears in no apparent distress. comfortable, Behavior is calm, cooperative,   rr5 

      appropriate for age. Pain: Complains of pain in chest Quality of pain is described as       

      aching, Pain began gradually, Is intermittent. Neuro: Level of Consciousness is awake,      

      alert, obeys commands, Oriented to person, place, time, situation. Cardiovascular:          

      Capillary refill < 3 seconds Patient's skin is warm and dry. Rhythm is regular.             

      Respiratory: Reports shortness of breath Airway is patent Respiratory effort is even,       

      unlabored, Respiratory pattern is regular, symmetrical. GI: No signs and/or symptoms        

      were reported involving the gastrointestinal system. : No signs and/or symptoms were      

      reported regarding the genitourinary system. EENT: No signs and/or symptoms were            

      reported regarding the EENT system. Derm: Skin is intact, is healthy with good turgor,      

      Skin temperature is warm. Musculoskeletal: Capillary refill < 3 seconds.                    

04:00 Reassessment: Patient appears in no apparent distress at this time. Patient is alert,   rr5 

      oriented x 3, equal unlabored respirations, skin warm/dry/pink. awaiting for results.       

05:00 Reassessment: Patient appears in no apparent distress at this time. Patient is alert,   rr5 

      oriented x 3, equal unlabored respirations, skin warm/dry/pink. awaiting for review.        

05:45 Reassessment: complaints of chest pain, SOB, ED provider with order made and carried    rr5 

      out.                                                                                        

06:00 Reassessment: Patient appears in no apparent distress at this time. Patient is alert,   rr5 

      oriented x 3, equal unlabored respirations, skin warm/dry/pink. reassess by ED provider     

      advised for admission patient is undecided if to revoke to hospice care.                    

06:15 Reassessment: Pt O2 noted to be at 70%, respirations apneic, pt placed on non           ea  

      rebreather, provider notified, order obtained to give comfort measures.                     

06:44 Reassessment: Dr. Anderson called time of death 0640.                                   ea  

06:52 Reassessment: ECG strip printed HR 0 Bpm RR 0 cpm.                                      rr5 

07:06 Reassessment: [id number].                                                               ea  

07:33 Reassessment: [employer] at the bedside.                                                      sv  

11:21 Reassessment: Pt taken to [facility].                                        sv  

                                                                                                  

Vital Signs:                                                                                      

02:50  / 80; Pulse 87; Resp 18; Temp 98.1; Pulse Ox 96% on 3 lpm NC; Weight 45.36 kg;   em  

      Height 5 ft. 0 in. (152.40 cm); Pain 4/10;                                                  

04:00  / 84; Pulse 87; Resp 19; Pulse Ox 95% on 2 lpm NC;                               rr5 

05:00  / 74; Pulse 80; Resp 24; Pulse Ox 95% on 3 lpm NC;                               rr5 

06:00  / 85; Pulse 89; Resp 23; Pulse Ox 95% on 3 lpm NC;                               rr5 

06:15  / 32; Pulse 50; Resp 10; Pulse Ox 70% ;                                          ea  

06:40 Pulse 0; Resp 0; Pulse Ox 0% ;                                                          ea  

02:50 Body Mass Index 19.53 (45.36 kg, 152.40 cm)                                             em  

                                                                                                  

ED Course:                                                                                        

02:49 Patient arrived in ED.                                                                  cl3 

02:53 Triage completed.                                                                       em  

02:54 Arm band placed on.                                                                     em  

02:56 Cruz Vicente, RN is Primary Nurse.                                                    rr5 

02:59 Patient has correct armband on for positive identification. Placed in gown. Bed in low  rr5 

      position. Call light in reach. Side rails up X2. Cardiac monitor on. Pulse ox on. NIBP      

      on.                                                                                         

03:15 Quan Crooks MD is Attending Physician.                                             yuniel 

03:30 Inserted saline lock: 24 gauge in right forearm, using aseptic technique. ,using        rr5 

      aseptic technique. inserted by [facility] tech Blood collected.                             

03:40 EKG done, by ED staff, reviewed by Quan Crooks MD.                                   rr5 

03:59 COVID swab sent to lab.                                                                 rr5 

04:53 XRAY Chest (1 view) In Process Unspecified.                                             EDMS

05:53 Bill Montano DO is Hospitalizing Provider.                                           yuniel 

06:24 No provider procedures requiring assistance completed.                                  rr5 

06:49 Quan Crooks MD is Pronouncing Provider.                                            yuniel 

                                                                                                  

Administered Medications:                                                                         

      Discontinued: NS 0.9% 1000 ml IV at 125 ml/hr continuous                                    

04:18 Drug: NS 0.9% 1000 ml Route: IV; Rate: 125 ml/hr; Site: right forearm;                  rr5 

06:17 Follow up: Response: No adverse reaction; IV Status: Order to discontinue infusion; IV  rr5 

      Intake: 125ml                                                                               

06:17 Follow up: Response: No adverse reaction                                                rr5 

06:00 Drug: Ativan 0.5 mg Route: IVP; Site: left forearm;                                     sf  

06:10 Drug: Zofran (Ondansetron) 4 mg Route: IVP; Site: left hand;                            rr5 

06:12 Drug: morphine 2 mg {Note: rass 0.} Route: IVP; Site: left hand;                        rr5 

06:14 Drug: Lasix 40 mg Route: IVP; Site: left hand;                                          rr5 

06:16 Drug: 3.375 grams of (Zosyn 3.375 grams, NS 0.9% 100 ml) Route: IVPB; Infused Over: 60  rr5 

      mins; Site: left hand;                                                                      

06:40 Follow up: Response: Other; IV Status: Order to discontinue infusion;            rr5 

                                                                                                  

                                                                                                  

Intake:                                                                                           

06:17 IV: 125ml; Total: 125ml.                                                                rr5 

                                                                                                  

Outcome:                                                                                          

05:56 Decision to Hospitalize by Provider.                                                    yuniel 

07:00 Patient :  Time of death 06:40 Pronounced by Quan Crooks MD                   rr5 

07:00  Condition:                                                                          

07:00 Instructed on                                                                               

11:21 Patient left the ED.                                                                    sv  

                                                                                                  

Signatures:                                                                                       

Dispatcher [employer]                           EDMS                                                 

Lynn Condon RN RN sv Anderson, Corey, MD MD cha Munoz, Edgar, RN                        Annalise Dotson RN RN ea Roque, Raymond, RN RN   rr5                                                  

Cody Garcia                                cl3                                                  

Gumaro Pham RN RN   sf                                                   

                                                                                                  

Corrections: (The following items were deleted from the chart)                                    

06:23 05:00 Reassessment: Patient appears in no apparent distress at this time. Patient is    rr5 

      alert, oriented x 3, equal unlabored respirations, skin warm/dry/pink. rr5                  

06:41 06:00 Reassessment: Pt sats decreased to 70%, placed on non rebreather, pt respirations sf  

      apneic, provider notified, order to give comfort measures. sf                               

06:42 06:15 Reassessment: Pt sats decreased to 70%, placed on non rebreather, pt respirations sf  

      apneic, provider notified, order to give comfort measures. sf                               

                                                                                                  

**************************************************************************************************

## 2021-02-03 NOTE — EDPHYS
Physician Documentation                                                                           

 [facility]                                                                 

Name: Va Hughes                                                                              

Age: 75 yrs                                                                                       

Sex: Female                                                                                       

: 1945                                                                                   

MRN: [id number]                                                                                   

Arrival Date: 2021                                                                          

Time: 02:49                                                                                       

Account#: [id number]                                                                            

Bed 30                                                                                            

Private MD:                                                                                       

ED Physician Quan Crooks                                                                      

HPI:                                                                                              

                                                                                             

05:47 This 75 yrs old  Female presents to ER via EMS with complaints of Shortness Of yuniel 

      Breath.                                                                                     

05:47 The patient has shortness of breath at rest, with light activity. Onset: The            yuniel 

      symptoms/episode began/occurred 2 day(s) ago. Duration: The symptoms are continuous,        

      and are steadily getting worse. The patient's shortness of breath is aggravated by          

      nothing, is alleviated by nothing. Associated signs and symptoms: Pertinent positives:      

      chest pain, non-productive cough. Severity of symptoms: At their worst the symptoms         

      were moderate in the emergency department the symptoms have improved mildly. The            

      patient has experienced similar episodes in the past, several times.                        

                                                                                                  

Historical:                                                                                       

- Allergies:                                                                                      

02:54 Iodine;                                                                                 em  

02:54 Sulfa (Sulfonamide Antibiotics);                                                        em  

- Home Meds:                                                                                      

02:54 aspirin 81 mg Oral TbEC 1 tab once daily [Active]; enoxaparin 40 mg/0.4 mL subcutaneous em  

      syrg every 12 hours [Active]; metoprolol tartrate 50 mg Oral tab 1 tab 2 times per day      

      [Active]; Plavix 75 mg Oral tab 1 tab once daily [Active]; ranolazine Oral 1 tab 2          

      times per day [Active];                                                                     

- PMHx:                                                                                           

02:54 Diabetes - NIDDM; DVT; Hypertension; Myocardial infarction;                             em  

- PSHx:                                                                                           

02:54 CABG;                                                                                   em  

                                                                                                  

- Immunization history:: Adult Immunizations up to date.                                          

- Social history:: Smoking status: Patient denies any tobacco usage or history of.                

                                                                                                  

                                                                                                  

ROS:                                                                                              

05:49 Constitutional: Negative for fever, chills, and weight loss, Eyes: Negative for injury, yuniel 

      pain, redness, and discharge, ENT: Negative for injury, pain, and discharge, Neck:          

      Negative for injury, pain, and swelling, Abdomen/GI: Negative for abdominal pain,           

      nausea, vomiting, diarrhea, and constipation, Back: Negative for injury and pain, :       

      Negative for injury, bleeding, discharge, and swelling, MS/Extremity: Negative for          

      injury and deformity, Neuro: Negative for headache, weakness, numbness, tingling, and       

      seizure, Psych: Negative for depression, anxiety, suicide ideation, homicidal ideation,     

      and hallucinations, Allergy/Immunology: Negative for hives, rash, and allergies,            

      Endocrine: Negative for neck swelling, polydipsia, polyuria, polyphagia, and marked         

      weight changes, Hematologic/Lymphatic: Negative for swollen nodes, abnormal bleeding,       

      and unusual bruising.                                                                       

05:49 Cardiovascular: Positive for chest pain, orthopnea, palpitations.                           

05:49 Respiratory: Positive for cough, shortness of breath, at rest.                              

05:49 Skin: Positive for pallor.                                                                  

05:49 Neuro: Positive for weakness.                                                               

                                                                                                  

Exam:                                                                                             

05:49 Constitutional:  This is a well developed, well nourished patient who is awake, alert,  yuniel 

      and in no acute distress. Head/Face:  Normocephalic, atraumatic. Eyes:  Pupils equal        

      round and reactive to light, extra-ocular motions intact.  Lids and lashes normal.          

      Conjunctiva and sclera are non-icteric and not injected.  Cornea within normal limits.      

      Periorbital areas with no swelling, redness, or edema. ENT:  Nares patent. No nasal         

      discharge, no septal abnormalities noted.  Tympanic membranes are normal and external       

      auditory canals are clear.  Oropharynx with no redness, swelling, or masses, exudates,      

      or evidence of obstruction, uvula midline.  Mucous membranes moist. Neck:  Trachea          

      midline, no thyromegaly or masses palpated, and no cervical lymphadenopathy.  Supple,       

      full range of motion without nuchal rigidity, or vertebral point tenderness.  No            

      Meningismus. Chest/axilla:  Normal chest wall appearance and motion.  Nontender with no     

      deformity.  No lesions are appreciated. Abdomen/GI:  Soft, non-tender, with normal          

      bowel sounds.  No distension or tympany.  No guarding or rebound.  No evidence of           

      tenderness throughout. Back:  No spinal tenderness.  No costovertebral tenderness.          

      Full range of motion. Female :  Normal external genitalia. MS/ Extremity:  Pulses         

      equal, no cyanosis.  Neurovascular intact.  Full, normal range of motion. Neuro:  Awake     

      and alert, GCS 15, oriented to person, place, time, and situation.  Cranial nerves          

      II-XII grossly intact.  Motor strength 5/5 in all extremities.  Sensory grossly intact.     

       Cerebellar exam normal.  Normal gait. Psych:  Awake, alert, with orientation to            

      person, place and time.  Behavior, mood, and affect are within normal limits.               

05:49 Cardiovascular: Rate: normal, Rhythm: regular, Pulses: Pulses are 3+ in bilateral           

      radial, brachial, femoral, popliteal, posterior tibial and and dorsalis pedis               

      arteries.. Edema: 1+ edema to level of left midcalf and right midcalf, JVD: is noted        

      bilaterally, to 2 cm.                                                                       

05:49 ECG was reviewed by the Attending Physician.                                                

                                                                                                  

Vital Signs:                                                                                      

02:50  / 80; Pulse 87; Resp 18; Temp 98.1; Pulse Ox 96% on 3 lpm NC; Weight 45.36 kg;   em  

      Height 5 ft. 0 in. (152.40 cm); Pain 4/10;                                                  

04:00  / 84; Pulse 87; Resp 19; Pulse Ox 95% on 2 lpm NC;                               rr5 

05:00  / 74; Pulse 80; Resp 24; Pulse Ox 95% on 3 lpm NC;                               rr5 

06:00  / 85; Pulse 89; Resp 23; Pulse Ox 95% on 3 lpm NC;                               rr5 

06:15  / 32; Pulse 50; Resp 10; Pulse Ox 70% ;                                          ea  

06:40 Pulse 0; Resp 0; Pulse Ox 0% ;                                                          ea  

02:50 Body Mass Index 19.53 (45.36 kg, 152.40 cm)                                             em  

                                                                                                  

MDM:                                                                                              

03:15 Patient medically screened.                                                             yuniel 

05:59 Differential diagnosis: Anemia CHF exacerbation, abnormal EKG, acute pericarditis,      yuniel 

      congestive heart failure costochondritis, Myocardial Infarction Pulmonary Embolism          

      Unstable Angina. Antibiotic administration: zosyn . HEART Score: History: Moderately        

      Suspicious (1), ECG: Non specific repolarization disturbance / LBTB / PM (1), Age: > or     

      = 65 years (2), Risk Factors: > or = 3 Risk factors for atherosclerotic disease (2),        

      [Hypercholesterolemia] [Hypertension] [+ Family HX] Troponin: < or = 1 x Normal Limit       

      (0). The patient was not given aspirin in the Emergency Department. The patient's Wells     

      Deep Vein Thrombosis Score was calculated as follows: Total Score: 0. This patient was      

      found to be at low risk for a deep vein thrombosis by using the Well's assessment           

      criteria Previous DVT/PE (1.5 Pts) Total Score:. The patient's pulmonary embolism risk      

      score was calculated as follows: the patient has a history of a previous deep vein          

      thrombosis or pulmonary embolism (1.5 Pts). WILLIAM Risk Score: 1 - patient's age is           

      greater or equal to 65 years, 1 - Three or more CAD risk factors, 1- Known CAD, 1 - ASA     

      use in past 7 days, 1 - Elevated Cardiac Markers, TOTAL SCORE = 5. Immunization status:     

      Pneumococcal vaccine: Influenza vaccine: Data reviewed: vital signs, nurses notes, lab      

      test result(s), EKG, radiologic studies, plain films.                                       

                                                                                                  

                                                                                             

03:00 Order name: Basic Metabolic Panel                                                       5 

                                                                                             

03:00 Order name: CBC with Diff; Complete Time: 05:39                                         rr5 

                                                                                             

03:00 Order name: LFT's                                                                       rr 

                                                                                             

03:00 Order name: Magnesium; Complete Time: 05:39                                             rr5 

                                                                                             

03:00 Order name: NT PRO-BNP; Complete Time: 05:39                                            rr5 

                                                                                             

03:00 Order name: PT-INR; Complete Time: 05:39                                                rr5 

                                                                                             

03:00 Order name: Troponin (emerg Dept Use Only); Complete Time: 05:39                        rr5 

                                                                                             

03:03 Order name: Basic Metabolic Panel; Complete Time: 05:39                                 EDMS

                                                                                             

03:03 Order name: Liver (Hepatic) Function; Complete Time: 05:39                              EDMS

                                                                                             

04:22 Order name: Manual Differential; Complete Time: 05:39                                   EDMS

                                                                                             

05:43 Order name: Blood Culture Adult (2)                                                     yuniel 

                                                                                             

05:55 Order name: SARS-COV-2 RT PCR                                                           EDMS

                                                                                             

03:00 Order name: XRAY Chest (1 view)                                                         [facility] 

                                                                                             

03:00 Order name: EKG; Complete Time: 03:03                                                   rr5 

                                                                                             

03:00 Order name: Cardiac monitoring; Complete Time: 03:54                                    rr5 

                                                                                             

03:00 Order name: EKG - Nurse/Tech; Complete Time: 03:54                                      5 

                                                                                             

03:00 Order name: IV Saline Lock; Complete Time: 03:54                                        rr5 

                                                                                             

03:00 Order name: Labs collected and sent; Complete Time: 03:54                               5 

                                                                                             

03:00 Order name: O2 Per Protocol; Complete Time: 03:54                                       5 

                                                                                             

03:00 Order name: O2 Sat Monitoring; Complete Time: 03:54                                     rr5 

                                                                                                  

EC:49 Rate is 87 beats/min. Rhythm is regular. QRS Axis is Normal. HI interval is normal. QRS yuniel 

      interval is normal. QT interval is prolonged at 490 msec. No Q waves. T waves are           

      Normal. No ST changes noted. Clinical impression: NSR w/ Non-specific ST/T Changes and      

      No evidence of ischemia. Interpreted by me. Reviewed by me.                                 

                                                                                                  

Administered Medications:                                                                         

      Discontinued: NS 0.9% 1000 ml IV at 125 ml/hr continuous                                    

04:18 Drug: NS 0.9% 1000 ml Route: IV; Rate: 125 ml/hr; Site: right forearm;                  rr5 

06:17 Follow up: Response: No adverse reaction; IV Status: Order to discontinue infusion; IV  rr5 

      Intake: 125ml                                                                               

06:17 Follow up: Response: No adverse reaction                                                rr5 

06:00 Drug: Ativan 0.5 mg Route: IVP; Site: left forearm;                                     sf  

06:10 Drug: Zofran (Ondansetron) 4 mg Route: IVP; Site: left hand;                            rr5 

06:12 Drug: morphine 2 mg {Note: rass 0.} Route: IVP; Site: left hand;                        rr5 

06:14 Drug: Lasix 40 mg Route: IVP; Site: left hand;                                          rr5 

06:16 Drug: 3.375 grams of (Zosyn 3.375 grams, NS 0.9% 100 ml) Route: IVPB; Infused Over: 60  rr5 

      mins; Site: left hand;                                                                      

06:40 Follow up: Response: Other; IV Status: Order to discontinue infusion;            rr5 

                                                                                                  

                                                                                                  

Disposition:                                                                                      

Patient pronounced on 21 06:40 by Quan Crooks. Impression: Unspecified                  

  combined systolic (congestive) and diastolic (congestive) heart failure, Acute kidney           

  failure - on chronic, Pneumonia due to other specified bacteria, Sepsis, unspecified            

  organism - leukocytosis, bandemia, Respiratory arrest.                                          

- Released to  Home.                                                                       

                                                                                                  

                                                                                                  

                                                                                                  

Signatures:                                                                                       

Dispatcher [employer]                           Lynn Ruiz RN                    GRACY                                                      

Quan Crooks MD MD cha Munoz, Edgar, RN RN em Roque, Raymond, RN RN   rr5                                                  

Gumaro Pham RN RN   sf                                                   

                                                                                                  

Corrections: (The following items were deleted from the chart)                                    

04:37 03:43 CORONAVIRUS+LAB.GERARD ordered. EDMS                                              EDMS

06:26 05:55 Hospitalization Ordered by Bill Montano DO for Inpatient Admission. Preliminary  yuniel 

      diagnosis is Chest pain, unspecified; Unspecified combined systolic (congestive) and        

      diastolic (congestive) heart failure; Elevated white blood cell count; Bandemia;            

      Cardiomegaly; Pneumonia due to other specified bacteria - bilateral; Acute kidney           

      failure - on chronic. Bed requested for Telemetry/MedSurg (Inpatient). Status is            

      Inpatient Admission. Condition is Serious. Problem is new. Symptoms have improved. [facility]      

11:21 06:52 2021 06:52 Patient pronounced on 2021 at 06:40 by Quan Crooks.    sv  

      Impression: Unspecified combined systolic (congestive) and diastolic (congestive) heart     

      failure; Acute kidney failure - on chronic; Pneumonia due to other specified bacteria;      

      Sepsis, unspecified organism - leukocytosis, bandemia; Respiratory arrest. Released to      

       Home. yuniel                                                                           

                                                                                                  

**************************************************************************************************

## 2021-02-03 NOTE — XMS REPORT
Continuity of Care Document

                           Created on:February 3, 2021



Patient:FANTA MCCABE

Sex:Female

:1945

External Reference #:[id number]





Demographics







                          Address                   [address] [address]

 

                          Home Phone                [phone]

 

                          Work Phone                [phone]

 

                          Mobile Phone              [phone]

 

                          Email Address             [address]

 

                          Preferred Language        English

 

                          Marital Status            Unknown

 

                          Zoroastrianism Affiliation     Unknown

 

                          Race                      Unknown

 

                          Additional Race(s)        Unavailable



                                                    Unavailable



                                                    White

 

                          Ethnic Group              Unknown









Author







                          Organization              [facility]

t

 

                          Address                   [address]



                                                    [place] [address]

 

                          Phone                     [phone]









Support







                Name            Relationship    Address         Phone

 

                Thanh (DAUGHTER) ESTELLE Child           [phone]





                                                [place] [address] 

 

                Liuz          Mother          Unavailable     [phone]

 

                Martinez (Brother)   Sibling         Unavailable     [phone]









Care Team Providers







                    Name                Role                Phone

 

                    CHRISTINA ALMAGUER         Primary Care Physician Unavailable

 

                    SYSTEM,  NOT IN     Attending Clinician Unavailable

 

                    Doctor Unassigned,  Name Attending Clinician Unavailable

 

                    Jose BAH       Attending Clinician [phone]

 

                    Dontae RN,  A       Attending Clinician Unavailable









Problems

This patient has no known problems.



Allergies, Adverse Reactions, Alerts

This patient has no known allergies or adverse reactions.



Medications

This patient has no known medications.



Procedures

This patient has no known procedures.



Encounters







        Start   End     Encounter Admission Attending Care    Care    Encounter 

Source



        Date/Time Date/Time Type    Type    Clinicians Facility Department ID   

   

 

        2020-10-07         Outpatient         SYSTEM, [facility]     [id number]

 MD



        10:40:33                         PROVIDER                         Carlos desir

 

        2020-10-23 2020-10-23 Orders          Doctor OATES    [id number]

26 



        00:00:00 00:00:00 Only            UnassFANG neville   350.1.13.10       

  



                                        [facility] [id number]         



                                                        [phone]         



                                                        009             

 

        2020-10-09 2020-10-09 Letter          Jody Garcia  [id number]

[id number] 



        00:00:00 00:00:00 (Out)           Josh Alvarez   350.1.13.10         



                                                [facility] [id number]         



                                                        [phone]         



                                                        091             

 

        2020-10-09 2020-10-09 Orders          Doctor  ИВАН    [id number]

34 



        00:00:00 00:00:00 Only            Unassigned, FANG   350.1.13.10       

  



                                        [facility] [id number]         



                                                        [phone]         



                                                        009             

 

        2020-10-06 2020-10-06 Telephone         JACLYN Garcia 1.2.840.11

[id number] 



        00:00:00 00:00:00                 [facility] [employer]up 350.1.13.10         



                                                [facility] [id number]         



                                                        [phone]         



                                                        096             

 

        2020-10-02 2020-10-02 Transition         Hyacinth Diggskarlee  1.2.840.114 [id number]

[id number] 



        00:00:00 00:00:00 of Care         Didier Kauffman   350.1.13.10         



                                                Bryan   [id number]         



                                                        [phone]         



                                                        403             

 

        2020-10-02 2020-10-02 Telephone         JACLYN Garcia 1.2.840.11

4 [id number] 



        00:00:00 00:00:00                 [employer] 350.1.13.10         



                                                [facility] [id number]         



                                                        [phone]         



                                                        096             







Results

This patient has no known results.

## 2021-02-03 NOTE — RAD REPORT
EXAM DESCRIPTION:    Chest Single View

 

CLINICAL HISTORY:  CHEST PAIN

 

COMPARISON:  None.

 

FINDINGS:  Single frontal view of the chest.Tubes and lines: Leads overlie the chest.Cardiomediastina
l silhouette: Cardiomegaly. Prior median sternotomy.Lungs: Pulmonary vascular congestion bilateral in
terstitial opacities. No pneumothorax or large effusion.Bones: Degenerative change of the spine and s
houlders.Upper abdomen: No acute findings.

 

IMPRESSION:  1. Cardiomegaly with likely pulmonary edema pattern. Alternatively, bilateral pneumonic 
process could also produce this appearance.

 

Electronically signed by:   Jimmie Sullivan   2/3/2021 6:10 AM CST Workstation: [id number]

 

 

Due to temporary technical issues with the PACS/Fluency reporting system, reports are being signed by
 the in house radiologist without review as a courtesy to ensure prompt reporting. The interpreting r
adiologist is fully responsible for the content of the report.

## 2021-02-03 NOTE — EKG
Test Date:    2021-02-03               Test Time:    03:48:50

Technician:   RR                                     

                                                     

MEASUREMENT RESULTS:                                       

Intervals:                                           

Rate:         87                                     

MT:           164                                    

QRSD:         80                                     

QT:           408                                    

QTc:          490                                    

Axis:                                                

P:            40                                     

MT:           164                                    

QRS:          28                                     

T:            110                                    

                                                     

INTERPRETIVE STATEMENTS:                                       

                                                     

Normal sinus rhythm

Nonspecific ST and T wave abnormality

Prolonged QT

Abnormal ECG

Compared to ECG 01/17/2021 10:27:33

Prolonged QT interval now present

Left-axis deviation no longer present

Left ventricular hypertrophy no longer present

ST (T wave) deviation still present



Electronically Signed On 02-03-21 12:15:09 CST by Eloy Wyman

## 2022-11-16 NOTE — P.PN
Subjective


Date of Service: 01/12/21


Chief Complaint: Right leg swelling


Patient reports heaviness and pain in the right leg and difficulty walking as a 

result of that.  She continues to refuse Eliquis.  She reports intermittent 

suprapubic pain.








Physical Examination





- Vital Signs


Temperature: 98.9 F


Blood Pressure: 138/73


Pulse: 56


Respirations: 17


Pulse Ox (%): 96





- Physical Exam


General: Alert, In no apparent distress, Oriented x3


HEENT: Mucous membr. moist/pink


Neck: Supple


Respiratory: Clear to auscultation bilaterally, Normal air movement


Cardiovascular: Regular rate/rhythm, Normal S1 S2, Edema (Right lower extremity)


Gastrointestinal: Normal bowel sounds, Soft and benign


Musculoskeletal: Swelling (Right lower extremity), Tenderness


Integumentary: Erythema (Right medial thigh.)


Neurological: Other (No focal neurologic deficit.)





- Studies


Laboratory Data (last 24 hrs)





01/11/21 19:10: Magnesium 2.1


01/11/21 19:10: PT 12.9 H, INR 1.10


01/11/21 19:10: Sodium 146 H, Potassium 3.0 L, BUN 29 H, Creatinine 1.30, G

lucose 118 H, Total Bilirubin 0.4, AST 26, ALT 12, Alkaline Phosphatase 55


01/11/21 19:10: WBC 15.8 H, Hgb 11.1 L, Hct 34.6 L D, Plt Count 328  D








Assessment And Plan





- Current Problems (Diagnosis)


(1) DVT, recurrent, lower extremity, acute


Current Visit: Yes   Status: Acute   





(2) Bilateral hydronephrosis


Current Visit: Yes   Status: Acute   





(3) Cervical carcinoma


Current Visit: No   Status: Acute   





(4) Leukocytosis


Current Visit: No   Status: Acute   





(5) Staghorn calculus


Current Visit: No   Status: Acute   





(6) Coronary artery disease


Current Visit: Yes   Status: Acute   





- Plan


Will discontinue Eliquis and start full-dose Lovenox.


Pain medications as needed.


PT to evaluate functional status for discharge planning.


Empiric antibiotics


Obtain UA.


Stable renal function the spread moderate bilateral hydronephrosis.


Renal ultrasound results reviewed the ports large cervical mass invading the 

bladder and obstructing the ureters causing the hydronephrosis.


Will obtain Urology input regarding the hydronephrosis.


Recent cardiac stent.


Continue Aspirin, Plavix and Ranexa.


Constipation prophylaxis. None

## 2023-03-17 NOTE — RAD REPORT
EXAM DESCRIPTION:  CT - Stone Protocol - 12/9/2020 3:23 pm

 

CLINICAL HISTORY:  Flank pain.

ABD PAIN

 

COMPARISON:  Abdomen   Pelvis Wo Contrast dated 9/28/2020; Abdomen   Pelvis W Contrast dated 8/30/202
0

 

TECHNIQUE:  Axial images were obtained without oral or IV contrast. Lack of contrast limits solid org
an and vascular assessment. The field-of-view spans the entirety of the  system partially obscuring
 uppermost abdomen and lung bases. Coronal reformatted images were obtained and reviewed.

 

All CT scans are performed using dose optimization technique as appropriate and may include automated
 exposure control or mA/KV adjustment according to patient size.

 

FINDINGS:  The lower lung fields are clear.

 

Imaged portions of the liver and spleen show no suspicious findings on non-contrast imaging. 20 mm ri
ght adrenal mass is present with Hounsfield units of 36 HU, suggesting a non-adenomatous lesion.Verónica
l left adrenal gland. Normal pancreas. No pathologic lymphadenopathy in the abdomen or pelvis.

 

Very large right renal staghorn calculus. Moderate right ureter dilatation with numerous calcificatio
ns in the expected location of the right UVJ or urinary bladder.

 

Punctate left nephrolithiasis is present with moderate left hydronephrosis and hydroureter and severa
l calcifications in the expected location of the distal left ureter/urinary bladder. Air is present i
n the urinary bladder.

 

There is a large irregular necrotic cervical mass present likely carcinoma. 4 x 3 cm conglomerate of 
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
christina. No bowel obstruction, free fluid or abscess.Nonvisualized appendix.

 

No lytic or blastic bone lesion. Moderate lumbosacral degenerative changes.

 

IMPRESSION:  Large right-sided staghorn calculus is present. Several small stones are present in the 
distal suspected right ureter location urinary bladder region.

 

Punctate left nephrolithiasis with moderate left hydronephrosis and hydroureter with several calcific
ations also noted in the expected location of distal left ureter and dependent urinary bladder.

 

Large irregular necrotic cervical mass likely malignant. Soft tissue fullness along the right pelvic 
sidewall is noted measuring up to 4 x 3 cm, likely metastatic adenopathy. DISPLAY PLAN FREE TEXT